# Patient Record
Sex: MALE | Race: BLACK OR AFRICAN AMERICAN | NOT HISPANIC OR LATINO | URBAN - METROPOLITAN AREA
[De-identification: names, ages, dates, MRNs, and addresses within clinical notes are randomized per-mention and may not be internally consistent; named-entity substitution may affect disease eponyms.]

---

## 2023-04-13 ENCOUNTER — INPATIENT (INPATIENT)
Facility: HOSPITAL | Age: 68
LOS: 5 days | Discharge: HOME HEALTH | DRG: 545 | End: 2023-04-19
Attending: INTERNAL MEDICINE | Admitting: INTERNAL MEDICINE
Payer: MEDICARE

## 2023-04-13 ENCOUNTER — APPOINTMENT (OUTPATIENT)
Dept: BARIATRICS | Facility: CLINIC | Age: 68
End: 2023-04-13
Payer: MEDICARE

## 2023-04-13 VITALS
HEART RATE: 103 BPM | WEIGHT: 154.1 LBS | DIASTOLIC BLOOD PRESSURE: 77 MMHG | SYSTOLIC BLOOD PRESSURE: 116 MMHG | RESPIRATION RATE: 20 BRPM | HEIGHT: 74 IN | OXYGEN SATURATION: 95 % | TEMPERATURE: 99 F

## 2023-04-13 VITALS
SYSTOLIC BLOOD PRESSURE: 131 MMHG | TEMPERATURE: 97.9 F | OXYGEN SATURATION: 89 % | HEIGHT: 74 IN | HEART RATE: 117 BPM | DIASTOLIC BLOOD PRESSURE: 79 MMHG | WEIGHT: 152.13 LBS | BODY MASS INDEX: 19.52 KG/M2

## 2023-04-13 LAB
ALBUMIN SERPL ELPH-MCNC: 2.5 G/DL — LOW (ref 3.3–5)
ALBUMIN SERPL ELPH-MCNC: 2.5 G/DL — LOW (ref 3.3–5)
ALP SERPL-CCNC: 56 U/L — SIGNIFICANT CHANGE UP (ref 40–120)
ALP SERPL-CCNC: SIGNIFICANT CHANGE UP U/L (ref 40–120)
ALT FLD-CCNC: 102 U/L — HIGH (ref 10–45)
ALT FLD-CCNC: SIGNIFICANT CHANGE UP U/L (ref 10–45)
ANION GAP SERPL CALC-SCNC: 10 MMOL/L — SIGNIFICANT CHANGE UP (ref 5–17)
ANISOCYTOSIS BLD QL: SIGNIFICANT CHANGE UP
APPEARANCE UR: CLEAR — SIGNIFICANT CHANGE UP
APTT BLD: 29.2 SEC — SIGNIFICANT CHANGE UP (ref 27.5–35.5)
AST SERPL-CCNC: 241 U/L — HIGH (ref 10–40)
AST SERPL-CCNC: SIGNIFICANT CHANGE UP U/L (ref 10–40)
BACTERIA # UR AUTO: PRESENT /HPF
BASOPHILS # BLD AUTO: 0.11 K/UL — SIGNIFICANT CHANGE UP (ref 0–0.2)
BASOPHILS NFR BLD AUTO: 0.9 % — SIGNIFICANT CHANGE UP (ref 0–2)
BILIRUB DIRECT SERPL-MCNC: 0.2 MG/DL — SIGNIFICANT CHANGE UP (ref 0–0.3)
BILIRUB INDIRECT FLD-MCNC: 0.2 MG/DL — SIGNIFICANT CHANGE UP (ref 0.2–1)
BILIRUB SERPL-MCNC: 0.4 MG/DL — SIGNIFICANT CHANGE UP (ref 0.2–1.2)
BILIRUB SERPL-MCNC: 0.4 MG/DL — SIGNIFICANT CHANGE UP (ref 0.2–1.2)
BILIRUB UR-MCNC: NEGATIVE — SIGNIFICANT CHANGE UP
BUN SERPL-MCNC: 22 MG/DL — SIGNIFICANT CHANGE UP (ref 7–23)
CALCIUM SERPL-MCNC: 9.4 MG/DL — SIGNIFICANT CHANGE UP (ref 8.4–10.5)
CHLORIDE SERPL-SCNC: 97 MMOL/L — SIGNIFICANT CHANGE UP (ref 96–108)
CO2 SERPL-SCNC: 26 MMOL/L — SIGNIFICANT CHANGE UP (ref 22–31)
COLOR SPEC: YELLOW — SIGNIFICANT CHANGE UP
CREAT SERPL-MCNC: 1.36 MG/DL — HIGH (ref 0.5–1.3)
DIFF PNL FLD: ABNORMAL
EGFR: 57 ML/MIN/1.73M2 — LOW
EOSINOPHIL # BLD AUTO: 0 K/UL — SIGNIFICANT CHANGE UP (ref 0–0.5)
EOSINOPHIL NFR BLD AUTO: 0 % — SIGNIFICANT CHANGE UP (ref 0–6)
EPI CELLS # UR: SIGNIFICANT CHANGE UP /HPF (ref 0–5)
GIANT PLATELETS BLD QL SMEAR: PRESENT — SIGNIFICANT CHANGE UP
GLUCOSE SERPL-MCNC: 95 MG/DL — SIGNIFICANT CHANGE UP (ref 70–99)
GLUCOSE UR QL: NEGATIVE — SIGNIFICANT CHANGE UP
HCT VFR BLD CALC: 31.3 % — LOW (ref 39–50)
HGB BLD-MCNC: 10.4 G/DL — LOW (ref 13–17)
HYPOCHROMIA BLD QL: SLIGHT — SIGNIFICANT CHANGE UP
INR BLD: 1.13 — SIGNIFICANT CHANGE UP (ref 0.88–1.16)
KETONES UR-MCNC: NEGATIVE — SIGNIFICANT CHANGE UP
LEUKOCYTE ESTERASE UR-ACNC: NEGATIVE — SIGNIFICANT CHANGE UP
LYMPHOCYTES # BLD AUTO: 0.23 K/UL — LOW (ref 1–3.3)
LYMPHOCYTES # BLD AUTO: 1.9 % — LOW (ref 13–44)
MACROCYTES BLD QL: SLIGHT — SIGNIFICANT CHANGE UP
MAGNESIUM SERPL-MCNC: 2.1 MG/DL — SIGNIFICANT CHANGE UP (ref 1.6–2.6)
MANUAL SMEAR VERIFICATION: SIGNIFICANT CHANGE UP
MCHC RBC-ENTMCNC: 23.2 PG — LOW (ref 27–34)
MCHC RBC-ENTMCNC: 33.2 GM/DL — SIGNIFICANT CHANGE UP (ref 32–36)
MCV RBC AUTO: 69.9 FL — LOW (ref 80–100)
METAMYELOCYTES # FLD: 0.9 % — HIGH (ref 0–0)
MICROCYTES BLD QL: SIGNIFICANT CHANGE UP
MONOCYTES # BLD AUTO: 0.66 K/UL — SIGNIFICANT CHANGE UP (ref 0–0.9)
MONOCYTES NFR BLD AUTO: 5.5 % — SIGNIFICANT CHANGE UP (ref 2–14)
NEUTROPHILS # BLD AUTO: 10.89 K/UL — HIGH (ref 1.8–7.4)
NEUTROPHILS NFR BLD AUTO: 90.8 % — HIGH (ref 43–77)
NITRITE UR-MCNC: NEGATIVE — SIGNIFICANT CHANGE UP
NRBC # BLD: 2 /100 — HIGH (ref 0–0)
NRBC # BLD: SIGNIFICANT CHANGE UP /100 WBCS (ref 0–0)
PH UR: 6 — SIGNIFICANT CHANGE UP (ref 5–8)
PLAT MORPH BLD: ABNORMAL
PLATELET # BLD AUTO: 453 K/UL — HIGH (ref 150–400)
POIKILOCYTOSIS BLD QL AUTO: SIGNIFICANT CHANGE UP
POLYCHROMASIA BLD QL SMEAR: SLIGHT — SIGNIFICANT CHANGE UP
POTASSIUM SERPL-MCNC: 4.4 MMOL/L — SIGNIFICANT CHANGE UP (ref 3.5–5.3)
POTASSIUM SERPL-MCNC: SIGNIFICANT CHANGE UP MMOL/L (ref 3.5–5.3)
POTASSIUM SERPL-SCNC: 4.4 MMOL/L — SIGNIFICANT CHANGE UP (ref 3.5–5.3)
POTASSIUM SERPL-SCNC: SIGNIFICANT CHANGE UP MMOL/L (ref 3.5–5.3)
PROT SERPL-MCNC: 8.1 G/DL — SIGNIFICANT CHANGE UP (ref 6–8.3)
PROT SERPL-MCNC: 8.2 G/DL — SIGNIFICANT CHANGE UP (ref 6–8.3)
PROT UR-MCNC: 100 MG/DL
PROTHROM AB SERPL-ACNC: 13.5 SEC — HIGH (ref 10.5–13.4)
RBC # BLD: 4.48 M/UL — SIGNIFICANT CHANGE UP (ref 4.2–5.8)
RBC # FLD: 20.1 % — HIGH (ref 10.3–14.5)
RBC BLD AUTO: ABNORMAL
RBC CASTS # UR COMP ASSIST: < 5 /HPF — SIGNIFICANT CHANGE UP
SARS-COV-2 RNA SPEC QL NAA+PROBE: NEGATIVE — SIGNIFICANT CHANGE UP
SCHISTOCYTES BLD QL AUTO: SLIGHT — SIGNIFICANT CHANGE UP
SMUDGE CELLS # BLD: PRESENT — SIGNIFICANT CHANGE UP
SODIUM SERPL-SCNC: 133 MMOL/L — LOW (ref 135–145)
SP GR SPEC: 1.02 — SIGNIFICANT CHANGE UP (ref 1–1.03)
SPHEROCYTES BLD QL SMEAR: SLIGHT — SIGNIFICANT CHANGE UP
TARGETS BLD QL SMEAR: SIGNIFICANT CHANGE UP
TSH SERPL-MCNC: 12.64 UIU/ML — HIGH (ref 0.27–4.2)
UROBILINOGEN FLD QL: 0.2 E.U./DL — SIGNIFICANT CHANGE UP
WBC # BLD: 11.99 K/UL — HIGH (ref 3.8–10.5)
WBC # FLD AUTO: 11.99 K/UL — HIGH (ref 3.8–10.5)
WBC UR QL: < 5 /HPF — SIGNIFICANT CHANGE UP

## 2023-04-13 PROCEDURE — 71260 CT THORAX DX C+: CPT | Mod: 26,MA

## 2023-04-13 PROCEDURE — 70450 CT HEAD/BRAIN W/O DYE: CPT | Mod: 26,MA

## 2023-04-13 PROCEDURE — 83020 HEMOGLOBIN ELECTROPHORESIS: CPT | Mod: 26

## 2023-04-13 PROCEDURE — 99285 EMERGENCY DEPT VISIT HI MDM: CPT

## 2023-04-13 PROCEDURE — 99205 OFFICE O/P NEW HI 60 MIN: CPT

## 2023-04-13 PROCEDURE — 70491 CT SOFT TISSUE NECK W/DYE: CPT | Mod: 26,MA

## 2023-04-13 PROCEDURE — 74177 CT ABD & PELVIS W/CONTRAST: CPT | Mod: 26,MA

## 2023-04-13 RX ORDER — THIAMINE MONONITRATE (VIT B1) 100 MG
100 TABLET ORAL ONCE
Refills: 0 | Status: COMPLETED | OUTPATIENT
Start: 2023-04-13 | End: 2023-04-13

## 2023-04-13 RX ADMIN — Medication 100 MILLIGRAM(S): at 20:36

## 2023-04-13 NOTE — ASSESSMENT
[FreeTextEntry1] : Patient is a 68 year old M with no significant medical or surgical history who presents here today for the initial evaluation of dysphagia. On Metoprolol and amoxicillin. Referred by Dr. Chase Daly. Labs reviewed, revealed a normal liver, hyponitremia, transaminitis, anemia and hypoalbuminemia. CT shows multiple abrasion plasty and enlarged sub aortic node.  Patient complains of cheonic fatigue, LE edema due to standing excessively following which he started experiencing dysphagia and pain with meals, specially with solids. Reports one morning his face was swollen for which he visited the ED and was treated for 3 days with improvement. He was drinking soup during this event and lost around 10 lbs. Notices cough with phelgm. Colonoscopy from 11/25/22 was normal. Reports does not smoke and drink ETOH. He has a C-collar in place and reports having his head "drooping" down when he does not wear the C-collar. At this time, believe the best treatment plan for the patient will be sending him to the ER as the patient is severely anemic and has hypoalbuminemia. Have called the ambulance for transport.

## 2023-04-13 NOTE — ED PROVIDER NOTE - OBJECTIVE STATEMENT
67yo M previously healthy here w/ c/o generalized weakness, body pain, and unable to swallow x 6 months with a gradual 50lb weight loss. Also noted at times his legs will swell, as will his face. Had imaging 1 month ago with concern for aspiration, s/p abx but symptoms worsening. Saw GI and surgery outpatient but no procedures yet, was sent to Dr. Quiñones today but at appt recommended to come to the ED for inpatient workup.   pt denies any fever, chills, n/v, diarrhea/ constipation, any falls due to weakness. States had spine imaging that he did not bring of his thoracic spine that was totally normal, weakness attributed to dehydation/weight loss not spinal.  · Presenting Symptoms: WEAKNESS  · Negative Findings: no chills, no dizziness, no fever, no nausea, no pain, no tingling, no vomiting  · Timing: worsening  · Duration: day(s)  · Severity: MODERATE

## 2023-04-13 NOTE — CONSULT NOTE ADULT - ASSESSMENT
69yo male with no sig PMH PSH history presents with worsening weight loss, dysphagia, weakness and fatigue. Afebrile, HDS, exam significant for cachexia, abdomen soft, right testicular mass. Labs with WBC 12k, elevated TSH. CT demonstrating fluid level in esophagus, right testicular mass and concern for brain lesion. Differential includes metastatic neoplasm, unlikely esophageal, potentially testicular primary. Patient currently poorly nutritionally optimized.      - no acute surgical intervention  -  Recommend multidisciplinary evaluation  -  Please obtain GI, Neurology, Urology consultations  -  Recommend evaluation for potential medicine admission  - Surgery Team 2C will continue to follow. Please page Team 2 with questions/clinical changes. [phone]   67yo male with no sig PMH PSH history presents with worsening weight loss, dysphagia, weakness and fatigue. Afebrile, HDS, exam significant for cachexia, abdomen soft, right testicular mass. Labs with WBC 12k, elevated TSH. CT demonstrating fluid level in esophagus, right testicular mass and concern for brain lesion. Differential includes metastatic neoplasm, unlikely esophageal, potentially testicular primary. Patient currently poorly nutritionally optimized.      - no acute surgical intervention  -  Recommend multidisciplinary evaluation  -  Please obtain GI, Neurology, Urology consultations  -  Recommend evaluation for potential medicine admission  - Surgery Team 2C will continue to follow. Please page Team 2 with questions/clinical changes. [phone]    CHIEF RESIDENT ADDENDUM  Agree with above. Delayed note while in the OR; consult resident discussed with attending surgeon directly. 68M with no PMHx a/w 50 lb weight loss, dysphagia and weakness to the office, sent into the ER for further medical workup. Exam concerning for testicular mass. WBC 12. CT with testicular mass and possible brain lesion. 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 Recommend GI, neurology, and urology. Surgery will continue to follow while on medical service.

## 2023-04-13 NOTE — ED ADULT NURSE NOTE - OBJECTIVE STATEMENT
Pt presented to ed with the c/o generalized weakness, body pain, and unable to swallow. As per pt he went to check with his doctor, and for swallow study, he is unable to swallow and the doctor felt unsafe for him to go home so he was send in to hospital for further evaluation. Pt states he is having generalized body ache, and he is feeling very weak, as per pt wife at the bed side pt lost more than 10 pounds in the last 6 months.   On assessment pt is awake, alert and oriented, able to speak in full sentence, gurgly sound noted when pt is talking, pt respiration is regular and non labored.  pt denies any fever, chills, n/v, diarrhea/ constipation, any falls due to weakness, not in any acute distress at this time, will monitor safety maintained.

## 2023-04-13 NOTE — ED ADULT NURSE NOTE - NSIMPLEMENTINTERV_GEN_ALL_ED
Implemented All Fall Risk Interventions:  [place] to call system. Call bell, personal items and telephone within reach. Instruct patient to call for assistance. Room bathroom lighting operational. Non-slip footwear when patient is off stretcher. Physically safe environment: no spills, clutter or unnecessary equipment. Stretcher in lowest position, wheels locked, appropriate side rails in place. Provide visual cue, wrist band, yellow gown, etc. Monitor gait and stability. Monitor for mental status changes and reorient to person, place, and time. Review medications for side effects contributing to fall risk. Reinforce activity limits and safety measures with patient and family.

## 2023-04-13 NOTE — ED PROVIDER NOTE - PHYSICAL EXAMINATION
CONSTITUTIONAL: thin tired appearing older male  HEAD: Normocephalic; atraumatic.   EYES:  conjunctiva and sclera clear  ENT: normal nose; no rhinorrhea; normal pharynx with no erythema or lesions.   NECK: Supple; non-tender; no palpable thyroid  CARDIOVASCULAR: Normal S1, S2; no murmurs, rubs, or gallops. Regular rate and rhythm.   RESPIRATORY: Breathing easily; breath sounds clear and equal bilaterally; no wheezes, rhonchi, or rales.  GI: Soft; non-distended; non-tender; no palpable organomegaly.   EXT: No cyanosis or edema; N/V intact  SKIN: Normal for age and race; warm; dry; good turgor; no apparent lesions or rash.   NEURO: A & O x 3; face symmetric; grossly unremarkable.   PSYCHOLOGICAL: The patient’s mood and manner are appropriate.

## 2023-04-13 NOTE — HISTORY OF PRESENT ILLNESS
[de-identified] : Patient is a 68 year old M with no significant medical or surgical history who presents here today for the initial evaluation of dysphagia. On Metoprolol and amoxicillin. Referred by Dr. Chase Daly. Labs reviewed, revealed a normal liver, hyponitremia, transaminitis, anemia and hypoalbuminemia. CT shows multiple abrasion plasty and enlarged sub aortic node.  Patient complains of cheonic fatigue, LE edema due to standing excessively following which he started experiencing dysphagia and pain with meals, specially with solids. Reports one morning his face was swollen for which he visited the ED and was treated for 3 days with improvement. He was drinking soup during this event and lost around 10 lbs. Notices cough with phelgm. Colonoscopy from 11/25/22 was normal. Reports does not smoke and drink ETOH. He has a C-collar in place and reports having his head "drooping" down when he does not wear the C-collar. At this time, believe the best treatment plan for the patient will be sending him to the ER as the patient is severely anemic and has hypoalbuminemia. Will have labs drawn and will co-ordinate with the ED if needed for any further consultation. Have called the ambulance for transport.

## 2023-04-13 NOTE — END OF VISIT
[FreeTextEntry3] : All medical record entries made by the Scribe were at my, SHAINA Flores , direction and personally dictated by me on 04/13/2023 . I have reviewed the chart and agree that the record accurately reflects my personal performance of the history, physical exam, assessment and plan. I have also personally directed, reviewed, and agreed with the chart.\par  [Time Spent: ___ minutes] : I have spent [unfilled] minutes of time on the encounter.

## 2023-04-13 NOTE — ED PROVIDER NOTE - WET READ LAUNCH FT
There are no Wet Read(s) to document.
You can access the FollowMyHealth Patient Portal offered by [facility] by registering at the following website: [email or web address]. By joining [email or web address] FollowMyHealth portal, you will also be able to view your health information using other applications (apps) compatible with our system.

## 2023-04-13 NOTE — PHYSICAL EXAM
[No Rash or Lesion] : No rash or lesion [Alert] : alert [Oriented to Person] : oriented to person [Oriented to Place] : oriented to place [Oriented to Time] : oriented to time [Calm] : calm [de-identified] : not in any acute distress [de-identified] : ful range of motion

## 2023-04-13 NOTE — CONSULT NOTE ADULT - SUBJECTIVE AND OBJECTIVE BOX
Surgery Consult    Consulting Surgical Team:   [ ] Team 1 - Colorectal/Surgical Oncology ([phone])    [ x ] Team 2 - MIS/Bariatric Surgery ([phone])     [ ] Team 4 - Acute Care Surgery ([phone])     [ ] Team 5 - General Surgery/Breast/Pediatric Surgery ([phone])    Consulting Attending: Dr. Quiñones    HPI: 67yo M previously healthy here w/ c/o generalized weakness, body pain, and unable to swallow x 6 months with a gradual 50lb weight loss. Also noted at times his legs will swell, as will his face. Had imaging 1 month ago with concern for aspiration, s/p abx but symptoms worsening. Saw GI and surgery outpatient but no procedures yet, was sent to Dr. Quiñones today but at appt recommended to come to the ED for inpatient workup.   	pt denies any fever, chills, n/v, diarrhea/ constipation, any falls due to weakness. States had spine imaging that he did not bring of his thoracic spine that was totally normal, weakness attributed to dehydation/weight loss not spinal.  	· Presenting Symptoms: WEAKNESS  	· Negative Findings: no chills, no dizziness, no fever, no nausea, no pain, no tingling, no vomiting  	· Timing: worsening  	· Duration: day(s)  · Severity: MODERATE    Surgery Addendum  Patient is a 68 year old M with no significant medical or surgical history who presents sent in by Dr. Quiñones from the office for further evaluation. Patient with complaint of dysphagia, weight loss, and fatigue for several months.  Patient complains of cheonic fatigue, LE edema due to standing excessively following which he started experiencing dysphagia and pain with meals, specially with solids.  Passing flatus with bowel movements. Endorses 50 lbs weight loss over past months. Incidentally has noticed change in size of testicles. He complains of worsening b/l lower extremity weakness      PAST MEDICAL HISTORY:  No pertinent past medical history        PAST SURGICAL HISTORY:  No significant past surgical history        MEDICATIONS:      ALLERGIES:  No Known Allergies      SOCHX:   Social History:      FAMHX:   FAMILY HISTORY:        Vitals:  Vital Signs Last 24 Hrs  T(C): 36.9 (2023 18:44), Max: 37 (2023 16:28)  T(F): 98.5 (2023 18:44), Max: 98.6 (2023 16:28)  HR: 97 (2023 18:44) (97 - 103)  BP: 131/76 (2023 18:44) (116/77 - 131/76)  BP(mean): --  RR: 20 (2023 18:44) (20 - 20)  SpO2: 97% (2023 18:44) (95% - 97%)    Parameters below as of 2023 18:44  Patient On (Oxygen Delivery Method): room air          PHYSICAL EXAM:  Physical Exam  General: NAD, resting comfortably in bed, appears cachectic   Pulm: Nonlabored breathing, no respiratory distress  Abd: soft, non distended  Extrem: WWP, no edema,      I&o's:  I&O's Summary      LABS:                        10.4   11.99 )-----------( 453      ( 2023 17:53 )             31.3     0413    x   |  x   |  x   ----------------------------<  x   4.4   |  x   |  x     Ca    9.4      2023 17:53  Mg     2.1     04-13    TPro  8.1  /  Alb  2.5<L>  /  TBili  0.4  /  DBili  0.2  /  AST  241<H>  /  ALT  102<H>  /  AlkPhos  56  04-13    Lactate:    PT/INR - ( 2023 17:53 )   PT: 13.5 sec;   INR: 1.13          PTT - ( 2023 17:53 )  PTT:29.2 sec          Urinalysis Basic - ( 2023 17:53 )    Color: Yellow / Appearance: Clear / S.020 / pH: x  Gluc: x / Ketone: NEGATIVE  / Bili: Negative / Urobili: 0.2 E.U./dL   Blood: x / Protein: 100 mg/dL / Nitrite: NEGATIVE   Leuk Esterase: NEGATIVE / RBC: < 5 /HPF / WBC < 5 /HPF   Sq Epi: x / Non Sq Epi: x / Bacteria: Present /HPF        MICRO:     IMAGING:  < from: CT Chest w/ IV Cont (23 @ 20:14) >    ******PRELIMINARY REPORT******      ******PRELIMINARY REPORT******         ACC: [id number] EXAM:  CT ABDOMEN AND PELVIS IC   ORDERED BY: NELIA GRAY     ACC: [id number] EXAM:  CT CHEST IC   ORDERED BY: NELIA GRAY     PROCEDURE DATE:  2023    ******PRELIMINARY REPORT******      ******PRELIMINARY REPORT******           INTERPRETATION:  CLINICAL INFORMATION: 50 pound weight loss. Possible   aspiration. Chest/neck pain.    COMPARISON: None.    CONTRAST/COMPLICATIONS:  IV Contrast: IVcontrast documented in unlinked concurrent exam  60 cc   Isovue-370 discarded.  Oral Contrast: NONE  Complications: None reported at time of study completion    PROCEDURE:  CT of the Chest, Abdomen and Pelvis was performed.  Sagittal and coronal reformats were performed.    FINDINGS:  CHEST:  LUNGS AND LARGE AIRWAYS: Bilateral multifocal patchy consolidative and   groundglass opacities, most notably in the bilateral lower lobes. Patent   central airways.  PLEURA: No pleural effusion.  VESSELS: Mild atherosclerotic changes..  HEART: Cardiomegaly. No pericardial effusion.  MEDIASTINUM AND KEON: No lymphadenopathy.  CHEST WALL AND LOWER NECK: Within normal limits.    ABDOMEN AND PELVIS:  LIVER: Within normal limits.  BILE DUCTS: Normal caliber.  GALLBLADDER: Within normal limits.  SPLEEN: Within normal limits.  PANCREAS: Within normal limits.  ADRENALS: Within normal limits.  KIDNEYS/URETERS: No renal stones or hydronephrosis.    BLADDER: There is circumferential wall thickening of the urinary bladder.  REPRODUCTIVE ORGANS: Prostate within normal limits. Partially visualized   1.7 x 1.4 cm hyperdense lesion in the right hemiscrotum (93 HU).    BOWEL: Mild distention of the proximal and mid esophagus with layering   fluid. Limited evaluation secondary to lack of enteric contrast. No bowel   obstruction.  PERITONEUM: No ascites.  VESSELS: Mild atherosclerotic changes.  RETROPERITONEUM/LYMPH NODES: No lymphadenopathy.  ABDOMINAL WALL: Diffuse subcutaneous edema.  BONES: Degenerative changes.    IMPRESSION:  1.  Mild distention of the esophagus with layering fluid, with multifocal   patchy consolidative and groundglass opacities bilaterally, most likely   representing aspiration pneumonia.  2.  Partially visualized indeterminate 1.7 cm hyperdense lesion in the   right hemiscrotum, suspicious for malignancy. Recommend further   evaluation with scrotal ultrasound.  3.  Circumferential wall thickening of the urinary bladder, possibly   reflecting under distention, although correlation with urinalysis is   recommended to exclude underlying acute infectious/inflammatory cystitis.  4.  Diffuse subcutaneous edema.          ******PRELIMINARY REPORT******      ******PRELIMINARY REPORT******       JOI MCDONOUGH MD; Resident Radiologist  This document is a PRELIMINARY interpretation and is pending final   attending approval. 2023  9:16PM    < end of copied text >    < from: CT Abdomen and Pelvis w/ IV Cont (23 @ 20:14) >    ******PRELIMINARY REPORT******      ******PRELIMINARY REPORT******         ACC: [id number] EXAM:  CT ABDOMEN AND PELVIS IC   ORDERED BY: NELIA GRAY     ACC: [id number] EXAM:  CT CHEST IC   ORDERED BY: NELIA GRAY     PROCEDURE DATE:  2023    ******PRELIMINARY REPORT******      ******PRELIMINARY REPORT******           INTERPRETATION:  CLINICAL INFORMATION: 50 pound weight loss. Possible   aspiration. Chest/neck pain.    COMPARISON: None.    CONTRAST/COMPLICATIONS:  IV Contrast: IVcontrast documented in unlinked concurrent exam  60 cc   Isovue-370 discarded.  Oral Contrast: NONE  Complications: None reported at time of study completion    PROCEDURE:  CT of the Chest, Abdomen and Pelvis was performed.  Sagittal and coronal reformats were performed.    FINDINGS:  CHEST:  LUNGS AND LARGE AIRWAYS: Bilateral multifocal patchy consolidative and   groundglass opacities, most notably in the bilateral lower lobes. Patent   central airways.  PLEURA: No pleural effusion.  VESSELS: Mild atherosclerotic changes..  HEART: Cardiomegaly. No pericardial effusion.  MEDIASTINUM AND KEON: No lymphadenopathy.  CHEST WALL AND LOWER NECK: Within normal limits.    ABDOMEN AND PELVIS:  LIVER: Within normal limits.  BILE DUCTS: Normal caliber.  GALLBLADDER: Within normal limits.  SPLEEN: Within normal limits.  PANCREAS: Within normal limits.  ADRENALS: Within normal limits.  KIDNEYS/URETERS: No renal stones or hydronephrosis.    BLADDER: There is circumferential wall thickening of the urinary bladder.  REPRODUCTIVE ORGANS: Prostate within normal limits. Partially visualized   1.7 x 1.4 cm hyperdense lesion in the right hemiscrotum (93 HU).    BOWEL: Mild distention of the proximal and mid esophagus with layering   fluid. Limited evaluation secondary to lack of enteric contrast. No bowel   obstruction.  PERITONEUM: No ascites.  VESSELS: Mild atherosclerotic changes.  RETROPERITONEUM/LYMPH NODES: No lymphadenopathy.  ABDOMINAL WALL: Diffuse subcutaneous edema.  BONES: Degenerative changes.    IMPRESSION:  1.  Mild distention of the esophagus with layering fluid, with multifocal   patchy consolidative and groundglass opacities bilaterally, most likely   representing aspiration pneumonia.  2.  Partially visualized indeterminate 1.7 cm hyperdense lesion in the   right hemiscrotum, suspicious for malignancy. Recommend further   evaluation with scrotal ultrasound.  3.  Circumferential wall thickening of the urinary bladder, possibly   reflecting under distention, although correlation with urinalysis is   recommended to exclude underlying acute infectious/inflammatory cystitis.  4.  Diffuse subcutaneous edema.          ******PRELIMINARY REPORT******      ******PRELIMINARY REPORT******       JOI MCDONOUGH MD; Resident Radiologist  This document is a PRELIMINARY interpretation and is pending final   attending approval. 2023  9:16PM    < end of copied text >    < from: CT Neck Soft Tissue w/ IV Cont (23 @ 20:13) >    ******PRELIMINARY REPORT******      ******PRELIMINARY REPORT******         ACC: [id number] EXAM:  CT NECK SOFT TISSUE IC   ORDERED BY: NELIA GRAY     PROCEDURE DATE:  2023    ******PRELIMINARY REPORT******      ******PRELIMINARY REPORT******           INTERPRETATION:  Technique: A thin section axial acquisition was   performed from the skull base to the thoracic inlet.  The data was   reformatted in the sagittal, coronal and axial planes.    Contrast: 94 cc Isovue-370. 60 cc Isovue-370 discarded.    Clinical Information: Neck pain. Weight loss.    Prior Studies: None.    Findings:    *  Aerodigestive Tract: Layering fluid is noted within the proximal and   mid esophagus. No inflammatory changes involving the aerodigestive tract.  No airway narrowing.    *  Lymph Nodes: No cervical lymphadenopathy.    *  Salivary Glands: Unremarkable.    *  Thyroid Gland: Normal.    *  Orbits: The globes are symmetric. No radiopaque orbital foreign bodies   are visualized. The extraocular muscles, optic sheaths, and retrobulbar   fat are normal    *  Brain: Limited evaluation of the brain demonstrates a 0.9 x 1.1 cm   rim-enhancing hypodense lesion in the posterior left parietal lobe. This   lesion is only demonstrated on axial images (6:9). No hydrocephalus or   midline shift.    *  Skull Base: Intact.    *  Paranasal Sinuses: Polypoid mucosal thickening within the left   maxillary sinus.    *  Mastoids: Clear.    *  Cervical Spine: Multilevel degenerative changes. Trace anterolisthesis   of C7 on T1.    *  Lung Apices: Multifocal consolidative opacities in the inferior aspect   of the bilateral upper lobes, suggestive of pneumonia/aspiration. See CT   chest report from the same day for further evaluation.      IMPRESSION:  1.  Limited intracranial images demonstrate questionable 1.1 cm   rim-enhancing hypodense lesion in the posterior left parietal lobe seen   only on axial images. Differential includes malignancy/metastasis versus   less likely infection in this assumed immunocompetent patient. Recommend   further evaluation with contrast-enhanced MRI of the brain.  2.  Layering fluid within the esophagus with patchy consolidations in the   included bilateral upper lobes, most likely representing aspiration.        ******PRELIMINARY REPORT******      ******PRELIMINARY REPORT******       JOI MCDONOUGH MD; Resident Radiologist  This document is a PRELIMINARY interpretation and is pending final   attending approval. 2023  8:49PM    < end of copied text >

## 2023-04-13 NOTE — ED PROVIDER NOTE - CLINICAL SUMMARY MEDICAL DECISION MAKING FREE TEXT BOX
will reach out to pts pcp for more collateral information  pt very weak in the ED - will repeat labs today to assess for electrolyte abnl, areli  will repeat imaging as a few weeks since last, pt on antibiotics (augmentin) without improvement will reach out to pts pcp for more collateral information  pt very weak in the ED - will repeat labs today to assess for electrolyte abnl, areli  unable to walk, had spinal imaging and normal as per patient, ?electrolyte/vitamin related, will give IV thiamine and send Vit b12  will repeat imaging as a few weeks since last, pt on antibiotics (augmentin) without improvement

## 2023-04-13 NOTE — ADDENDUM
[FreeTextEntry1] : Documented by Zane Henley acting as a scribe for SHAINA Flores on 04/13/2023\par

## 2023-04-14 DIAGNOSIS — R60.0 LOCALIZED EDEMA: ICD-10-CM

## 2023-04-14 DIAGNOSIS — D72.829 ELEVATED WHITE BLOOD CELL COUNT, UNSPECIFIED: ICD-10-CM

## 2023-04-14 DIAGNOSIS — J69.0 PNEUMONITIS DUE TO INHALATION OF FOOD AND VOMIT: ICD-10-CM

## 2023-04-14 DIAGNOSIS — R74.01 ELEVATION OF LEVELS OF LIVER TRANSAMINASE LEVELS: ICD-10-CM

## 2023-04-14 DIAGNOSIS — N50.89 OTHER SPECIFIED DISORDERS OF THE MALE GENITAL ORGANS: ICD-10-CM

## 2023-04-14 DIAGNOSIS — R13.10 DYSPHAGIA, UNSPECIFIED: ICD-10-CM

## 2023-04-14 DIAGNOSIS — R53.1 WEAKNESS: ICD-10-CM

## 2023-04-14 DIAGNOSIS — Z29.9 ENCOUNTER FOR PROPHYLACTIC MEASURES, UNSPECIFIED: ICD-10-CM

## 2023-04-14 DIAGNOSIS — N17.9 ACUTE KIDNEY FAILURE, UNSPECIFIED: ICD-10-CM

## 2023-04-14 DIAGNOSIS — G93.9 DISORDER OF BRAIN, UNSPECIFIED: ICD-10-CM

## 2023-04-14 LAB
ALBUMIN SERPL ELPH-MCNC: 2.3 G/DL — LOW (ref 3.3–5)
ALP SERPL-CCNC: 52 U/L — SIGNIFICANT CHANGE UP (ref 40–120)
ALT FLD-CCNC: 98 U/L — HIGH (ref 10–45)
ANION GAP SERPL CALC-SCNC: 11 MMOL/L — SIGNIFICANT CHANGE UP (ref 5–17)
AST SERPL-CCNC: 230 U/L — HIGH (ref 10–40)
BASOPHILS # BLD AUTO: 0.08 K/UL — SIGNIFICANT CHANGE UP (ref 0–0.2)
BASOPHILS NFR BLD AUTO: 0.7 % — SIGNIFICANT CHANGE UP (ref 0–2)
BILIRUB SERPL-MCNC: 0.4 MG/DL — SIGNIFICANT CHANGE UP (ref 0.2–1.2)
BUN SERPL-MCNC: 22 MG/DL — SIGNIFICANT CHANGE UP (ref 7–23)
CALCIUM SERPL-MCNC: 9.5 MG/DL — SIGNIFICANT CHANGE UP (ref 8.4–10.5)
CHLORIDE SERPL-SCNC: 98 MMOL/L — SIGNIFICANT CHANGE UP (ref 96–108)
CO2 SERPL-SCNC: 24 MMOL/L — SIGNIFICANT CHANGE UP (ref 22–31)
CREAT SERPL-MCNC: 1.42 MG/DL — HIGH (ref 0.5–1.3)
EGFR: 54 ML/MIN/1.73M2 — LOW
EOSINOPHIL # BLD AUTO: 0.09 K/UL — SIGNIFICANT CHANGE UP (ref 0–0.5)
EOSINOPHIL NFR BLD AUTO: 0.8 % — SIGNIFICANT CHANGE UP (ref 0–6)
GLUCOSE BLDC GLUCOMTR-MCNC: 101 MG/DL — HIGH (ref 70–99)
GLUCOSE BLDC GLUCOMTR-MCNC: 48 MG/DL — CRITICAL LOW (ref 70–99)
GLUCOSE BLDC GLUCOMTR-MCNC: 68 MG/DL — LOW (ref 70–99)
GLUCOSE BLDC GLUCOMTR-MCNC: 83 MG/DL — SIGNIFICANT CHANGE UP (ref 70–99)
GLUCOSE BLDC GLUCOMTR-MCNC: 84 MG/DL — SIGNIFICANT CHANGE UP (ref 70–99)
GLUCOSE BLDC GLUCOMTR-MCNC: 96 MG/DL — SIGNIFICANT CHANGE UP (ref 70–99)
GLUCOSE SERPL-MCNC: 81 MG/DL — SIGNIFICANT CHANGE UP (ref 70–99)
HAV IGM SER-ACNC: SIGNIFICANT CHANGE UP
HBV CORE IGM SER-ACNC: SIGNIFICANT CHANGE UP
HBV SURFACE AG SER-ACNC: SIGNIFICANT CHANGE UP
HCT VFR BLD CALC: 28.9 % — LOW (ref 39–50)
HGB BLD-MCNC: 9.7 G/DL — LOW (ref 13–17)
IMM GRANULOCYTES NFR BLD AUTO: 1.1 % — HIGH (ref 0–0.9)
LYMPHOCYTES # BLD AUTO: 0.73 K/UL — LOW (ref 1–3.3)
LYMPHOCYTES # BLD AUTO: 6.2 % — LOW (ref 13–44)
MAGNESIUM SERPL-MCNC: 2.1 MG/DL — SIGNIFICANT CHANGE UP (ref 1.6–2.6)
MCHC RBC-ENTMCNC: 23.3 PG — LOW (ref 27–34)
MCHC RBC-ENTMCNC: 33.6 GM/DL — SIGNIFICANT CHANGE UP (ref 32–36)
MCV RBC AUTO: 69.5 FL — LOW (ref 80–100)
MONOCYTES # BLD AUTO: 0.66 K/UL — SIGNIFICANT CHANGE UP (ref 0–0.9)
MONOCYTES NFR BLD AUTO: 5.6 % — SIGNIFICANT CHANGE UP (ref 2–14)
NEUTROPHILS # BLD AUTO: 10.07 K/UL — HIGH (ref 1.8–7.4)
NEUTROPHILS NFR BLD AUTO: 85.6 % — HIGH (ref 43–77)
NRBC # BLD: 0 /100 WBCS — SIGNIFICANT CHANGE UP (ref 0–0)
NT-PROBNP SERPL-SCNC: 806 PG/ML — HIGH (ref 0–300)
PHOSPHATE SERPL-MCNC: 3.9 MG/DL — SIGNIFICANT CHANGE UP (ref 2.5–4.5)
PLATELET # BLD AUTO: 462 K/UL — HIGH (ref 150–400)
POTASSIUM SERPL-MCNC: 4.2 MMOL/L — SIGNIFICANT CHANGE UP (ref 3.5–5.3)
POTASSIUM SERPL-SCNC: 4.2 MMOL/L — SIGNIFICANT CHANGE UP (ref 3.5–5.3)
PROT SERPL-MCNC: 8 G/DL — SIGNIFICANT CHANGE UP (ref 6–8.3)
RBC # BLD: 4.16 M/UL — LOW (ref 4.2–5.8)
RBC # FLD: 19.8 % — HIGH (ref 10.3–14.5)
SODIUM SERPL-SCNC: 133 MMOL/L — LOW (ref 135–145)
T4 AB SER-ACNC: 5.13 UG/DL — SIGNIFICANT CHANGE UP (ref 4.5–11.7)
WBC # BLD: 11.76 K/UL — HIGH (ref 3.8–10.5)
WBC # FLD AUTO: 11.76 K/UL — HIGH (ref 3.8–10.5)

## 2023-04-14 PROCEDURE — 74230 X-RAY XM SWLNG FUNCJ C+: CPT | Mod: 26

## 2023-04-14 PROCEDURE — 93970 EXTREMITY STUDY: CPT | Mod: 26

## 2023-04-14 PROCEDURE — 76705 ECHO EXAM OF ABDOMEN: CPT | Mod: 26,59

## 2023-04-14 PROCEDURE — 76870 US EXAM SCROTUM: CPT | Mod: 26

## 2023-04-14 PROCEDURE — 93975 VASCULAR STUDY: CPT | Mod: 26

## 2023-04-14 PROCEDURE — 99222 1ST HOSP IP/OBS MODERATE 55: CPT

## 2023-04-14 PROCEDURE — 99233 SBSQ HOSP IP/OBS HIGH 50: CPT | Mod: GC

## 2023-04-14 RX ORDER — NYSTATIN 500MM UNIT
500000 POWDER (EA) MISCELLANEOUS EVERY 24 HOURS
Refills: 0 | Status: DISCONTINUED | OUTPATIENT
Start: 2023-04-14 | End: 2023-04-18

## 2023-04-14 RX ORDER — SODIUM CHLORIDE 9 MG/ML
500 INJECTION INTRAMUSCULAR; INTRAVENOUS; SUBCUTANEOUS
Refills: 0 | Status: DISCONTINUED | OUTPATIENT
Start: 2023-04-14 | End: 2023-04-17

## 2023-04-14 RX ORDER — CEFTRIAXONE 500 MG/1
2000 INJECTION, POWDER, FOR SOLUTION INTRAMUSCULAR; INTRAVENOUS EVERY 24 HOURS
Refills: 0 | Status: DISCONTINUED | OUTPATIENT
Start: 2023-04-14 | End: 2023-04-19

## 2023-04-14 RX ORDER — ACETAMINOPHEN 500 MG
650 TABLET ORAL ONCE
Refills: 0 | Status: COMPLETED | OUTPATIENT
Start: 2023-04-14 | End: 2023-04-14

## 2023-04-14 RX ORDER — ENOXAPARIN SODIUM 100 MG/ML
40 INJECTION SUBCUTANEOUS EVERY 24 HOURS
Refills: 0 | Status: DISCONTINUED | OUTPATIENT
Start: 2023-04-14 | End: 2023-04-19

## 2023-04-14 RX ADMIN — Medication 500000 UNIT(S): at 06:43

## 2023-04-14 RX ADMIN — Medication 650 MILLIGRAM(S): at 21:09

## 2023-04-14 RX ADMIN — SODIUM CHLORIDE 100 MILLILITER(S): 9 INJECTION INTRAMUSCULAR; INTRAVENOUS; SUBCUTANEOUS at 04:40

## 2023-04-14 RX ADMIN — ENOXAPARIN SODIUM 40 MILLIGRAM(S): 100 INJECTION SUBCUTANEOUS at 19:11

## 2023-04-14 RX ADMIN — CEFTRIAXONE 100 MILLIGRAM(S): 500 INJECTION, POWDER, FOR SOLUTION INTRAMUSCULAR; INTRAVENOUS at 04:41

## 2023-04-14 RX ADMIN — Medication 650 MILLIGRAM(S): at 19:49

## 2023-04-14 NOTE — SWALLOW VFSS/MBS ASSESSMENT ADULT - SLP GENERAL OBSERVATIONS
Pt received upright in lateral view, alert and oriented x4, c/o neck and back discomfort but willing to move forward with study, room air, amenable to evaluation.

## 2023-04-14 NOTE — PHYSICAL THERAPY INITIAL EVALUATION ADULT - ADDITIONAL COMMENTS
~3 months ago, pt was IND, then pt began to need assist from wife. Pt state he was still able to perform tasks such as amb to bathroom. Pt has not been ambulating with device ~3 months ago, pt was IND, then pt began to need assist from wife. Pt state he was still able to perform tasks such as amb to bathroom. Pt has not been ambulating with device. Pt lives in an elevator building, no GERON[place]

## 2023-04-14 NOTE — PHYSICAL THERAPY INITIAL EVALUATION ADULT - PERTINENT HX OF CURRENT PROBLEM, REHAB EVAL
69yo M here w/ c/o generalized weakness, body pain, and unable to swallow x 6 months with a gradual 50lb weight loss. All of his issues started 1 year ago per pt he started to have lower extremity swelling, was started on lasix and subsequently lost to f/u so stoppe dhis lasix and then started to have facial swelling 4 months ago. He said he had a sonogram of his heart and was told something was abnormal but does not remember what.  After that he had difficulty swallowing solid foods which has progressed to sometimes thin foods as well. He saw his gastroenterologist Dr Meier who told him he was having aspiration but he never had imaging or work up. He took antibiotics for 'aspiration' but said that they did not help because he never had much of a cough or fever.  He was [facility] to [facility]. He has been having falls about 3 for the past few months. States had spine imaging that he did not bring of his thoracic spine that was totally normal, weakness attributed to dehydation/weight loss not spinal. Pt denies any fever, chills, n/v, diarrhea/ constipation.

## 2023-04-14 NOTE — H&P ADULT - ASSESSMENT
69yo M here w/ c/o generalized weakness, body pain, and unable to swallow x 6 months with a gradual 50lb weight loss admitted for FTT and dysphagia work up.  67yo M here w/ c/o generalized weakness, body pain, and unable to swallow x 6 months with a gradual 50lb weight loss admitted for FTT weakness and dysphagia work up.

## 2023-04-14 NOTE — SWALLOW VFSS/MBS ASSESSMENT ADULT - COMMENTS
Pt report of progressive dysphagia with solids and liquids, although solids more difficult, c/b sensation of residue, need for multiple swallows

## 2023-04-14 NOTE — PROGRESS NOTE ADULT - PROBLEM SELECTOR PLAN 2
Patient with months of dysphagia - started with solid foods now progressively getting worse. Has seen GI who stated he was aspirating but has had no further work up since. Also received laryngoscopy with ENT outpatient in January, which showed green fluid and pt given 14days of augmentin, pt did not complete abx course.  - CT soft tissue showing Layering fluid within the esophagus with patchy consolidations in the included bilateral upper lobes, most likely representing aspiration.  Plan:  -consult GI  -f/u scleroderma rheum labs  -barium swallow  -s+s eval  -npo for now

## 2023-04-14 NOTE — PROGRESS NOTE ADULT - PROBLEM SELECTOR PLAN 5
Patient with transaminitis on admission - no known transaminitis outpatient on his labs. No RUQ tenderness at this time, no liver issues seen on CTAP.  - trend CMP  - f/u RUQ US  - f/u hepatitis panel

## 2023-04-14 NOTE — CONSULT NOTE ADULT - SUBJECTIVE AND OBJECTIVE BOX
HPI:  67yo M here w/ c/o generalized weakness, body pain, and unable to swallow x 6 months with a gradual 50lb weight loss. All of his issues started 1 year ago per pt he started to have lower extremity swelling, was started on lasix and subsequently lost to f/u so stoppe dhis lasix and then started to have facial swelling 4 months ago. He said he had a sonogram of his heart and was told something was abnormal but does not remember what.  After that he had difficulty swallowing solid foods which has progressed to sometimes thin foods as well. He saw his gastroenterologist Dr Meier who told him he was having aspiration but he never had imaging or work up. He took antibiotics for 'aspiration' but said that they did not help because he never had much of a cough or fever.  He was sent to Dr. [facility]ones [facility]. He has been having falls about 3 for the past few months. States had spine imaging that he did not bring of his thoracic spine that was totally normal, weakness attributed to dehydation/weight loss not spinal. Pt denies any fever, chills, n/v, diarrhea/ constipation.      In the ED:  Vitals 98.6  /77 RA 95%   Labs: WBC 11.99 Hg 10.4 Plt 453 PT 13.5 INR 1.13 Na 133 K 4.4 Cl 97 Bicarb 26 AG 22 Cr 1.36   TSH 12.64   Imaging: CTAP  1.  Mild distention of the esophagus with layering fluid, with multifocal   patchy consolidative and groundglass opacities bilaterally, most likely   representing aspiration pneumonia.  2.  Partially visualized indeterminate 1.7 cm hyperdense lesion in the   right hemiscrotum, suspicious for malignancy. Recommend further   evaluation with scrotal ultrasound.  3.  Circumferential wall thickening of the urinary bladder, possibly   reflecting under distention, although correlation with urinalysis is   recommended to exclude underlying acute infectious/inflammatory cystitis.  4.  Diffuse subcutaneous edema.  Ct soft tissue   1.  Limited intracranial images demonstrate questionable 1.1 cm   rim-enhancing hypodense lesion in the posterior left parietal lobe seen   only on axial images. Differential includes malignancy/metastasis versus   less likely infection in this assumed immunocompetent patient. Recommend   further evaluation with contrast-enhanced MRI of the brain.  2.  Layering fluid within the esophagus with patchy consolidations in the   included bilateral upper lobes, most likely representing aspiration.   (14 Apr 2023 02:32)    Urology Consult Note  67yo M no known PMH (possible cardiac) here w/ c/o generalized weakness, body pain, and unable to swallow x 6 months with a gradual 50lb weight loss admitted for FTT weakness and dysphagia work up. He had a CT abdomen/pelvis with IV contrast on 4/13/23, partially imaged scrotum/testicles, hyperenhancing right hemiscrotal nodule may represent normal testicle or testicular mass, cannot be accurately assessed since contralateral left hemiscrotum is not included in field-of-view. He had CT chest with IV contrast imaging as well. He had US scrotal obtained on 4/14/23 that showed a 1.7 cm hypervascular solid lesion in the right testicle suspicious for neoplasm.  He has no family history of urologic cancers.    Vital Signs Last 24 Hrs  T(C): 36.4 (14 Apr 2023 16:05), Max: 36.9 (13 Apr 2023 18:44)  T(F): 97.5 (14 Apr 2023 16:05), Max: 98.5 (13 Apr 2023 18:44)  HR: 93 (14 Apr 2023 16:05) (91 - 97)  BP: 120/78 (14 Apr 2023 16:05) (115/73 - 148/92)  BP(mean): 92 (14 Apr 2023 01:48) (92 - 92)  RR: 18 (14 Apr 2023 16:05) (18 - 20)  SpO2: 95% (14 Apr 2023 16:05) (91% - 98%)    Parameters below as of 14 Apr 2023 16:05  Patient On (Oxygen Delivery Method): nasal cannula  O2 Flow (L/min): 2    I&O's Summary    13 Apr 2023 07:01  -  14 Apr 2023 07:00  --------------------------------------------------------  IN: 450 mL / OUT: 0 mL / NET: 450 mL        PE:  Gen:  Abd:  :    LABS:                        9.7    11.76 )-----------( 462      ( 14 Apr 2023 06:10 )             28.9     04-14    133<L>  |  98  |  22  ----------------------------<  81  4.2   |  24  |  1.42<H>    Ca    9.5      14 Apr 2023 06:10  Phos  3.9     04-14  Mg     2.1     04-14    TPro  8.0  /  Alb  2.3<L>  /  TBili  0.4  /  DBili  x   /  AST  230<H>  /  ALT  98<H>  /  AlkPhos  52  04-14    PT/INR - ( 13 Apr 2023 17:53 )   PT: 13.5 sec;   INR: 1.13          PTT - ( 13 Apr 2023 17:53 )  PTT:29.2 sec  Cultures       67yo M no known PMH (possible cardiac) here w/ c/o generalized weakness, body pain, and unable to swallow x 6 months with a gradual 50lb weight loss admitted for FTT weakness and dysphagia work up. He had US scrotal obtained on 4/14/23 that showed a 1.7 cm hypervascular solid lesion in the right testicle suspicious for neoplasm.    -His US scrotal was reviewed  -Testicular markers to be obtained, AFP, bhCG and LDH      HPI:  67yo M here w/ c/o generalized weakness, body pain, and unable to swallow x 6 months with a gradual 50lb weight loss. All of his issues started 1 year ago per pt he started to have lower extremity swelling, was started on lasix and subsequently lost to f/u so stoppe dhis lasix and then started to have facial swelling 4 months ago. He said he had a sonogram of his heart and was told something was abnormal but does not remember what.  After that he had difficulty swallowing solid foods which has progressed to sometimes thin foods as well. He saw his gastroenterologist Dr Meier who told him he was having aspiration but he never had imaging or work up. He took antibiotics for 'aspiration' but said that they did not help because he never had much of a cough or fever.  [facility] was [facility]. He has been having falls about 3 for the past few months. States had spine imaging that he did not bring of his thoracic spine that was totally normal, weakness attributed to dehydation/weight loss not spinal. Pt denies any fever, chills, n/v, diarrhea/ constipation.      In the ED:  Vitals 98.6  /77 RA 95%   Labs: WBC 11.99 Hg 10.4 Plt 453 PT 13.5 INR 1.13 Na 133 K 4.4 Cl 97 Bicarb 26 AG 22 Cr 1.36   TSH 12.64   Imaging: CTAP  1.  Mild distention of the esophagus with layering fluid, with multifocal   patchy consolidative and groundglass opacities bilaterally, most likely   representing aspiration pneumonia.  2.  Partially visualized indeterminate 1.7 cm hyperdense lesion in the   right hemiscrotum, suspicious for malignancy. Recommend further   evaluation with scrotal ultrasound.  3.  Circumferential wall thickening of the urinary bladder, possibly   reflecting under distention, although correlation with urinalysis is   recommended to exclude underlying acute infectious/inflammatory cystitis.  4.  Diffuse subcutaneous edema.  Ct soft tissue   1.  Limited intracranial images demonstrate questionable 1.1 cm   rim-enhancing hypodense lesion in the posterior left parietal lobe seen   only on axial images. Differential includes malignancy/metastasis versus   less likely infection in this assumed immunocompetent patient. Recommend   further evaluation with contrast-enhanced MRI of the brain.  2.  Layering fluid within the esophagus with patchy consolidations in the   included bilateral upper lobes, most likely representing aspiration.   (14 Apr 2023 02:32)    Urology Consult Note  67yo M no known PMH (possible cardiac) here w/ c/o generalized weakness, body pain, and unable to swallow x 6 months with a gradual 50lb weight loss admitted for FTT weakness and dysphagia work up. He had a CT abdomen/pelvis with IV contrast on 4/13/23, partially imaged scrotum/testicles, hyperenhancing right hemiscrotal nodule may represent normal testicle or testicular mass, cannot be accurately assessed since contralateral left hemiscrotum is not included in field-of-view. He had CT chest with IV contrast imaging as well. He had US scrotal obtained on 4/14/23 that showed a 1.7 cm hypervascular solid lesion in the right testicle suspicious for neoplasm. He has no family history of urologic cancers. He has nocturia but no other urinary symptoms. He has never seen an urologist before. He reports no childhood testicular issues.     Vital Signs Last 24 Hrs  T(C): 36.4 (14 Apr 2023 16:05), Max: 36.9 (13 Apr 2023 18:44)  T(F): 97.5 (14 Apr 2023 16:05), Max: 98.5 (13 Apr 2023 18:44)  HR: 93 (14 Apr 2023 16:05) (91 - 97)  BP: 120/78 (14 Apr 2023 16:05) (115/73 - 148/92)  BP(mean): 92 (14 Apr 2023 01:48) (92 - 92)  RR: 18 (14 Apr 2023 16:05) (18 - 20)  SpO2: 95% (14 Apr 2023 16:05) (91% - 98%)    Parameters below as of 14 Apr 2023 16:05  Patient On (Oxygen Delivery Method): nasal cannula  O2 Flow (L/min): 2    I&O's Summary    13 Apr 2023 07:01  -  14 Apr 2023 07:00  --------------------------------------------------------  IN: 450 mL / OUT: 0 mL / NET: 450 mL        PE:  Gen: A&O x 3  Abd: soft  :    LABS:                        9.7    11.76 )-----------( 462      ( 14 Apr 2023 06:10 )             28.9     04-14    133<L>  |  98  |  22  ----------------------------<  81  4.2   |  24  |  1.42<H>    Ca    9.5      14 Apr 2023 06:10  Phos  3.9     04-14  Mg     2.1     04-14    TPro  8.0  /  Alb  2.3<L>  /  TBili  0.4  /  DBili  x   /  AST  230<H>  /  ALT  98<H>  /  AlkPhos  52  04-14    PT/INR - ( 13 Apr 2023 17:53 )   PT: 13.5 sec;   INR: 1.13          PTT - ( 13 Apr 2023 17:53 )  PTT:29.2 sec  Cultures      67yo M no known PMH (possible cardiac) here w/ c/o generalized weakness, body pain, and unable to swallow x 6 months with a gradual 50lb weight loss admitted for FTT weakness and dysphagia work up. He had US scrotal obtained on 4/14/23 that showed a 1.7 cm hypervascular solid lesion in the right testicle suspicious for neoplasm.    -His US scrotal was reviewed and findings reviewed with the patient.   -Testicular markers to be obtained in AM, AFP, bhCG and LDH        HPI:  67yo M here w/ c/o generalized weakness, body pain, and unable to swallow x 6 months with a gradual 50lb weight loss. All of his issues started 1 year ago per pt he started to have lower extremity swelling, was started on lasix and subsequently lost to f/u so stoppe dhis lasix and then started to have facial swelling 4 months ago. He said he had a sonogram of his heart and was told something was abnormal but does not remember what.  After that he had difficulty swallowing solid foods which has progressed to sometimes thin foods as well. He saw his gastroenterologist Dr Meier who told him he was having aspiration but he never had imaging or work up. He took antibiotics for 'aspiration' but said that they did not help because he never had much of a cough or fever.  He was sent to Dr. Qu[facility]ñones today [facility]. He has been having falls about 3 for the past few months. States had spine imaging that he did not bring of his thoracic spine that was totally normal, weakness attributed to dehydation/weight loss not spinal. Pt denies any fever, chills, n/v, diarrhea/ constipation.      In the ED:  Vitals 98.6  /77 RA 95%   Labs: WBC 11.99 Hg 10.4 Plt 453 PT 13.5 INR 1.13 Na 133 K 4.4 Cl 97 Bicarb 26 AG 22 Cr 1.36   TSH 12.64   Imaging: CTAP  1.  Mild distention of the esophagus with layering fluid, with multifocal   patchy consolidative and groundglass opacities bilaterally, most likely   representing aspiration pneumonia.  2.  Partially visualized indeterminate 1.7 cm hyperdense lesion in the   right hemiscrotum, suspicious for malignancy. Recommend further   evaluation with scrotal ultrasound.  3.  Circumferential wall thickening of the urinary bladder, possibly   reflecting under distention, although correlation with urinalysis is   recommended to exclude underlying acute infectious/inflammatory cystitis.  4.  Diffuse subcutaneous edema.  Ct soft tissue   1.  Limited intracranial images demonstrate questionable 1.1 cm   rim-enhancing hypodense lesion in the posterior left parietal lobe seen   only on axial images. Differential includes malignancy/metastasis versus   less likely infection in this assumed immunocompetent patient. Recommend   further evaluation with contrast-enhanced MRI of the brain.  2.  Layering fluid within the esophagus with patchy consolidations in the   included bilateral upper lobes, most likely representing aspiration.   (14 Apr 2023 02:32)    Urology Consult Note  67yo M no known PMH (possible cardiac) here w/ c/o generalized weakness, body pain, and unable to swallow x 6 months with a gradual 50lb weight loss admitted for FTT weakness and dysphagia work up. He had a CT abdomen/pelvis with IV contrast on 4/13/23, partially imaged scrotum/testicles, hyperenhancing right hemiscrotal nodule may represent normal testicle or testicular mass, cannot be accurately assessed since contralateral left hemiscrotum is not included in field-of-view. He had CT chest with IV contrast imaging as well. He had US scrotal obtained on 4/14/23 that showed a 1.7 cm hypervascular solid lesion in the right testicle suspicious for neoplasm. He has no family history of urologic cancers. He has nocturia but no other urinary symptoms. He has never seen an urologist before. He reports no childhood testicular issues.     Vital Signs Last 24 Hrs  T(C): 36.4 (14 Apr 2023 16:05), Max: 36.9 (13 Apr 2023 18:44)  T(F): 97.5 (14 Apr 2023 16:05), Max: 98.5 (13 Apr 2023 18:44)  HR: 93 (14 Apr 2023 16:05) (91 - 97)  BP: 120/78 (14 Apr 2023 16:05) (115/73 - 148/92)  BP(mean): 92 (14 Apr 2023 01:48) (92 - 92)  RR: 18 (14 Apr 2023 16:05) (18 - 20)  SpO2: 95% (14 Apr 2023 16:05) (91% - 98%)    Parameters below as of 14 Apr 2023 16:05  Patient On (Oxygen Delivery Method): nasal cannula  O2 Flow (L/min): 2    I&O's Summary    13 Apr 2023 07:01  -  14 Apr 2023 07:00  --------------------------------------------------------  IN: 450 mL / OUT: 0 mL / NET: 450 mL        PE:  Gen: A&O x 3  Abd: soft, NT, ND  : b/l descended testicles firm to palpation bilaterally, no tenderness bilaterally, cords palpable bilaterally, unable to appreciate mass in right testicle. Normal uncircum penis    LABS:                        9.7    11.76 )-----------( 462      ( 14 Apr 2023 06:10 )             28.9     04-14    133<L>  |  98  |  22  ----------------------------<  81  4.2   |  24  |  1.42<H>    Ca    9.5      14 Apr 2023 06:10  Phos  3.9     04-14  Mg     2.1     04-14    TPro  8.0  /  Alb  2.3<L>  /  TBili  0.4  /  DBili  x   /  AST  230<H>  /  ALT  98<H>  /  AlkPhos  52  04-14    PT/INR - ( 13 Apr 2023 17:53 )   PT: 13.5 sec;   INR: 1.13          PTT - ( 13 Apr 2023 17:53 )  PTT:29.2 sec  Cultures           HPI:  67yo M here w/ c/o generalized weakness, body pain, and unable to swallow x 6 months with a gradual 50lb weight loss. All of his issues started 1 year ago per pt he started to have lower extremity swelling, was started on lasix and subsequently lost to f/u so stopped his lasix and then started to have facial swelling 4 months ago. He said he had a sonogram of his heart and was told something was abnormal but does not remember what.  After that he had difficulty swallowing solid foods which has progressed to sometimes thin foods as well. He saw his gastroenterologist Dr Meier who told him he was having aspiration but he never had imaging or work up. He took antibiotics for 'aspiration' but said that they did not help because he never had much of a cough or fever.  He was [facility] to Dr. [facility]ones [facility]. He has been having falls about 3 for the past few months. States had spine imaging that he did not bring of his thoracic spine that was totally normal, weakness attributed to dehydation/weight loss not spinal. Pt denies any fever, chills, n/v, diarrhea/ constipation.      In the ED:  Vitals 98.6  /77 RA 95%   Labs: WBC 11.99 Hg 10.4 Plt 453 PT 13.5 INR 1.13 Na 133 K 4.4 Cl 97 Bicarb 26 AG 22 Cr 1.36   TSH 12.64   Imaging: CTAP  1.  Mild distention of the esophagus with layering fluid, with multifocal   patchy consolidative and groundglass opacities bilaterally, most likely   representing aspiration pneumonia.  2.  Partially visualized indeterminate 1.7 cm hyperdense lesion in the   right hemiscrotum, suspicious for malignancy. Recommend further   evaluation with scrotal ultrasound.  3.  Circumferential wall thickening of the urinary bladder, possibly   reflecting under distention, although correlation with urinalysis is   recommended to exclude underlying acute infectious/inflammatory cystitis.  4.  Diffuse subcutaneous edema.  Ct soft tissue   1.  Limited intracranial images demonstrate questionable 1.1 cm   rim-enhancing hypodense lesion in the posterior left parietal lobe seen   only on axial images. Differential includes malignancy/metastasis versus   less likely infection in this assumed immunocompetent patient. Recommend   further evaluation with contrast-enhanced MRI of the brain.  2.  Layering fluid within the esophagus with patchy consolidations in the   included bilateral upper lobes, most likely representing aspiration.   (14 Apr 2023 02:32)    Urology Consult Note  67yo M no known PMH (possible cardiac) here w/ c/o generalized weakness, body pain, and unable to swallow x 6 months with a gradual 50lb weight loss admitted for FTT weakness and dysphagia work up. He had a CT abdomen/pelvis with IV contrast on 4/13/23, partially imaged scrotum/testicles, hyperenhancing right hemiscrotal nodule may represent normal testicle or testicular mass, cannot be accurately assessed since contralateral left hemiscrotum is not included in field-of-view. He had CT chest with IV contrast imaging as well. He had US scrotal obtained on 4/14/23 that showed a 1.7 cm hypervascular solid lesion in the right testicle suspicious for neoplasm. He has no family history of urologic cancers. He has nocturia but no other urinary symptoms. He has never seen an urologist before. He reports no childhood testicular issues.     Vital Signs Last 24 Hrs  T(C): 36.4 (14 Apr 2023 16:05), Max: 36.9 (13 Apr 2023 18:44)  T(F): 97.5 (14 Apr 2023 16:05), Max: 98.5 (13 Apr 2023 18:44)  HR: 93 (14 Apr 2023 16:05) (91 - 97)  BP: 120/78 (14 Apr 2023 16:05) (115/73 - 148/92)  BP(mean): 92 (14 Apr 2023 01:48) (92 - 92)  RR: 18 (14 Apr 2023 16:05) (18 - 20)  SpO2: 95% (14 Apr 2023 16:05) (91% - 98%)    Parameters below as of 14 Apr 2023 16:05  Patient On (Oxygen Delivery Method): nasal cannula  O2 Flow (L/min): 2    I&O's Summary    13 Apr 2023 07:01  -  14 Apr 2023 07:00  --------------------------------------------------------  IN: 450 mL / OUT: 0 mL / NET: 450 mL        PE:  Gen: A&O x 3  Abd: soft, NT, ND  : b/l descended testicles firm to palpation bilaterally, no tenderness bilaterally, cords palpable bilaterally, unable to appreciate mass in right testicle. Normal uncircum penis    LABS:                        9.7    11.76 )-----------( 462      ( 14 Apr 2023 06:10 )             28.9     04-14    133<L>  |  98  |  22  ----------------------------<  81  4.2   |  24  |  1.42<H>    Ca    9.5      14 Apr 2023 06:10  Phos  3.9     04-14  Mg     2.1     04-14    TPro  8.0  /  Alb  2.3<L>  /  TBili  0.4  /  DBili  x   /  AST  230<H>  /  ALT  98<H>  /  AlkPhos  52  04-14    PT/INR - ( 13 Apr 2023 17:53 )   PT: 13.5 sec;   INR: 1.13          PTT - ( 13 Apr 2023 17:53 )  PTT:29.2 sec  Cultures

## 2023-04-14 NOTE — SWALLOW BEDSIDE ASSESSMENT ADULT - PHARYNGEAL PHASE
Multiple swallows palpated across consistencies, indicative of pharyngeal clearance deficits. Throat clearing across consistencies, indicative of aspiration.

## 2023-04-14 NOTE — PROGRESS NOTE ADULT - PROBLEM SELECTOR PLAN 7
Patient with testicular mass seen suspicious for malignancy on imaging, had not noticed it himself. Denies urinary sx on admission and at this time.  - testicular ultrasound  - consult urology

## 2023-04-14 NOTE — PROGRESS NOTE ADULT - SUBJECTIVE AND OBJECTIVE BOX
***INCOMPLETE NOTE    SUBJECTIVE / INTERVAL HPI: Patient seen and examined at bedside. No overnight events.    VITAL SIGNS:  Vital Signs Last 24 Hrs  T(C): 36.7 (2023 06:34), Max: 37 (2023 16:28)  T(F): 98.1 (2023 06:34), Max: 98.6 (2023 16:28)  HR: 96 (2023 06:55) (91 - 103)  BP: 119/72 (2023 06:34) (115/73 - 148/92)  BP(mean): 92 (2023 01:48) (92 - 92)  RR: 18 (2023 06:55) (18 - 20)  SpO2: 98% (2023 06:55) (91% - 98%)    Parameters below as of 2023 06:55  Patient On (Oxygen Delivery Method): nasal cannula  O2 Flow (L/min): 2      PHYSICAL EXAM:    General: Resting comfortably in bed; NAD  HEENT: NC/AT, EOMI, anicteric sclera, MMM, neck supple, no nasal discharge  Cardiac: RRR; normal S1/S2, no MRG, no LE edema, no JVD  Respiratory: CTAB; no wheezes, ronchi, increased work of breathing, retractions  Gastrointestinal: +BSx4, abdomen soft, NT/ND; no rebound or guarding  Genitourinary: no suprapubic tenderness; polanco*; normal external genitalia  Extremities: WWP, no clubbing or cyanosis; no peripheral edema  Vascular: 2+ radial and DP pulses bilaterally  Dermatologic: skin warm, dry and intact; no rashes, open wounds  Neurologic: AAOx3; no focal deficits  Psychiatric: affect and characteristics of appearance, verbalizations, behaviors are appropriate    MEDICATIONS:  MEDICATIONS  (STANDING):  cefTRIAXone   IVPB 2000 milliGRAM(s) IV Intermittent every 24 hours  enoxaparin Injectable 40 milliGRAM(s) SubCutaneous every 24 hours  nystatin    Suspension 024886 Unit(s) Oral every 24 hours  sodium chloride 0.9%. 500 milliLiter(s) (100 mL/Hr) IV Continuous <Continuous>    MEDICATIONS  (PRN):      ALLERGIES:  Allergies    No Known Allergies    Intolerances        LABS:                        9.7    11.76 )-----------( 462      ( 2023 06:10 )             28.9     04-14    133<L>  |  98  |  22  ----------------------------<  81  4.2   |  24  |  1.42<H>    Ca    9.5      2023 06:10  Phos  3.9     04-14  Mg     2.1     04-14    TPro  8.0  /  Alb  2.3<L>  /  TBili  0.4  /  DBili  x   /  AST  230<H>  /  ALT  98<H>  /  AlkPhos  52  04-14    PT/INR - ( 2023 17:53 )   PT: 13.5 sec;   INR: 1.13          PTT - ( 2023 17:53 )  PTT:29.2 sec  Urinalysis Basic - ( 2023 17:53 )    Color: Yellow / Appearance: Clear / S.020 / pH: x  Gluc: x / Ketone: NEGATIVE  / Bili: Negative / Urobili: 0.2 E.U./dL   Blood: x / Protein: 100 mg/dL / Nitrite: NEGATIVE   Leuk Esterase: NEGATIVE / RBC: < 5 /HPF / WBC < 5 /HPF   Sq Epi: x / Non Sq Epi: x / Bacteria: Present /HPF      CAPILLARY BLOOD GLUCOSE      POCT Blood Glucose.: 84 mg/dL (2023 06:24)      RADIOLOGY & ADDITIONAL TESTS: Reviewed.   SUBJECTIVE / INTERVAL HPI: Patient seen and examined at bedside. No overnight events. Confirmed history with patient. Additional history revealed that patient was referred to ENT and got a laryngoscope, but unclear on results. Family also unsure as to why they needed surgery referral. Patient otherwise feels well besides swelling, fatigue, neck droop and dysphagia. Denies fever, sweats, cough, SOB, CP, abdominal pain, dysuria, urinary urgency. Wife at bedside throughout interview.    VITAL SIGNS:  Vital Signs Last 24 Hrs  T(C): 36.7 (2023 06:34), Max: 37 (2023 16:28)  T(F): 98.1 (2023 06:34), Max: 98.6 (2023 16:28)  HR: 96 (2023 06:55) (91 - 103)  BP: 119/72 (2023 06:34) (115/73 - 148/92)  BP(mean): 92 (2023 01:48) (92 - 92)  RR: 18 (2023 06:55) (18 - 20)  SpO2: 98% (2023 06:55) (91% - 98%)    Parameters below as of 2023 06:55  Patient On (Oxygen Delivery Method): nasal cannula  O2 Flow (L/min): 2      PHYSICAL EXAM:    General: Resting comfortably in bed; NAD  HEENT: NC/AT, anicteric sclera, MMM, neck supple, no nasal discharge, oral thrush  Cardiac: RRR; normal S1/S2, no MRG, minimal nonpitting LE edema  Respiratory: CTAB anteriorly (pt with difficulty sitting upright); no wheezes, ronchi, increased work of breathing, retractions  Gastrointestinal: +BSx4, abdomen soft, NT/ND; no rebound or guarding  Genitourinary: no suprapubic tenderness  Extremities: WWP x4, no clubbing or cyanosis; BL upper extremity edema, minimal nonpitting LE edema  Vascular: 2+ radial and DP pulses bilaterally  Dermatologic: skin warm, dry and intact; no rashes, open wounds  Neurologic: AAOx3; PERRLA, EOMI, sensation intact V1-V3 bl, pt with difficulty doing full smile (possible R sided minimal droop), eyes closed tight symmetrically, uvula midline, full shoulder shrug; Motor: BL UE antigravity at shoulder; able to flex/extend against gravity in forearms; BL LE 5/5; sensation intact bilaterally to light touch    MEDICATIONS:  MEDICATIONS  (STANDING):  cefTRIAXone   IVPB 2000 milliGRAM(s) IV Intermittent every 24 hours  enoxaparin Injectable 40 milliGRAM(s) SubCutaneous every 24 hours  nystatin    Suspension 971238 Unit(s) Oral every 24 hours  sodium chloride 0.9%. 500 milliLiter(s) (100 mL/Hr) IV Continuous <Continuous>    MEDICATIONS  (PRN):      ALLERGIES:  Allergies    No Known Allergies    Intolerances        LABS:                        9.7    11.76 )-----------( 462      ( 2023 06:10 )             28.9     04-14    133<L>  |  98  |  22  ----------------------------<  81  4.2   |  24  |  1.42<H>    Ca    9.5      2023 06:10  Phos  3.9     04-14  Mg     2.1     04-14    TPro  8.0  /  Alb  2.3<L>  /  TBili  0.4  /  DBili  x   /  AST  230<H>  /  ALT  98<H>  /  AlkPhos  52  04-14    PT/INR - ( 2023 17:53 )   PT: 13.5 sec;   INR: 1.13          PTT - ( 2023 17:53 )  PTT:29.2 sec  Urinalysis Basic - ( 2023 17:53 )    Color: Yellow / Appearance: Clear / S.020 / pH: x  Gluc: x / Ketone: NEGATIVE  / Bili: Negative / Urobili: 0.2 E.U./dL   Blood: x / Protein: 100 mg/dL / Nitrite: NEGATIVE   Leuk Esterase: NEGATIVE / RBC: < 5 /HPF / WBC < 5 /HPF   Sq Epi: x / Non Sq Epi: x / Bacteria: Present /HPF      CAPILLARY BLOOD GLUCOSE      POCT Blood Glucose.: 84 mg/dL (2023 06:24)      RADIOLOGY & ADDITIONAL TESTS: Reviewed.

## 2023-04-14 NOTE — SWALLOW VFSS/MBS ASSESSMENT ADULT - PHARYNGEAL PHASE COMMENTS
Phayrngeal phase marked by mildly delayed swallow initiation to the posterior laryngeal surface of the epiglottis, reduced tongue base retraction, reduced hyolaryngeal elevation and excursion, severely reduced pharyngeal stripping wave/pharyngeal pressure generation leading to moderate pharyngeal residue across consistencies which improved to mild-moderate with multiple dry swallows. Pt observed to have fixed laryngeal penetration of thin and mildly thick liquids during the swallow d/t reduced laryngeal vestibule closure and after the swallow d/t pyriform sinus residue in addition to minimal retrograde bolus flow through the PES.

## 2023-04-14 NOTE — H&P ADULT - PROBLEM SELECTOR PLAN 7
patient with testicular mass seen on imaging  had not noticed it himself  no urinary sx and this time  -consult uro in am  -testicular ultrasound patient with testicular mass seen on imaging  had not noticed it himself  no urinary sx and this time  -consult uro in am  -testicular ultrasound  -consult heme onc

## 2023-04-14 NOTE — PHYSICAL THERAPY INITIAL EVALUATION ADULT - TRANSFER SKILLS, REHAB EVAL
Medication: NORTRIPTYLINE HCL 10 MG    Date of last refill: 2/14/19 (#60/1)   [place]     Last office visit: 3/19/2019  Due back to clinic per last office note:  2 months  Date next office visit scheduled:    Future Appo
needed assist

## 2023-04-14 NOTE — H&P ADULT - PROBLEM SELECTOR PLAN 9
pt with hx of progressive edema in b/l upper lower extremities and facial area  was on hctz which he said helped but he stopped seeing pcp  does not remember what tte shows but states its normal  -f/u tte  -f/u b/l le dopplers pt with hx of progressive edema in b/l upper lower extremities and facial area  was on hctz which he said helped but he stopped seeing pcp  does not remember what tte shows but states its normal  -f/u tte  -f/u b/l le dopplers  -f/u BNP pt with hx of progressive edema in b/l upper lower extremities and facial area  was on hctz which he said helped but he stopped seeing pcp  does not remember what tte shows but states its normal  -f/u tte  -f/u b/l le dopplers  -f/u BNP  - consider albumin

## 2023-04-14 NOTE — H&P ADULT - NSHPPHYSICALEXAM_GEN_ALL_CORE
PHYSICAL EXAM:    GENERAL: Comfortable, no acute distress   HEAD:  Normocephalic, atraumatic  EYES: EOMI, PERRLA  HEENT: Moist mucous membranes  NECK: Supple, No JVD  NERVOUS SYSTEM:  Alert & Oriented X3, Motor Strength 3/5 B/L upper and lower extremities  CHEST/LUNG: Clear to auscultation bilaterally  HEART: Regular rate and rhythm  ABDOMEN: Soft, non tender, Nondistended, Bowel sounds present  GENITOURINARY: Voiding, no palpable bladder  EXTREMITIES:   non pitting edema in all 4 extremities  MUSCULOSKELETAL- No muscle tenderness, no joint tenderness  SKIN-thickening at finger tips

## 2023-04-14 NOTE — CONSULT NOTE ADULT - SUBJECTIVE AND OBJECTIVE BOX
CHIEF COMPLAINT/ REASON FOR CONSULTATION:      HPI:  69yo M here c/o generalized weakness, body pain, and unable to swallow x 6 months with a gradual 50lb weight loss. All of his issues started 1 year ago per pt he started to have lower extremity swelling, was started on lasix and subsequently lost to f/u so stopped his Lasix and then started to have facial swelling 4 months ago. He said he had a sonogram of his heart and was told something was abnormal but does not remember what.  After that he had difficulty swallowing solid foods which has progressed to sometimes thin foods as well. He saw his gastroenterologist Dr Meier who told him he was having aspiration but he never had imaging or work up. He took antibiotics for 'aspiration' but said that they did not help because he never had much of a cough or fever.  [facility] was [facility]. He has been having falls about 3 for the past few months. States had spine imaging that he did not bring of his thoracic spine that was totally normal, weakness attributed to dehydration/weight loss not spinal. Pt denies any fever, chills, n/v, diarrhea/ constipation.    Pt presents to neurosurgery team c/o B/L UE>LE weakness and neck pain x 2-3 months. Pt states he is R handed, and hs been having difficulty with fine motor movements with hands as well as lifting objects. Pt says this UE weakness is also accompanied by numbness in his fingertips, and occasional numbness in his arms. Pt reports he has also had difficulty ambulating and has sustained several falls over the past month. He denies using assistive devices for ambulation at home. In regards to the neck pain, Pt states he has had pain with keeping his head upright. He has been wearing a brace he bought and taking Tylenol PRN, which provides some relief. Pt has not seen a Neurologist or Orthopedist for these issues. Denies dizziness, HA, vision changes, nausea, speech difficulties, LOC.     In the ED:  Vitals 98.6  /77 RA 95%   Labs: WBC 11.99 Hg 10.4 Plt 453 PT 13.5 INR 1.13 Na 133 K 4.4 Cl 97 Bicarb 26 AG 22 Cr 1.36   TSH 12.64   Imaging: CTAP  1.  Mild distention of the esophagus with layering fluid, with multifocal   patchy consolidative and groundglass opacities bilaterally, most likely   representing aspiration pneumonia.  2.  Partially visualized indeterminate 1.7 cm hyperdense lesion in the   right hemiscrotum, suspicious for malignancy. Recommend further   evaluation with scrotal ultrasound.  3.  Circumferential wall thickening of the urinary bladder, possibly   reflecting under distention, although correlation with urinalysis is   recommended to exclude underlying acute infectious/inflammatory cystitis.  4.  Diffuse subcutaneous edema.  Ct soft tissue   1.  Limited intracranial images demonstrate questionable 1.1 cm   rim-enhancing hypodense lesion in the posterior left parietal lobe seen   only on axial images. Differential includes malignancy/metastasis versus   less likely infection in this assumed immunocompetent patient. Recommend   further evaluation with contrast-enhanced MRI of the brain.  2.  Layering fluid within the esophagus with patchy consolidations in the   included bilateral upper lobes, most likely representing aspiration.   (2023 02:32)    PAST MEDICAL HISTORY   No pertinent past medical history      PAST SURGICAL HISTORY   No significant past surgical history      No Known Allergies      MEDICATIONS:  Antibiotics:  cefTRIAXone   IVPB 2000 milliGRAM(s) IV Intermittent every 24 hours  nystatin    Suspension 870600 Unit(s) Oral every 24 hours    Neuro:    Anticoagulation:  enoxaparin Injectable 40 milliGRAM(s) SubCutaneous every 24 hours    Other:  sodium chloride 0.9%. 500 milliLiter(s) IV Continuous <Continuous>      SOCIAL HISTORY:   Occupation:   Marital Status:     FAMILY HISTORY:  Aunt passed of Lung CA    REVIEW OF SYSTEMS:  Check here if all are normal other than Neurological [X]    PHYSICAL EXAMINATION:   T(C): 36.7 (23 @ 06:34), Max: 37 (23 @ 16:28)  HR: 96 (23 @ 06:55) (91 - 103)  BP: 119/72 (23 @ 06:34) (115/73 - 148/92)  RR: 18 (23 @ 06:55) (18 - 20)  SpO2: 98% (23 @ 06:55) (91% - 98%)  Wt(kg): --Height (cm): 183.4 ( @ 01:48)  Weight (kg): 71.8 ( @ 01:48)    EXAM:  General: Pt is sitting up comfortably in chair, in NAD, on NC 2L  HEENT: PERRL 3mm, EOMI B/L, face symmetric, tongue midline, neck slightly decreased ROM 2/2 pain  Cardiovascular: RRR, normal S1 and S2   Respiratory: on NC, nonloabored breathing, symmetric chest rise   Neuro: A&O x 3, no aphasia, speech clear, no dysmetria, unable to asses pronator drift  Strength 2/5 B/L deltoids, 4-4+/5 B/L biceps/triceps/HG  Sensation intact to light touch throughout   Vascular: Distal pulses 2+ x4, no calf edema or erythema    LABS:                        9.7    11.76 )-----------( 462      ( 2023 06:10 )             28.9     04-14    133<L>  |  98  |  22  ----------------------------<  81  4.2   |  24  |  1.42<H>    Ca    9.5      2023 06:10  Phos  3.9     04-14  Mg     2.1     04-14    TPro  8.0  /  Alb  2.3<L>  /  TBili  0.4  /  DBili  x   /  AST  230<H>  /  ALT  98<H>  /  AlkPhos  52  04-14    PT/INR - ( 2023 17:53 )   PT: 13.5 sec;   INR: 1.13          PTT - ( 2023 17:53 )  PTT:29.2 sec  Urinalysis Basic - ( 2023 17:53 )    Color: Yellow / Appearance: Clear / S.020 / pH: x  Gluc: x / Ketone: NEGATIVE  / Bili: Negative / Urobili: 0.2 E.U./dL   Blood: x / Protein: 100 mg/dL / Nitrite: NEGATIVE   Leuk Esterase: NEGATIVE / RBC: < 5 /HPF / WBC < 5 /HPF   Sq Epi: x / Non Sq Epi: x / Bacteria: Present /HPF        RADIOLOGY & ADDITIONAL STUDIES:       CHIEF COMPLAINT/ REASON FOR CONSULTATION:      HPI:  69yo M here c/o generalized weakness, body pain, and unable to swallow x 6 months with a gradual 50lb weight loss. All of his issues started 1 year ago per pt he started to have lower extremity swelling, was started on lasix and subsequently lost to f/u so stopped his Lasix and then started to have facial swelling 4 months ago. He said he had a sonogram of his heart and was told something was abnormal but does not remember what.  After that he had difficulty swallowing solid foods which has progressed to sometimes thin foods as well. He saw his gastroenterologist Dr Meier who told him he was having aspiration but he never had imaging or work up. He took antibiotics for 'aspiration' but said that they did not help because he never had much of a cough or fever.  [facility]. He has been having falls about 3 for the past few months. States had spine imaging that he did not bring of his thoracic spine that was totally normal, weakness attributed to dehydration/weight loss not spinal. Pt denies any fever, chills, n/v, diarrhea/ constipation.    Pt presents to neurosurgery team c/o B/L UE>LE weakness and neck pain x 2-3 months. Pt states he is R handed, and hs been having difficulty with fine motor movements with hands as well as lifting objects. Pt says this UE weakness is also accompanied by numbness in his fingertips, and occasional numbness in his arms. Pt reports he has also had difficulty ambulating and has sustained several falls over the past month. He denies using assistive devices for ambulation at home. In regards to the neck pain, Pt states he has had pain with keeping his head upright. He has been wearing a brace he bought and taking Tylenol PRN, which provides some relief. Pt has not seen a Neurologist or Orthopedist for these issues. Denies dizziness, HA, vision changes, nausea, speech difficulties, LOC.     In the ED:  Vitals 98.6  /77 RA 95%   Labs: WBC 11.99 Hg 10.4 Plt 453 PT 13.5 INR 1.13 Na 133 K 4.4 Cl 97 Bicarb 26 AG 22 Cr 1.36   TSH 12.64   Imaging: CTAP  1.  Mild distention of the esophagus with layering fluid, with multifocal   patchy consolidative and groundglass opacities bilaterally, most likely   representing aspiration pneumonia.  2.  Partially visualized indeterminate 1.7 cm hyperdense lesion in the   right hemiscrotum, suspicious for malignancy. Recommend further   evaluation with scrotal ultrasound.  3.  Circumferential wall thickening of the urinary bladder, possibly   reflecting under distention, although correlation with urinalysis is   recommended to exclude underlying acute infectious/inflammatory cystitis.  4.  Diffuse subcutaneous edema.  Ct soft tissue   1.  Limited intracranial images demonstrate questionable 1.1 cm   rim-enhancing hypodense lesion in the posterior left parietal lobe seen   only on axial images. Differential includes malignancy/metastasis versus   less likely infection in this assumed immunocompetent patient. Recommend   further evaluation with contrast-enhanced MRI of the brain.  2.  Layering fluid within the esophagus with patchy consolidations in the   included bilateral upper lobes, most likely representing aspiration.   (2023 02:32)    PAST MEDICAL HISTORY   No pertinent past medical history      PAST SURGICAL HISTORY   No significant past surgical history      No Known Allergies      MEDICATIONS:  Antibiotics:  cefTRIAXone   IVPB 2000 milliGRAM(s) IV Intermittent every 24 hours  nystatin    Suspension [id number] Unit(s) Oral every 24 hours    Neuro:    Anticoagulation:  enoxaparin Injectable 40 milliGRAM(s) SubCutaneous every 24 hours    Other:  sodium chloride 0.9%. 500 milliLiter(s) IV Continuous <Continuous>      SOCIAL HISTORY:   Occupation:   Marital Status:     FAMILY HISTORY:  Aunt passed of Lung CA    REVIEW OF SYSTEMS:  Check here if all are normal other than Neurological [X]    PHYSICAL EXAMINATION:   T(C): 36.7 (23 @ 06:34), Max: 37 (23 @ 16:28)  HR: 96 (23 @ 06:55) (91 - 103)  BP: 119/72 (23 @ 06:34) (115/73 - 148/92)  RR: 18 (23 @ 06:55) (18 - 20)  SpO2: 98% (23 @ 06:55) (91% - 98%)  Wt(kg): --Height (cm): 183.4 ( @ 01:48)  Weight (kg): 71.8 ( @ 01:48)    EXAM:  General: Pt is sitting up comfortably in chair, in NAD, on NC 2L  HEENT: PERRL 3mm, EOMI B/L, face symmetric, tongue midline, neck slightly decreased ROM 2/2 pain  Cardiovascular: RRR, normal S1 and S2   Respiratory: on NC, nonloabored breathing, symmetric chest rise   Neuro: A&O x 3, no aphasia, speech clear, no dysmetria, unable to asses pronator drift  Strength 2/5 B/L deltoids, 4-4+/5 B/L biceps/triceps/HG  Sensation intact to light touch throughout   Vascular: Distal pulses 2+ x4, no calf edema or erythema    LABS:                        9.7    11.76 )-----------( 462      ( 2023 06:10 )             28.9     04-14    133<L>  |  98  |  22  ----------------------------<  81  4.2   |  24  |  1.42<H>    Ca    9.5      2023 06:10  Phos  3.9     -14  Mg     2.1     -14    TPro  8.0  /  Alb  2.3<L>  /  TBili  0.4  /  DBili  x   /  AST  230<H>  /  ALT  98<H>  /  AlkPhos  52  04-14    PT/INR - ( 2023 17:53 )   PT: 13.5 sec;   INR: 1.13          PTT - ( 2023 17:53 )  PTT:29.2 sec  Urinalysis Basic - ( 2023 17:53 )    Color: Yellow / Appearance: Clear / S.020 / pH: x  Gluc: x / Ketone: NEGATIVE  / Bili: Negative / Urobili: 0.2 E.U./dL   Blood: x / Protein: 100 mg/dL / Nitrite: NEGATIVE   Leuk Esterase: NEGATIVE / RBC: < 5 /HPF / WBC < 5 /HPF   Sq Epi: x / Non Sq Epi: x / Bacteria: Present /HPF        RADIOLOGY & ADDITIONAL STUDIES:    < from: CT Neck Soft Tissue w/ IV Cont (23 @ 20:13) >    *  Cervical Spine: Multilevel degenerative changes. Trace anterolisthesis   of C7 on T1.    < end of copied text >    < from: CT Neck Soft Tissue w/ IV Cont (23 @ 20:13) >  FINAL  IMPRESSION:    No intracranial mass identified on final review. Focus questioned is   prominent sulcus or small ARACHNOID CYST, better seen on sagittal image   33 and coronal image 47.    No neckmass or lymphadenopathy. Fluid seen in the mid esophagus is   nonspecific.      < end of copied text >

## 2023-04-14 NOTE — H&P ADULT - HISTORY OF PRESENT ILLNESS
69yo M here w/ c/o generalized weakness, body pain, and unable to swallow x 6 months with a gradual 50lb weight loss. All of his issues started 1 year ago per pt he started to have lower extremity swelling, was started on lasix and subsequently lost to f/u so stoppe dhis lasix and then started to have facial swelling 4 months ago. He said he had a sonogram of his heart and was told something was abnormal but does not remember what.  After that he had difficulty swallowing solid foods which has progressed to sometimes thin foods as well. He saw his gastroenterologist Dr Meier who told him he was having aspiration but he never had imaging or work up. He took antibiotics for 'aspiration' but said that they did not help because he never had much of a cough or fever.  He was sent to Dr. Quiñones today [facility]. He has been having falls about 3 for the past few months. States had spine imaging that he did not bring of his thoracic spine that was totally normal, weakness attributed to dehydation/weight loss not spinal. Pt denies any fever, chills, n/v, diarrhea/ constipation.      In the ED:  Vitals 98.6  /77 RA 95%   Labs: WBC 11.99 Hg 10.4 Plt 453 PT 13.5 INR 1.13 Na 133 K 4.4 Cl 97 Bicarb 26 AG 22 Cr 1.36   TSH 12.64   Imaging: CTAP  1.  Mild distention of the esophagus with layering fluid, with multifocal   patchy consolidative and groundglass opacities bilaterally, most likely   representing aspiration pneumonia.  2.  Partially visualized indeterminate 1.7 cm hyperdense lesion in the   right hemiscrotum, suspicious for malignancy. Recommend further   evaluation with scrotal ultrasound.  3.  Circumferential wall thickening of the urinary bladder, possibly   reflecting under distention, although correlation with urinalysis is   recommended to exclude underlying acute infectious/inflammatory cystitis.  4.  Diffuse subcutaneous edema.  Ct soft tissue   1.  Limited intracranial images demonstrate questionable 1.1 cm   rim-enhancing hypodense lesion in the posterior left parietal lobe seen   only on axial images. Differential includes malignancy/metastasis versus   less likely infection in this assumed immunocompetent patient. Recommend   further evaluation with contrast-enhanced MRI of the brain.  2.  Layering fluid within the esophagus with patchy consolidations in the   included bilateral upper lobes, most likely representing aspiration.

## 2023-04-14 NOTE — SWALLOW VFSS/MBS ASSESSMENT ADULT - SLP PERTINENT HISTORY OF CURRENT PROBLEM
The patient is a 68 y.o. M who  presented with generalized weakness, body pain and inability to swallow x6 months with a gradual 50lb weight loss admitted for FTT, weakness, and dysphagia workup. CT Chest 4/13: mild fluid distended lower esophagus with multifocal patchy consolidative and ground glass opacities bilaterally with dependent and basilar distribution, most likely aspiration PNA.

## 2023-04-14 NOTE — SWALLOW BEDSIDE ASSESSMENT ADULT - SWALLOW EVAL: DIAGNOSIS
Suspect pharyngeal and/or esophageal dysphagia. Recommend MBSS Suspected pharyngeal and/or esophageal dysphagia given overt s/s of aspiration and reports of globus sensation. Recommend MBSS to further evaluate pharyngeal swallow function. Given intact cognition, self feeding abilities, and chronicity of symptoms, recommend allow thin liquids pending MBS. Suspected pharyngeal and/or esophageal dysphagia given overt s/s of aspiration and reports of globus sensation. Recommend MBSS to further evaluate pharyngeal swallow function. Given intact cognition, self feeding abilities, and chronicity of symptoms, recommend allow thin liquids pending MBSS.

## 2023-04-14 NOTE — CONSULT NOTE ADULT - ASSESSMENT
Pt is a 68 year old male who was admitted to medicine on 4/13/23 for FTT. Neurosurgery was consulted for Pt report of B/L UE weakness x 2 months, numbness/tingling in B/L hands, and B/L LE weakness x 1 month, as well as findings on initial CT imaging. CT neck with contrast performed on 4/13/23 report showed L posterior parietal lobe lesion and trace C7-T1 anterolisthesis.     Plan:  - Order MR stereotactic w/wo of brain and MR cervical spine w/wo  - Neurosurgery to follow

## 2023-04-14 NOTE — PROGRESS NOTE ADULT - ASSESSMENT
69yo male with no sig PMH PSH history presents with worsening weight loss, dysphagia, weakness and fatigue. Afebrile, HDS, exam significant for cachexia, abdomen soft, right testicular mass. Labs with WBC 12k, elevated TSH. CT demonstrating fluid level in esophagus, right testicular mass and concern for brain lesion. Differential includes metastatic neoplasm, unlikely esophageal, potentially testicular primary. Patient currently poorly nutritionally optimized.     - no acute surgical intervention  - Pending GI, NSGY, Urology recommendations   - Swallow assessment noted: pending MBSS  - Surgery Team 2C will continue to follow. Please page Team 2 with questions/clinical changes. [phone]

## 2023-04-14 NOTE — H&P ADULT - PROBLEM SELECTOR PLAN 1
patient presents with ongoing weakness for many months  no precipitating factor  had multiple falls over the course of a few months  does not use any devices at home to ambulate  TSH elevated on admission, however T4 normal  -f/u PT recs  -consult neuro in AM  -attain outside [facility]ri records  -f/u b12 folate  -possible malignancy seen on imaging with brain lesion & testicular lesion -> consider heme onc  -f/u gen surg recs patient presents with ongoing weakness for many months  no precipitating factor  had multiple falls over the course of a few months  does not use any devices at home to ambulate  TSH elevated on admission, however T4 normal  has neck stiffness & has been using a c collar at home  -f/u mr brain, mr c spine  -f/u nsgy recs   -f/u PT recs  -consult neuro in AM  -attain outside mri records  -f/u b12 folate  -possible malignancy seen on imaging with brain lesion & testicular lesion -> consider heme onc  -f/u gen surg recs patient presents with ongoing weakness for many months  no precipitating factor  had multiple falls over the course of a few months  does not use any devices at home to ambulate  TSH elevated on admission, however T4 normal  has neck stiffness & has been using a c collar at home for unclear reason  -f/u mr brain, mr c spine  -f/u nsgy recs   -f/u PT recs  -consult neuro in AM  -attain outside mri records  -f/u b12 folate  -possible malignancy seen on imaging with brain lesion & testicular lesion -> consider heme onc  -f/u gen surg recs  -f/u hiv testing

## 2023-04-14 NOTE — SWALLOW BEDSIDE ASSESSMENT ADULT - ESOPHAGEAL PHASE
Reported globus sensation with puree and soft and bite size, requiring multiple liquid washes to clear.

## 2023-04-14 NOTE — SWALLOW VFSS/MBS ASSESSMENT ADULT - RECOMMENDED FEEDING/EATING TECHNIQUES
position upright (90 degrees)/small sips/bites multiple swallows/position upright (90 degrees)/small sips/bites

## 2023-04-14 NOTE — PATIENT PROFILE ADULT - FALL HARM RISK - HARM RISK INTERVENTIONS
Assistance with ambulation/Assistance OOB with selected safe patient handling equipment/Communicate Risk of Fall with Harm to all staff/Discuss with provider need for PT consult/Monitor gait and stability/Reinforce activity limits and safety measures with patient and family/Tailored Fall Risk Interventions/Visual Cue: Yellow wristband and red socks/Bed in lowest position, wheels locked, appropriate side rails in place/Call bell, personal items and telephone in reach/Instruct patient to call for assistance before getting out of bed or chair/Non-slip footwear when patient is out of bed/[place] to call system/Physically safe environment - no spills, clutter or unnecessary equipment/Purposeful Proactive Rounding/Room/bathroom lighting operational, light cord in reach

## 2023-04-14 NOTE — H&P ADULT - PROBLEM SELECTOR PLAN 8
patient with b/l GGO seen on ctscan  consistent with likely aspiration pna  s/p abx outpatient  at this time will keep pt npo  -S+S eval  -ctm oxygen requirements patient with b/l GGO seen on ctscan  consistent with likely aspiration pna  s/p abx outpatient  at this time will keep pt npo  -S+S eval  -ctm oxygen requirements  -c/w ctx 2g

## 2023-04-14 NOTE — SWALLOW BEDSIDE ASSESSMENT ADULT - COMMENTS
INCOMPLETE NOTE Pt reports dysphagia x6 months with recent exacerbation complaining of meats "feeling stuck" and pureed feeling "stuck in throat" requiring a lot of water to wash down.

## 2023-04-14 NOTE — SWALLOW BEDSIDE ASSESSMENT ADULT - SLP PERTINENT HISTORY OF CURRENT PROBLEM
67yo M here w/ c/o generalized weakness, body pain, and unable to swallow x 6 months with a gradual 50lb weight loss admitted for FTT weakness and dysphagia work up.

## 2023-04-14 NOTE — SWALLOW BEDSIDE ASSESSMENT ADULT - SLP GENERAL OBSERVATIONS
Pt appreciated awake and alert in bed with 2L O2 via NC. Pt able to follow directions and participate in conversation. Pt's wife and family present.

## 2023-04-14 NOTE — PROGRESS NOTE ADULT - PROBLEM SELECTOR PLAN 4
Patient with CT showing 1.1 cm rim-enhancing hypodense lesion in the posterior left parietal lobe seen only on axial images on CT soft tissue of neck prelim read, but not on CTH prelim read. Ddx possible metastasis from malignancy, less likely infectious.  - consult neurosurgery for brain lesion and possible neck mass  - f/u final reads of imaging  - MRI brain and C spine w/ w/o contrast  - f/u HIV if pt consents

## 2023-04-14 NOTE — CONSULT NOTE ADULT - ASSESSMENT
69yo male with no known PMhx admitted with progressive weight loss, and odynophagia which appears to be during oropharyngeal phase    Labs notable for neutrophil predominant leukocytosis with microcytic anemia and elevated liver enzymes .   Esophagus is mildly dilated in prox and mid esophagus, though CT limited as PO contrast not administered. Liver nml appearing.  Uncertain ENT findings following laryngoscopy described, though sx seem to be localized to upper esophagus     Constellation of sx concerning for occult malignancy.  DDx remains broad and evaluation is ongoing with numerous services on consult    Recommendations:  -Appreciate SLP evaluation  -Agree with MBSS and esophagram to further evaluation  -Please check iron studies; replete as indicated  -Please obtain records from [facility]NT  -Further recommendations pending review of MBSS and esophagram  -GI will continue to follow    Dixon Liriano DO  Gastroenterology Fellow  Pager: [phone]       67yo male with no known PMhx admitted with progressive weight loss, and odynophagia with imaging thus far noting L parietal lobe lesion, mildly dilated prox/mid esophagus, Right testicular mass concerning for malignancy of unknown primary.    Labs notable for neutrophil predominant leukocytosis with microcytic anemia and elevated liver enzymes .   Esophagus is mildly dilated in prox and mid esophagus, though CT limited as PO contrast not administered. Liver nml appearing.  Uncertain ENT findings following laryngoscopy described, though sx seem to be localized to upper esophagus     DDx remains broad and evaluation is ongoing with numerous services on consult    Recommendations:  -Appreciate SLP evaluation  -Agree with MBSS and esophagram to further evaluation  -Please check iron studies; replete as indicated  -Please obtain records from [facility]NT  -Further recommendations pending review of MBSS and esophagram  -GI will continue to follow    Dixon Liriano DO  Gastroenterology Fellow  Pager: [phone]       69yo male with no known PMhx admitted with progressive weight loss, and oropharyngeal dysphagia with imaging thus far noting L parietal lobe lesion, mildly dilated prox/mid esophagus, Right testicular mass concerning for malignancy of unknown primary.    Labs notable for neutrophil predominant leukocytosis with microcytic anemia and elevated liver enzymes .   Esophagus is mildly dilated in prox and mid esophagus, though CT limited as PO contrast not administered. Liver nml appearing.  Uncertain ENT findings following laryngoscopy described, though sx seem to be localized to upper esophagus     DDx remains broad and evaluation is ongoing with numerous services on consult    Recommendations:  -Appreciate SLP evaluation  -Agree with MBSS and esophagram to further evaluation  -Please check iron studies; replete as indicated  -Please obtain records from [facility] ENT  -Further recommendations pending review of MBSS and esophagram  -GI will continue to follow    Dixon Liriano DO  Gastroenterology Fellow  Pager: [phone]

## 2023-04-14 NOTE — CONSULT NOTE ADULT - SUBJECTIVE AND OBJECTIVE BOX
69yo M who presented to the ED with progressive, generalized weakness, body pain, and oropharyngeal dysphagiax 6 months with a gradual 50lb weight loss.   GI consulted concerning dysphagia.    The patient notes he saw an ENT doctor at [facility] for his sx who performed a laryngoscopy and saw 'green fluid'.  He was treated with abx which temporarily resolved sx, though they returned.  He denies EGD for eval.  H&P indicates he was also seen by GI though pt doesnt recall this.    Was referred to surgeon, Dr. Quiñones though referred to ED for comprehensive eval    At bedside, Mr. Mann describes being able to swallow liquids and solids, but finds solids feel stuck in back of throat upon swallowing. No discomfort after swallowing.  Denies cough or recent PNA. No food impactions.    He otherwise denies constipation/diarrhea, melena or hemtochezia. No abd pain    In the ED:  Vitals 98.6  /77 RA 95%   Labs: WBC 11.99 Hg 10.4 Plt 453 PT 13.5 INR 1.13 Na 133 K 4.4 Cl 97 Bicarb 26 AG 22 Cr 1.36   TSH 12.64   Imaging: CTAP  1.  Mild distention of the esophagus with layering fluid, with multifocal   patchy consolidative and groundglass opacities bilaterally, most likely   representing aspiration pneumonia.  2.  Partially visualized indeterminate 1.7 cm hyperdense lesion in the   right hemiscrotum, suspicious for malignancy. Recommend further   evaluation with scrotal ultrasound.  3.  Circumferential wall thickening of the urinary bladder, possibly   reflecting under distention, although correlation with urinalysis is   recommended to exclude underlying acute infectious/inflammatory cystitis.  4.  Diffuse subcutaneous edema.  Ct soft tissue   1.  Limited intracranial images demonstrate questionable 1.1 cm   rim-enhancing hypodense lesion in the posterior left parietal lobe seen   only on axial images. Differential includes malignancy/metastasis versus   less likely infection in this assumed immunocompetent patient. Recommend   further evaluation with contrast-enhanced MRI of the brain.  2.  Layering fluid within the esophagus with patchy consolidations in the   included bilateral upper lobes, most likely representing aspiration.   (14 Apr 2023 02:32)    Allergies    No Known Allergies    Intolerances      Home Medications:    MEDICATIONS:  MEDICATIONS  (STANDING):  cefTRIAXone   IVPB 2000 milliGRAM(s) IV Intermittent every 24 hours  enoxaparin Injectable 40 milliGRAM(s) SubCutaneous every 24 hours  nystatin    Suspension 263728 Unit(s) Oral every 24 hours  sodium chloride 0.9%. 500 milliLiter(s) (100 mL/Hr) IV Continuous <Continuous>    MEDICATIONS  (PRN):    PAST MEDICAL & SURGICAL HISTORY:  No pertinent past medical history      No significant past surgical history        FAMILY HISTORY:  No family hx of GI malignancy    SOCIAL HISTORY:  No  no alcohol or tobacco use  works as   lives at home with wife      REVIEW OF SYSTEMS:  All other 10 review of systems is negative unless indicated above.    Vital Signs Last 24 Hrs  T(C): 36.7 (14 Apr 2023 06:34), Max: 37 (13 Apr 2023 16:28)  T(F): 98.1 (14 Apr 2023 06:34), Max: 98.6 (13 Apr 2023 16:28)  HR: 96 (14 Apr 2023 06:55) (91 - 103)  BP: 119/72 (14 Apr 2023 06:34) (115/73 - 148/92)  BP(mean): 92 (14 Apr 2023 01:48) (92 - 92)  RR: 18 (14 Apr 2023 06:55) (18 - 20)  SpO2: 98% (14 Apr 2023 06:55) (91% - 98%)    Parameters below as of 14 Apr 2023 06:55  Patient On (Oxygen Delivery Method): nasal cannula  O2 Flow (L/min): 2      04-13 @ 07:01  -  04-14 @ 07:00  --------------------------------------------------------  IN: 450 mL / OUT: 0 mL / NET: 450 mL        PHYSICAL EXAM:    General: No acute distress  Eyes: Anicteric sclerae, moist conjunctivae  HENT: Moist mucous membranes  Neck: Trachea midline, supple  Lungs: Normal respiratory effort and no intercostal retractions  Cardiovascular: RRR  Abdomen: Soft, non-tender non-distended; No rebound or guarding  Extremities: Normal range of motion, No clubbing, cyanosis or edema  Neurological: Alert and oriented x3  Skin: Warm and dry. No obvious rash    LABS:                        9.7    11.76 )-----------( 462      ( 14 Apr 2023 06:10 )             28.9     04-14    133<L>  |  98  |  22  ----------------------------<  81  4.2   |  24  |  1.42<H>    Ca    9.5      14 Apr 2023 06:10  Phos  3.9     04-14  Mg     2.1     04-14    TPro  8.0  /  Alb  2.3<L>  /  TBili  0.4  /  DBili  x   /  AST  230<H>  /  ALT  98<H>  /  AlkPhos  52  04-14        PT/INR - ( 13 Apr 2023 17:53 )   PT: 13.5 sec;   INR: 1.13          PTT - ( 13 Apr 2023 17:53 )  PTT:29.2 sec    Urinalysis with Rflx Culture (collected 13 Apr 2023 17:53)      RADIOLOGY & ADDITIONAL STUDIES:     ACC: [id number] EXAM:  CT ABDOMEN AND PELVIS IC   ORDERED BY: NELIA GRAY     ACC: [id number] EXAM:  CT CHEST IC   ORDERED BY: NELIA GRAY     PROCEDURE DATE:  04/13/2023          INTERPRETATION:  CLINICAL INFORMATION: 50 pound weight loss. Possible   aspiration. Chest/neck pain.    COMPARISON: None.    CONTRAST/COMPLICATIONS:  IV Contrast: 60 cc Isovue-370 .  Oral Contrast: NONE  Complications: None reported at time of study completion    PROCEDURE:  CT of the Chest, Abdomen and Pelvis was performed.  Sagittal and coronal reformats were performed.    FINDINGS:  CHEST:  LUNGS AND LARGE AIRWAYS: Bilateral multifocal patchy consolidative and   groundglass opacities, and dependent distribution and predominantly   bilateral lower lobes. Patent central airways.  PLEURA: No pleural effusion.  VESSELS: Mild atherosclerotic changes..  HEART: Cardiomegaly. No pericardial effusion.  MEDIASTINUM AND KEON: No lymphadenopathy.  CHEST WALL AND LOWER NECK: Bilateral symmetric gynecomastia.    ABDOMEN AND PELVIS:  LIVER: Within normal limits.  BILE DUCTS: Normal caliber.  GALLBLADDER: Within normal limits.  SPLEEN: Within normal limits.  PANCREAS: Within normal limits.  ADRENALS: Within normal limits.  KIDNEYS/URETERS: No renal stones or hydronephrosis.    BLADDER: There is circumferential wall thickening of the urinary bladder.  REPRODUCTIVE ORGANS: Partially imaged scrotum/testicles, hyperattenuating   right hemiscrotal nodule 1.7 x 1.4 cm, may represent normal testicle   enhancement or testicular mass, cannot be accurately assessed since   contralateral left hemiscrotum is not included in field-of-view. Given   concern for malignancy recommend scrotal ultrasound to exclude testicular   mass.      BOWEL: Mild distention of the proximal and mid esophagus with layering   fluid. Limited evaluation secondary to lack of enteric contrast. No bowel   obstruction.  PERITONEUM: No ascites.  VESSELS: Mild atherosclerotic changes.  RETROPERITONEUM/LYMPH NODES: No lymphadenopathy.  ABDOMINAL WALL: Diffuse subcutaneous edema.  BONES: Degenerative changes.    IMPRESSION:  1.  Mild fluid distended lower esophagus with multifocal patchy   consolidative and groundglass opacities bilaterally with dependent and   basilar distribution, most likely aspiration pneumonia.  2.   Partially imaged scrotum/testicles, hyperenhancing right hemiscrotal   nodule may represent normal testicle or testicular mass, cannot be   accurately assessed since contralateral left hemiscrotum is not included   in field-of-view. Given history of unintentional weight loss recommend   scrotal ultrasound for further evaluation to exclude mass.  3.  Anasarca.    --- End of Report ---          JOI MCDONOUGH MD; Resident Radiologist  This document has been electronically signed.  ELENA BARNEY MD; Attending Radiologist  This document has been electronically signed. Apr 14 2023  9:02AM

## 2023-04-14 NOTE — H&P ADULT - PROBLEM SELECTOR PLAN 4
patient with CT showing 1.1 cm rim-enhancing hypodense lesion in the posterior left parietal lobe seen   only on axial images.  -differential includes malignancy/metastasis --> infx not likely in patients progressive presentation  -consider heme on consult in AM   -consult neuro in AM patient with CT showing 1.1 cm rim-enhancing hypodense lesion in the posterior left parietal lobe seen   only on axial images but not on cth   -differential includes malignancy/metastasis --> infx not likely in patients progressive presentation  -consider heme on consult in AM   -consult neuro in AM patient with CT showing 1.1 cm rim-enhancing hypodense lesion in the posterior left parietal lobe seen   only on axial images but not on cth   nsgy consulted  -f/u nsgy recs  -differential includes malignancy/metastasis --> infx not likely in patients progressive presentation & appearance  -consider heme on consult in AM   -consult neuro in AM

## 2023-04-14 NOTE — CONSULT NOTE ADULT - ASSESSMENT
67yo M no known PMH (possible cardiac) here w/ c/o generalized weakness, body pain, and unable to swallow x 6 months with a gradual 50lb weight loss admitted for FTT weakness and dysphagia work up. He had US scrotal obtained on 4/14/23 that showed a 1.7 cm hypervascular solid lesion in the right testicle suspicious for neoplasm given lack of symptoms.    Recs:  -His US scrotal was reviewed and findings reviewed with the patient  -Testicular markers to be obtained in AM, AFP, bhCG and LDH  -please obtain a PVR to rule out incomplete emptying

## 2023-04-14 NOTE — H&P ADULT - PROBLEM SELECTOR PLAN 5
patient with transaminitis on admission - no known transaminitis outpatient on his labs  no RUQ tenderness at this time, no liver issues seen on CTAP   -f/u CMP in AM patient with transaminitis on admission - no known transaminitis outpatient on his labs  no RUQ tenderness at this time, no liver issues seen on CTAP   -f/u CMP in AM  -f/u RUQ u/s   -f/u hepatits panel

## 2023-04-14 NOTE — H&P ADULT - PROBLEM SELECTOR PLAN 3
patient with mild leukocytosis  possible etiology aspiration pna seen on imaging  pt on room air - no cough at this time  s/p augmentin outpatient  -ctm patient with mild leukocytosis  possible etiology aspiration pna seen on imaging  pt on room air - no cough at this time  s/p augmentin outpatient  -c/w ctx 2g

## 2023-04-14 NOTE — PROGRESS NOTE ADULT - SUBJECTIVE AND OBJECTIVE BOX
Interval events: Bedside swallow performed tolerated thin liquids pending MBSS.       SUBJECTIVE: Patient seen and examined bedside by chief resident. Patient this morning feels OK improved from prior. No nausea or vomiting. No acute complaints.    cefTRIAXone   IVPB 2000 milliGRAM(s) IV Intermittent every 24 hours  enoxaparin Injectable 40 milliGRAM(s) SubCutaneous every 24 hours  nystatin    Suspension 418251 Unit(s) Oral every 24 hours      Vital Signs Last 24 Hrs  T(C): 36.7 (2023 06:34), Max: 37 (2023 16:28)  T(F): 98.1 (2023 06:34), Max: 98.6 (2023 16:28)  HR: 96 (2023 06:55) (91 - 103)  BP: 119/72 (2023 06:34) (115/73 - 148/92)  BP(mean): 92 (2023 01:48) (92 - 92)  RR: 18 (2023 06:55) (18 - 20)  SpO2: 98% (2023 06:55) (91% - 98%)    Parameters below as of 2023 06:55  Patient On (Oxygen Delivery Method): nasal cannula  O2 Flow (L/min): 2    I&O's Detail    2023 07:01  -  2023 07:00  --------------------------------------------------------  IN:    IV PiggyBack: 50 mL    sodium chloride 0.9%: 400 mL  Total IN: 450 mL    OUT:  Total OUT: 0 mL    Total NET: 450 mL          General: NAD, resting comfortably in bed  C/V: NSR  Pulm: Nonlabored breathing, no respiratory distress  Abd: soft, appropriately NTTP/ND.  Extrem: WWP, no edema, SCDs in place        LABS:                        9.7    11.76 )-----------( 462      ( 2023 06:10 )             28.9     04-14    133<L>  |  98  |  22  ----------------------------<  81  4.2   |  24  |  1.42<H>    Ca    9.5      2023 06:10  Phos  3.9     -14  Mg     2.1     -14    TPro  8.0  /  Alb  2.3<L>  /  TBili  0.4  /  DBili  x   /  AST  230<H>  /  ALT  98<H>  /  AlkPhos  52  04-14    PT/INR - ( 2023 17:53 )   PT: 13.5 sec;   INR: 1.13          PTT - ( 2023 17:53 )  PTT:29.2 sec  Urinalysis Basic - ( 2023 17:53 )    Color: Yellow / Appearance: Clear / S.020 / pH: x  Gluc: x / Ketone: NEGATIVE  / Bili: Negative / Urobili: 0.2 E.U./dL   Blood: x / Protein: 100 mg/dL / Nitrite: NEGATIVE   Leuk Esterase: NEGATIVE / RBC: < 5 /HPF / WBC < 5 /HPF   Sq Epi: x / Non Sq Epi: x / Bacteria: Present /HPF        RADIOLOGY & ADDITIONAL STUDIES:

## 2023-04-14 NOTE — PROGRESS NOTE ADULT - ASSESSMENT
69yo M no known PMH (possible cardiac) here w/ c/o generalized weakness, body pain, and unable to swallow x 6 months with a gradual 50lb weight loss admitted for FTT weakness and dysphagia work up.

## 2023-04-14 NOTE — H&P ADULT - PROBLEM SELECTOR PLAN 6
patient presents with an areli considering no hx of ckd  likely hypovolemic as pt with poor PO intake  -f/u urine lytes  -caution with fluids unknown EF patient presents with an areli considering no hx of ckd  likely hypovolemic as pt with poor PO intake  -f/u urine lytes  -trial 500 cc bolus

## 2023-04-14 NOTE — CONSULT NOTE ADULT - ATTENDING COMMENTS
Patient seen and d/w Dr. Liriano. Agree with plan above.  68M w/ progressive weight loss and oropharyngeal dysphagia. GI consulted for evaluation of dysphagia. Patient is a poor historian. Unable to elaborate hx but reports discomfort and cough shortly after eating (in seconds). Will continue to monitor symptoms. Followup modified barium swallow.

## 2023-04-14 NOTE — PROGRESS NOTE ADULT - ATTENDING COMMENTS
Patient with above PMH and course. This morning, reports feeling imporve,d denies complaints  Exam as above    Labs, imaging reviewed    FTT  Dysphagia  Testicular mass  Brain lesion    Patient with progressive symptoms over the last 6 months. Possible underlying malignancy    Follow-up MRI Brain, C-spine. Appreciate SLP, GI, follow-up barium swallow.  Appreciate NSG  Get scrotal US, Urology evaluation.

## 2023-04-14 NOTE — CONSULT NOTE ADULT - SUBJECTIVE AND OBJECTIVE BOX
Patient is a 68y old  Male who presents with a chief complaint of ftt (2023 02:32)      HPI:  69yo M here w/ c/o generalized weakness, body pain, and unable to swallow x 6 months with a gradual 50lb weight loss. All of his issues started 1 year ago per pt he started to have lower extremity swelling, was started on lasix and subsequently lost to f/u so stoppe dhis lasix and then started to have facial swelling 4 months ago. He said he had a sonogram of his heart and was told something was abnormal but does not remember what.  After that he had difficulty swallowing solid foods which has progressed to sometimes thin foods as well. He saw his gastroenterologist Dr Meier who told him he was having aspiration but he never had imaging or work up. He took antibiotics for 'aspiration' but said that they did not help because he never had much of a cough or fever.  He was sent to Dr. Quiñones today but at [facility] recommended to come to the ED for inpatient workup. He has been having falls about 3 for the past few months. States had spine imaging that he did not bring of his thoracic spine that was totally normal, weakness attributed to dehydation/weight loss not spinal. Pt denies any fever, chills, n/v, diarrhea/ constipation.      In the ED:  Vitals 98.6  /77 RA 95%   Labs: WBC 11.99 Hg 10.4 Plt 453 PT 13.5 INR 1.13 Na 133 K 4.4 Cl 97 Bicarb 26 AG 22 Cr 1.36   TSH 12.64   Imaging: CTAP  1.  Mild distention of the esophagus with layering fluid, with multifocal   patchy consolidative and groundglass opacities bilaterally, most likely   representing aspiration pneumonia.  2.  Partially visualized indeterminate 1.7 cm hyperdense lesion in the   right hemiscrotum, suspicious for malignancy. Recommend further   evaluation with scrotal ultrasound.  3.  Circumferential wall thickening of the urinary bladder, possibly   reflecting under distention, although correlation with urinalysis is   recommended to exclude underlying acute infectious/inflammatory cystitis.  4.  Diffuse subcutaneous edema.  Ct soft tissue   1.  Limited intracranial images demonstrate questionable 1.1 cm   rim-enhancing hypodense lesion in the posterior left parietal lobe seen   only on axial images. Differential includes malignancy/metastasis versus   less likely infection in this assumed immunocompetent patient. Recommend   further evaluation with contrast-enhanced MRI of the brain.  2.  Layering fluid within the esophagus with patchy consolidations in the   included bilateral upper lobes, most likely representing aspiration.   (2023 02:32)    PAST MEDICAL & SURGICAL HISTORY:  No pertinent past medical history      No significant past surgical history        MEDICATIONS  (STANDING):  cefTRIAXone   IVPB 2000 milliGRAM(s) IV Intermittent every 24 hours  enoxaparin Injectable 40 milliGRAM(s) SubCutaneous every 24 hours  nystatin    Suspension 653058 Unit(s) Oral every 24 hours  sodium chloride 0.9%. 500 milliLiter(s) (100 mL/Hr) IV Continuous <Continuous>    MEDICATIONS  (PRN):            FAMILY HISTORY:      CBC Full  -  ( 2023 06:10 )  WBC Count : 11.76 K/uL  RBC Count : 4.16 M/uL  Hemoglobin : 9.7 g/dL  Hematocrit : 28.9 %  Platelet Count - Automated : 462 K/uL  Mean Cell Volume : 69.5 fl  Mean Cell Hemoglobin : 23.3 pg  Mean Cell Hemoglobin Concentration : 33.6 gm/dL  Auto Neutrophil # : 10.07 K/uL  Auto Lymphocyte # : 0.73 K/uL  Auto Monocyte # : 0.66 K/uL  Auto Eosinophil # : 0.09 K/uL  Auto Basophil # : 0.08 K/uL  Auto Neutrophil % : 85.6 %  Auto Lymphocyte % : 6.2 %  Auto Monocyte % : 5.6 %  Auto Eosinophil % : 0.8 %  Auto Basophil % : 0.7 %          x   |  x   |  x   ----------------------------<  x   4.4   |  x   |  x     Ca    9.4      2023 17:53  Phos  3.9     -14  Mg     2.1     -14    TPro  8.1  /  Alb  2.5<L>  /  TBili  0.4  /  DBili  0.2  /  AST  241<H>  /  ALT  102<H>  /  AlkPhos  56  -      Urinalysis Basic - ( 2023 17:53 )    Color: Yellow / Appearance: Clear / S.020 / pH: x  Gluc: x / Ketone: NEGATIVE  / Bili: Negative / Urobili: 0.2 E.U./dL   Blood: x / Protein: 100 mg/dL / Nitrite: NEGATIVE   Leuk Esterase: NEGATIVE / RBC: < 5 /HPF / WBC < 5 /HPF   Sq Epi: x / Non Sq Epi: x / Bacteria: Present /HPF        Radiology :     < from: CT Head No Cont (23 @ 22:48) >  ACC: [id number] EXAM:  CT BRAIN   ORDERED BY: NELIA GRAY     PROCEDURE DATE:  2023    ******PRELIMINARY REPORT******      ******PRELIMINARY REPORT******           INTERPRETATION:  INDICATIONS: Weakness. Evaluate for mass.    TECHNIQUE: Serial axial images were obtained from the skull base to the   vertex without the use of intravenous contrast. Sagittal and coronal   images were reformatted.    COMPARISON EXAMINATION: None.    FINDINGS:  VENTRICLES AND SULCI: Parenchymal volume is consistent with patient age.   No hydrocephalus.  INTRA-AXIAL: No intracranial mass effect, acute hemorrhage or acute   transcortical infarct is seen.  EXTRA-AXIAL: No fluid collection is present.  VISUALIZED SINUSES: No air-fluid levels are identified.  VISUALIZED MASTOIDS: Clear.  CALVARIUM: No acute calvarial fracture.    IMPRESSION:  No acute intracranial findings.      < end of copied text >          REVIEW OF SYSTEMS:      CONSTITUTIONAL: No fever, weight loss, or fatigue  EYES: No eye pain, visual disturbances, or discharge  ENMT:  No difficulty hearing, tinnitus, vertigo; No sinus or throat pain  NECK: No pain or stiffness  BREASTS: No pain, masses, or nipple discharge  RESPIRATORY: No cough, wheezing, chills or hemoptysis; No shortness of breath  CARDIOVASCULAR: No chest pain, palpitations, dizziness, or leg swelling  GASTROINTESTINAL: No abdominal or epigastric pain. No nausea, vomiting, or hematemesis; No diarrhea or constipation. No melena or hematochezia.  < from: CT Chest w/ IV Cont (23 @ 20:14) >    ACC: [id number] EXAM:  CT ABDOMEN AND PELVIS IC   ORDERED BY: NELIA GRAY     ACC: [id number] EXAM:  CT CHEST IC   ORDERED BY: NELIA GRAY     PROCEDURE DATE:  2023    ******PRELIMINARY REPORT******      ******PRELIMINARY REPORT******           INTERPRETATION:  CLINICAL INFORMATION: 50 pound weight loss. Possible   aspiration. Chest/neck pain.    COMPARISON: None.    CONTRAST/COMPLICATIONS:  IV Contrast: IVcontrast documented in unlinked concurrent exam  60 cc   Isovue-370 discarded.  Oral Contrast: NONE  Complications: None reported at time of study completion    PROCEDURE:  CT of the Chest, Abdomen and Pelvis was performed.  Sagittal and coronal reformats were performed.    FINDINGS:  CHEST:  LUNGS AND LARGE AIRWAYS: Bilateral multifocal patchy consolidative and   groundglass opacities, most notably in the bilateral lower lobes. Patent   central airways.  PLEURA: No pleural effusion.  VESSELS: Mild atherosclerotic changes..  HEART: Cardiomegaly. No pericardial effusion.  MEDIASTINUM AND KEON: No lymphadenopathy.  CHEST WALL AND LOWER NECK: Within normal limits.    ABDOMEN AND PELVIS:  LIVER: Within normal limits.  BILE DUCTS: Normal caliber.  GALLBLADDER: Within normal limits.  SPLEEN: Within normal limits.  PANCREAS: Within normal limits.  ADRENALS: Within normal limits.  KIDNEYS/URETERS: No renal stones or hydronephrosis.    BLADDER: There is circumferential wall thickening of the urinary bladder.  REPRODUCTIVE ORGANS: Prostate within normal limits. Partially visualized   1.7 x 1.4 cm hyperdense lesion in the right hemiscrotum (93 HU).    BOWEL: Mild distention of the proximal and mid esophagus with layering   fluid. Limited evaluation secondary to lack of enteric contrast. No bowel   obstruction.  PERITONEUM: No ascites.  VESSELS: Mild atherosclerotic changes.  RETROPERITONEUM/LYMPH NODES: No lymphadenopathy.  ABDOMINAL WALL: Diffuse subcutaneous edema.  BONES: Degenerative changes.    IMPRESSION:  1.  Mild distention of the esophagus with layering fluid, with multifocal   patchy consolidative and groundglass opacities bilaterally, most likely   representing aspiration pneumonia.  2.  Partially visualized indeterminate 1.7 cm hyperdense lesion in the   right hemiscrotum, suspicious for malignancy. Recommend further   evaluation with scrotal ultrasound.  3.  Circumferential wall thickening of the urinary bladder, possibly   reflecting under distention, although correlation with urinalysis is   recommended to exclude underlying acute infectious/inflammatory cystitis.  4.  Diffuse subcutaneous edema.    < end of copied text >      GENITOURINARY: No dysuria, frequency, hematuria, or incontinence  NEUROLOGICAL: No headaches, memory loss, loss of strength, numbness, or tremors  SKIN: No itching, burning, rashes, or lesions   LYMPH NODES: No enlarged glands  ENDOCRINE: No heat or cold intolerance; No hair loss  MUSCULOSKELETAL: No joint pain or swelling; No muscle, back, or extremity pain  PSYCHIATRIC: No depression, anxiety, mood swings, or difficulty sleeping  HEME/LYMPH: No easy bruising, or bleeding gums  ALLERGY AND IMMUNOLOGIC: No hives or eczema  VASCULAR: no swelling , erythema          Vital Signs Last 24 Hrs  T(C): 36.7 (2023 06:34), Max: 37 (2023 16:28)  T(F): 98.1 (2023 06:34), Max: 98.6 (2023 16:28)  HR: 96 (2023 06:55) (91 - 103)  BP: 119/72 (2023 06:34) (115/73 - 148/92)  BP(mean): 92 (2023 01:48) (92 - 92)  RR: 18 (2023 06:55) (18 - 20)  SpO2: 98% (2023 06:55) (91% - 98%)    Parameters below as of 2023 06:55  Patient On (Oxygen Delivery Method): nasal cannula  O2 Flow (L/min): 2          Physical Exam : thin frail, 69 yo man lying comfortably in semi Mccarty's position , awake , alert , no acute complaints , feels tired     Head : normocephalic , atraumatic    Eyes : PERRLA , EOMI , no nystagmus , sclera anicteric    ENT : neg nasal discharge , uvula midline , no oropharyngeal erythema / exudate    Neck : supple , negative JVD , negative carotid bruits , no thyromegaly    Chest : CTA bilaterally , neg wheeze / rhonchi / rales / crackles / egophany    Cardiovascular : regular rate and rhythm , neg murmurs / rubs / gallops    Abdomen : soft , non distended , non tender to palpation in all 4 quadrants ,  negative rebound / guarding , normal bowel sounds     Extremities : UE/LE NP edema     Neurologic Exam :    Alert and oriented  x  3     Motor Exam:          Right UE:               no focal weakness ,  > 3+/5 throughout                              Left UE:                 no focal weakness ,  > 3+/5 throughout          Right LE:                no focal weakness ,  > 3+/5 throughout        Left LE:                  no focal weakness ,  > 3+/5 throughout         Sensation :         intact to light touch x 4 extremities       DTR :                     biceps/brachioradialis : equal                              patella/ankle : equal      Gait :  not tested      PM&R Impression :     1) deconditioned    2) no focal weakness         Recommendations / Plan :      1) Physical / Occupational therapy focusing on therapeutic exercises , equipment evaluation , bed mobility/transfer out of bed evaluation , progressive ambulation with assistive devices prn .    2) Current disposition plan recommendation  :    pending functional progress

## 2023-04-14 NOTE — CONSULT NOTE ADULT - CONSULT REASON
B/L UE weakness, CT neck showing rim-enhancing lesion of brain B/L UE and LE weakness, neck pain, CT neck showing lesion of brain

## 2023-04-14 NOTE — SWALLOW VFSS/MBS ASSESSMENT ADULT - DIAGNOSTIC IMPRESSIONS
The pt presents with moderate oropharyngeal dysphagia primarily marked by reduced bolus manipulation and mastication, severely reduced pharyngeal pressure generation leading to moderate pharyngeal residue which improved to mild-moderate with multiple dry swallows. Laryngeal penetration noted with thin and mildly thick liquids and volitional cough unsuccessful in clearing penetrated material from airway. No aspiration observed across study, however, pt remains at risk for aspiration d/t reduced ability to protect airway. Dysphagia likely chronic/progressive with unclear etiology, suspect neurogenic. Recommend full thin liquid diet with aspiration precautions if in line with pt goals of care. Pt likely unable to meet nutritional needs given significant weight loss. Consider RD referral to optimize nutritional intake. SLP to follow. The pt presents with moderate oropharyngeal dysphagia primarily marked by reduced bolus manipulation and mastication, severely reduced pharyngeal pressure generation leading to moderate pharyngeal residue which improved to mild-moderate with multiple dry swallows. Laryngeal penetration noted with thin and mildly thick liquids and volitional cough unsuccessful in clearing penetrated material from airway. No aspiration observed across study, however, pt remains at risk for aspiration d/t reduced ability to protect airway. Dysphagia likely chronic/progressive with unclear etiology, suspect neurogenic. Recommend full thin liquid diet with aspiration precautions if in line with pt goals of care. Pt likely unable to meet nutritional needs given significant weight loss. Consider RD consult to optimize nutritional intake. SLP to follow. The pt presents with moderate oropharyngeal dysphagia primarily marked by reduced bolus manipulation and mastication, severely reduced pharyngeal pressure generation leading to moderate pharyngeal residue which improved to mild-moderate with multiple dry swallows. Laryngeal penetration noted with thin and mildly thick liquids and volitional cough unsuccessful in clearing penetrated material from airway. No aspiration observed across study, however, pt remains at risk for aspiration d/t reduced ability to protect airway (i.e., reduced laryngeal vestibule closure.) Incidental finding of nonobstructive cricopharyngeal bar. Dysphagia likely chronic/progressive with unclear etiology, suspect neurogenic. Recommend full thin liquid diet with aspiration precautions if in line with pt goals of care. Pt likely unable to meet nutritional needs given significant weight loss. Consider RD consult to optimize nutritional intake. SLP to follow.

## 2023-04-14 NOTE — SWALLOW VFSS/MBS ASSESSMENT ADULT - ORAL PHASE COMMENTS
Oral phase characterized by minimal anterior bolus loss, prolonged bolus preparation/mastication with puree and regular solids, slowed anterior-posterior transit with puree and regular solids, mild-moderate oral residue

## 2023-04-14 NOTE — SWALLOW VFSS/MBS ASSESSMENT ADULT - ROSENBEK'S PENETRATION ASPIRATION SCALE
thin liquids and mildly thick liquids/(3) contrast remains above the vocal cords, visible residue remains (penetration)

## 2023-04-14 NOTE — CONSULT NOTE ADULT - ATTENDING COMMENTS
The pt was seen and examined with Resident and PA. I agree with the note as above. Overall the picture is confusing and concerning.  His labs, imaging and other notes were reviewed.    On exam his right testicle has a SOFT RLP mass. Non-tender. Not enlarged.    The most common MALIGNANT testicular mass in patients >60 is lymphoma. However would also r/o TB. Taken with brain mass and neuro symptoms, I am not sure what this process is and if it is all part of the same process. Will continue to follow with you.    Total time reviewing imaging, notes, labs, seeing patient, MDM: 60mins

## 2023-04-14 NOTE — H&P ADULT - PROBLEM SELECTOR PLAN 2
patient with months of dysphagia - started with solid foods now progressively getting worse  has seen GI who stated he was aspirating but has had no further work up since  CT soft tissue showing Layering fluid within the esophagus with patchy consolidations in the included bilateral upper lobes, most likely representing aspiration.  has thickening of skin & difficulty raising arms over shoulders as well as worsening of sx with cold foods and environment, possibly rheum etiology   -consult gi in am  -scleroderma rheum labs  -s+s eval  -npo for now patient with months of dysphagia - started with solid foods now progressively getting worse  has seen GI who stated he was aspirating but has had no further work up since  CT soft tissue showing Layering fluid within the esophagus with patchy consolidations in the included bilateral upper lobes, most likely representing aspiration.  has thickening of skin & difficulty raising arms over shoulders as well as worsening of sx with cold foods and environment, possibly rheum etiology   -consult gi in am  -scleroderma rheum labs  -s+s eval  -npo for now  -c/w ctx 2g

## 2023-04-14 NOTE — CONSULT NOTE ADULT - ASSESSMENT
{\rtf1\wutgvh82728\ansi\fefspcw2815\ftnbj\uc1\deff0  {\fonttbl{\f0 \fnil Segoe UI;}{\f1 \fnil \fcharset0 Segoe UI;}{\f2 \fnil Times New Nino;}}  {\colortbl ;\gft048\fldwr204\kpwz021 ;\red0\green0\blue0 ;\red0\green0\jhik425 ;\red0\green0\blue0 ;}  {\stylesheet{\f0\fs20 Normal;}{\cs1 Default Paragraph Font;}{\cs2\f0\fs16 Line Number;}{\cs3\f2\fs24\ul\cf3 Hyperlink;}}  {\*\revtbl{Unknown;}}  \pyqsbz17753\aipfdi21749\fsdcn2818\eigmq8379\prcqu6167\pcuwc1879\lwrglbl782\efhystt513\nogrowautofit\awjvha210\formshade\nofeaturethrottle1\dntblnsbdb\fet4\aendnotes\aftnnrlc\pgbrdrhead\pgbrdrfoot  \sectd\qwahcy93061\tivfsj76387\guttersxn0\trfexcyw7403\olbnrlif8254\spxwlwdg4060\lxhwgcaj1994\tbeulrz647\ronceaf441\sbkpage\pgncont\pgndec  \plain\plain\f0\fs24\ql\plain\f0\fs24\plain\f0\fs20\mmwu8656\hich\f0\dbch\f0\loch\f0\fs20 IM\par  \par  68 y o M here w/ c/o generalized weakness, body pain, and unable to swallow x 6 months with a gradual 50lb weight loss admitted for FTT weakness and dysphagia work up. \par  \par  \plain\f1\fs20\skad5275\hich\f1\dbch\f1\loch\f1\cf2\fs20\ul{\field{\*\fldinst HYPERLINK [id number] }{\fldrslt Problem/Plan - 1:}}\plain\f0\fs20\egpc5340\hich\f0\dbch\f0\loch\f0\fs20\ql\par  \'b7  {\*\bkmkstart hg41556372652}{\*\bkmkend la99578407057}Problem: {\*\bkmkstart vv38167598616}{\*\bkmkend ba68380253906}Weakness. \par  \'b7  {\*\bkmkstart gi64196309598}{\*\bkmkend tz08197292957}Plan: {\*\bkmkstart ba94368880080}{\*\bkmkend kz61365468830}patient presents with ongoing weakness for many months\par  no precipitating factor\par  had multiple falls over the course of a few months\par  does not use any devices at home to ambulate\par  TSH elevated on admission, however T4 normal\par  has neck stiffness & has been using a c collar at home for unclear reason\par  -f/u mr brain, mr c spine\par  -f/u nsgy recs \par  -f/u PT recs\par  -consult neuro in AM\par  -attain outside mri records\par  -f/u b12 folate\par  -possible malignancy seen on imaging with brain lesion & testicular lesion -> consider heme onc\par  -f/u gen surg recs\par  -f/u hiv testing.\par  \par  \plain\f1\fs20\nwzt1847\hich\f1\dbch\f1\loch\f1\cf2\fs20\ul{\field{\*\fldinst HYPERLINK [id number] }{\fldrslt Problem/Plan - 2:}}\plain\f0\fs20\aiqw1729\hich\f0\dbch\f0\loch\f0\fs20\ql\par  \'b7  {\*\bkmkstart mv17254189557}{\*\bkmkend tw97167793310}Problem: {\*\bkmkstart dg11364486755}{\*\bkmkend vj79920157146}Dysphagia. \par  \'b7  {\*\bkmkstart kh37488285996}{\*\bkmkend ig38757307696}Plan: {\*\bkmkstart hs94745851111}{\*\bkmkend jl12480479996}patient with months of dysphagia - started with solid foods now progressively getting worse\par  has seen GI who stated he was aspirating but has had no further work up since\par  CT soft tissue showing Layering fluid within the esophagus with patchy consolidations in the included bilateral upper lobes, most likely representing aspiration.\par  has thickening of skin & difficulty raising arms over shoulders as well as worsening of sx with cold foods and environment, possibly rheum etiology \par  -consult gi in am\par  -scleroderma rheum labs\par  -s+s eval\par  -npo for now\par  -c/w ctx 2g.\par  \par  \plain\f1\fs20\hjfr4339\hich\f1\dbch\f1\loch\f1\cf2\fs20\ul{\field{\*\fldinst HYPERLINK [id number] }{\fldrslt Problem/Plan - 3:}}\plain\f0\fs20\tsrq2998\hich\f0\dbch\f0\loch\f0\fs20\ql\par  \'b7  {\*\bkmkstart mt37566853214}{\*\bkmkend mm88927007144}Problem: {\*\bkmkstart wi75640197711}{\*\bkmkend ru89662180740}Leukocytosis. \par  \'b7  {\*\bkmkstart nh48578020267}{\*\bkmkend jl95038817278}Plan: {\*\bkmkstart hm92454308368}{\*\bkmkend ca20945522900}patient with mild leukocytosis\par  possible etiology aspiration pna seen on imaging\par  pt on room air - no cough at this time\par  s/p augmentin outpatient\par  -c/w ctx 2g.\par  \par  \plain\f1\fs20\uhin6815\hich\f1\dbch\f1\loch\f1\cf2\fs20\ul{\field{\*\fldinst HYPERLINK [id number] }{\fldrslt Problem/Plan - 4:}}\plain\f0\fs20\odfc1956\hich\f0\dbch\f0\loch\f0\fs20\ql\par  \'b7  {\*\bkmkstart fv82855768985}{\*\bkmkend sx53761358445}Problem: {\*\bkmkstart qf82484794891}{\*\bkmkend vu20806145995}Brain lesion. \par  \'b7  {\*\bkmkstart ne88004985231}{\*\bkmkend yf20178497061}Plan: {\*\bkmkstart rn29299456277}{\*\bkmkend ws69836717002}patient with CT showing 1.1 cm rim-enhancing hypodense lesion in the posterior left parietal lobe seen \par  only on axial images but not on cth \par  nsgy consulted\par  -f/u nsgy recs\par  -differential includes malignancy/metastasis --> infx not likely in patients progressive presentation & appearance\par  -consider heme on consult in AM \par  -consult neuro in AM.\par  \par  \plain\f1\fs20\mgbi0847\hich\f1\dbch\f1\loch\f1\cf2\fs20\ul{\field{\*\fldinst HYPERLINK [id number] }{\fldrslt Problem/Plan - 5:}}\plain\f0\fs20\haqd7327\hich\f0\dbch\f0\loch\f0\fs20\ql\par  \'b7  {\*\bkmkstart lm11846104751}{\*\bkmkend lv38183179447}Problem: {\*\bkmkstart lb46811571397}{\*\bkmkend df01373338706}Transaminitis. \par  \'b7  {\*\bkmkstart os36683124472}{\*\bkmkend ou90634052479}Plan: {\*\bkmkstart st84511124253}{\*\bkmkend vy17681896654}patient with transaminitis on admission - no known transaminitis outpatient on his labs\par  no RUQ tenderness at this time, no liver issues seen on CTAP \par  -f/u CMP in AM\par  -f/u RUQ u/s \par  -f/u hepatits panel.\par  \par  \plain\f1\fs20\ijfr2379\hich\f1\dbch\f1\loch\f1\cf2\fs20\ul{\field{\*\fldinst HYPERLINK [id number] }{\fldrslt Problem/Plan - 6:}}\plain\f0\fs20\nxnw8816\hich\f0\dbch\f0\loch\f0\fs20\ql\par  \'b7  {\*\bkmkstart nv30604469829}{\*\bkmkend vw21865121985}Problem: {\*\bkmkstart tm10198019547}{\*\bkmkend gr35508896475}OLGA (acute kidney injury). \par  \'b7  {\*\bkmkstart tt92878246511}{\*\bkmkend cd50428685074}Plan: {\*\bkmkstart bx48369529685}{\*\bkmkend dy81148955833}patient presents with an olga considering no hx of ckd\par  likely hypovolemic as pt with poor PO intake\par  -f/u urine lytes\par  -trial 500 cc bolus.\par  \par  \plain\f1\fs20\slyh5824\hich\f1\dbch\f1\loch\f1\cf2\fs20\ul{\field{\*\fldinst HYPERLINK [id number] }{\fldrslt Problem/Plan - 7:}}\plain\f0\fs20\hpup6478\hich\f0\dbch\f0\loch\f0\fs20\ql\par  \'b7  {\*\bkmkstart eb94718044305}{\*\bkmkend la71985133243}Problem: {\*\bkmkstart ay74786966452}{\*\bkmkend ta28435224604}Testicular mass. \par  \'b7  {\*\bkmkstart pj16490944310}{\*\bkmkend wg68224332422}Plan: {\*\bkmkstart qn25149825725}{\*\bkmkend fu15743850575}patient with testicular mass seen on imaging\par  had not noticed it himself\par  no urinary sx and this time\par  -consult uro in am\par  -testicular ultrasound\par  -consult heme onc.\par  \par  \plain\f1\fs20\pksf3688\hich\f1\dbch\f1\loch\f1\cf2\fs20\ul{\field{\*\fldinst HYPERLINK [id number] }{\fldrslt Problem/Plan - 8:}}\plain\f0\fs20\erea0010\hich\f0\dbch\f0\loch\f0\fs20\ql\par  \'b7  {\*\bkmkstart rn66340778554}{\*\bkmkend zi73019616362}Problem: {\*\bkmkstart ww82874684554}{\*\bkmkend kc19232362825}Pneumonia, aspiration. \par  \'b7  {\*\bkmkstart bt63015177372}{\*\bkmkend tw40267292435}Plan: {\*\bkmkstart ue33283635191}{\*\bkmkend gx18538767854}patient with b/l GGO seen on ctscan\par  consistent with likely aspiration pna\par  s/p abx outpatient\par  at this time will keep pt npo\par  -S+S eval\par  -ctm oxygen requirements\par  -c/w ctx 2g.\par  \par  \plain\f1\fs20\beeo4289\hich\f1\dbch\f1\loch\f1\cf2\fs20\ul{\field{\*\fldinst HYPERLINK [id number] }{\fldrslt Problem/Plan - 9:}}\plain\f0\fs20\apdt5612\hich\f0\dbch\f0\loch\f0\fs20\ql\par  \'b7  {\*\bkmkstart kn79501587091}{\*\bkmkend lw36332188739}Problem: {\*\bkmkstart tg09528565321}{\*\bkmkend uw98080658498}Lower extremity edema. \par  \'b7  {\*\bkmkstart gb47008783340}{\*\bkmkend lu15573338208}Plan: {\*\bkmkstart mt58321868332}{\*\bkmkend zy91355271515}pt with hx of progressive edema in b/l upper lower extremities and facial area\par  was on hctz which he said helped but he stopped seeing pcp\par  does not remember what tte shows but states its normal\par  -f/u tte\par  -f/u b/l le dopplers\par  -f/u BNP\par  - consider albumin.\plain\f1\fs20\wrqf0849\hich\f1\dbch\f1\loch\f1\cf2\fs20\strike\plain\f0\fs20\kvve4417\hich\f0\dbch\f0\loch\f0\fs20\par  \par  \plain\f1\fs20\bxgi2698\hich\f1\dbch\f1\loch\f1\cf2\fs20\ul{\field{\*\fldinst HYPERLINK [id number] }{\fldrslt Problem/Plan - 10:}}\plain\f0\fs20\wkll5656\hich\f0\dbch\f0\loch\f0\fs20\ql\par  \'b7  {\*\bkmkstart nz46315342979}{\*\bkmkend tx49517548405}Problem: {\*\bkmkstart iu95920209867}{\*\bkmkend yc34967611348}Prophylactic measure. \par  \'b7  {\*\bkmkstart sd67418455726}{\*\bkmkend df57297112574}Plan; {\*\bkmkstart wm51763311591}{\*\bkmkend by62660077525}F: none\par  E: replete as needed\par  N: npo\par  DVT ppx: lovenox.\par  \par  }

## 2023-04-15 LAB
AFP-TM SERPL-MCNC: <1.8 NG/ML — SIGNIFICANT CHANGE UP
ALBUMIN SERPL ELPH-MCNC: 2.2 G/DL — LOW (ref 3.3–5)
ALBUMIN SERPL ELPH-MCNC: 2.4 G/DL — LOW (ref 3.3–5)
ALP SERPL-CCNC: 51 U/L — SIGNIFICANT CHANGE UP (ref 40–120)
ALP SERPL-CCNC: 54 U/L — SIGNIFICANT CHANGE UP (ref 40–120)
ALT FLD-CCNC: 95 U/L — HIGH (ref 10–45)
ALT FLD-CCNC: 97 U/L — HIGH (ref 10–45)
ANION GAP SERPL CALC-SCNC: 12 MMOL/L — SIGNIFICANT CHANGE UP (ref 5–17)
ANION GAP SERPL CALC-SCNC: 9 MMOL/L — SIGNIFICANT CHANGE UP (ref 5–17)
AST SERPL-CCNC: 210 U/L — HIGH (ref 10–40)
AST SERPL-CCNC: 211 U/L — HIGH (ref 10–40)
BILIRUB DIRECT SERPL-MCNC: <0.2 MG/DL — SIGNIFICANT CHANGE UP (ref 0–0.3)
BILIRUB INDIRECT FLD-MCNC: SIGNIFICANT CHANGE UP MG/DL (ref 0.2–1)
BILIRUB SERPL-MCNC: 0.3 MG/DL — SIGNIFICANT CHANGE UP (ref 0.2–1.2)
BILIRUB SERPL-MCNC: 0.3 MG/DL — SIGNIFICANT CHANGE UP (ref 0.2–1.2)
BUN SERPL-MCNC: 24 MG/DL — HIGH (ref 7–23)
BUN SERPL-MCNC: 24 MG/DL — HIGH (ref 7–23)
CALCIUM SERPL-MCNC: 8.9 MG/DL — SIGNIFICANT CHANGE UP (ref 8.4–10.5)
CALCIUM SERPL-MCNC: 9.3 MG/DL — SIGNIFICANT CHANGE UP (ref 8.4–10.5)
CHLORIDE SERPL-SCNC: 98 MMOL/L — SIGNIFICANT CHANGE UP (ref 96–108)
CHLORIDE SERPL-SCNC: 99 MMOL/L — SIGNIFICANT CHANGE UP (ref 96–108)
CO2 SERPL-SCNC: 25 MMOL/L — SIGNIFICANT CHANGE UP (ref 22–31)
CO2 SERPL-SCNC: 28 MMOL/L — SIGNIFICANT CHANGE UP (ref 22–31)
CREAT SERPL-MCNC: 1.38 MG/DL — HIGH (ref 0.5–1.3)
CREAT SERPL-MCNC: 1.46 MG/DL — HIGH (ref 0.5–1.3)
CRP SERPL-MCNC: 53.9 MG/L — HIGH (ref 0–4)
EGFR: 52 ML/MIN/1.73M2 — LOW
EGFR: 56 ML/MIN/1.73M2 — LOW
FOLATE SERPL-MCNC: 6.4 NG/ML — SIGNIFICANT CHANGE UP
GLUCOSE BLDC GLUCOMTR-MCNC: 107 MG/DL — HIGH (ref 70–99)
GLUCOSE BLDC GLUCOMTR-MCNC: 118 MG/DL — HIGH (ref 70–99)
GLUCOSE BLDC GLUCOMTR-MCNC: 132 MG/DL — HIGH (ref 70–99)
GLUCOSE BLDC GLUCOMTR-MCNC: 62 MG/DL — LOW (ref 70–99)
GLUCOSE BLDC GLUCOMTR-MCNC: 73 MG/DL — SIGNIFICANT CHANGE UP (ref 70–99)
GLUCOSE SERPL-MCNC: 73 MG/DL — SIGNIFICANT CHANGE UP (ref 70–99)
GLUCOSE SERPL-MCNC: 73 MG/DL — SIGNIFICANT CHANGE UP (ref 70–99)
HCG-TM SERPL-ACNC: 2 MIU/ML — HIGH
HCT VFR BLD CALC: 29.2 % — LOW (ref 39–50)
HCV AB S/CO SERPL IA: 0.22 S/CO — SIGNIFICANT CHANGE UP (ref 0–0.99)
HCV AB S/CO SERPL IA: 0.23 S/CO — SIGNIFICANT CHANGE UP (ref 0–0.99)
HCV AB SERPL-IMP: SIGNIFICANT CHANGE UP
HCV AB SERPL-IMP: SIGNIFICANT CHANGE UP
HGB BLD-MCNC: 9.8 G/DL — LOW (ref 13–17)
LDH SERPL L TO P-CCNC: 657 U/L — HIGH (ref 50–242)
LEGIONELLA AG UR QL: NEGATIVE — SIGNIFICANT CHANGE UP
MAGNESIUM SERPL-MCNC: 2.1 MG/DL — SIGNIFICANT CHANGE UP (ref 1.6–2.6)
MCHC RBC-ENTMCNC: 23.6 PG — LOW (ref 27–34)
MCHC RBC-ENTMCNC: 33.6 GM/DL — SIGNIFICANT CHANGE UP (ref 32–36)
MCV RBC AUTO: 70.2 FL — LOW (ref 80–100)
NRBC # BLD: 0 /100 WBCS — SIGNIFICANT CHANGE UP (ref 0–0)
PHOSPHATE SERPL-MCNC: 4.4 MG/DL — SIGNIFICANT CHANGE UP (ref 2.5–4.5)
PLATELET # BLD AUTO: 474 K/UL — HIGH (ref 150–400)
POTASSIUM SERPL-MCNC: 4 MMOL/L — SIGNIFICANT CHANGE UP (ref 3.5–5.3)
POTASSIUM SERPL-MCNC: 4.2 MMOL/L — SIGNIFICANT CHANGE UP (ref 3.5–5.3)
POTASSIUM SERPL-SCNC: 4 MMOL/L — SIGNIFICANT CHANGE UP (ref 3.5–5.3)
POTASSIUM SERPL-SCNC: 4.2 MMOL/L — SIGNIFICANT CHANGE UP (ref 3.5–5.3)
PROCALCITONIN SERPL-MCNC: 0.32 NG/ML — HIGH (ref 0.02–0.1)
PROT SERPL-MCNC: 7.8 G/DL — SIGNIFICANT CHANGE UP (ref 6–8.3)
PROT SERPL-MCNC: 8.2 G/DL — SIGNIFICANT CHANGE UP (ref 6–8.3)
RBC # BLD: 4.16 M/UL — LOW (ref 4.2–5.8)
RBC # FLD: 19.6 % — HIGH (ref 10.3–14.5)
S PNEUM AG UR QL: NEGATIVE — SIGNIFICANT CHANGE UP
SODIUM SERPL-SCNC: 135 MMOL/L — SIGNIFICANT CHANGE UP (ref 135–145)
SODIUM SERPL-SCNC: 136 MMOL/L — SIGNIFICANT CHANGE UP (ref 135–145)
T4 FREE SERPL-MCNC: 0.73 NG/DL — LOW (ref 0.93–1.7)
WBC # BLD: 11.24 K/UL — HIGH (ref 3.8–10.5)
WBC # FLD AUTO: 11.24 K/UL — HIGH (ref 3.8–10.5)

## 2023-04-15 PROCEDURE — 99223 1ST HOSP IP/OBS HIGH 75: CPT

## 2023-04-15 PROCEDURE — 99233 SBSQ HOSP IP/OBS HIGH 50: CPT

## 2023-04-15 PROCEDURE — 72156 MRI NECK SPINE W/O & W/DYE: CPT | Mod: 26

## 2023-04-15 PROCEDURE — 99222 1ST HOSP IP/OBS MODERATE 55: CPT

## 2023-04-15 PROCEDURE — 70553 MRI BRAIN STEM W/O & W/DYE: CPT | Mod: 26

## 2023-04-15 RX ORDER — POLYETHYLENE GLYCOL 3350 17 G/17G
17 POWDER, FOR SOLUTION ORAL DAILY
Refills: 0 | Status: DISCONTINUED | OUTPATIENT
Start: 2023-04-15 | End: 2023-04-16

## 2023-04-15 RX ORDER — AZITHROMYCIN 500 MG/1
500 TABLET, FILM COATED ORAL DAILY
Refills: 0 | Status: COMPLETED | OUTPATIENT
Start: 2023-04-15 | End: 2023-04-17

## 2023-04-15 RX ADMIN — CEFTRIAXONE 100 MILLIGRAM(S): 500 INJECTION, POWDER, FOR SOLUTION INTRAMUSCULAR; INTRAVENOUS at 04:08

## 2023-04-15 RX ADMIN — AZITHROMYCIN 500 MILLIGRAM(S): 500 TABLET, FILM COATED ORAL at 12:55

## 2023-04-15 RX ADMIN — ENOXAPARIN SODIUM 40 MILLIGRAM(S): 100 INJECTION SUBCUTANEOUS at 19:26

## 2023-04-15 RX ADMIN — Medication 500000 UNIT(S): at 06:11

## 2023-04-15 NOTE — PROGRESS NOTE ADULT - ASSESSMENT
69yo M no known PMH (possible cardiac) here w/ c/o generalized weakness, body pain, and unable to swallow x 6 months with a gradual 50lb weight loss admitted for FTT weakness and dysphagia work up. He had US scrotal obtained on 4/14/23 that showed a 1.7 cm hypervascular solid lesion in the right testicle suspicious for neoplasm given lack of symptoms.    Recs:  -His US scrotal was reviewed and findings reviewed with the patient  -followup on AFP, bhCG and LDH  -please obtain a PVR to rule out incomplete emptying  - please consider rule out syphilis (RPR) and TB (quantiferon), also less likely but can be causes of testicular masses  -testicular mass in 68M is more likely to be lymphoma if all else is negative  - discussed with attending

## 2023-04-15 NOTE — PROGRESS NOTE ADULT - PROBLEM SELECTOR PLAN 2
Patient with months of dysphagia - started with solid foods now progressively getting worse. Has seen GI who stated he was aspirating but has had no further work up since. Also received laryngoscopy with ENT outpatient in January, which showed green fluid and pt given 14days of augmentin, pt did not complete abx course.  - CT soft tissue showing Layering fluid within the esophagus with patchy consolidations in the included bilateral upper lobes, most likely representing aspiration.  Plan:  -f/u scleroderma rheum labs  -s+s eval  [ ] per GI  planned for EGD on 4.17

## 2023-04-15 NOTE — PROGRESS NOTE ADULT - PROBLEM SELECTOR PLAN 7
Patient with testicular mass seen suspicious for malignancy on imaging, had not noticed it himself. Denies urinary sx on admission and at this time.     Discussed case with urology consult service. Primary testicular mass unlikely ; probabilistically , lymphoma more likely in man of his age ; recommended ruling out TB, syphilis

## 2023-04-15 NOTE — CONSULT NOTE ADULT - SUBJECTIVE AND OBJECTIVE BOX
LENGTH OF HOSPITAL STAY: 2d    CHIEF COMPLAINT:   Patient is a 68y old  Male who presents with a chief complaint of ftt (15 Apr 2023 17:40)      HISTORY OF PRESENTING ILLNESS:    HPI:  69yo M here w/ c/o generalized weakness, body pain, and unable to swallow x 6 months with a gradual 50lb weight loss. All of his issues started 1 year ago per pt he started to have lower extremity swelling, was started on lasix and subsequently lost to f/u so stoppe dhis lasix and then started to have facial swelling 4 months ago. He said he had a sonogram of his heart and was told something was abnormal but does not remember what.  After that he had difficulty swallowing solid foods which has progressed to sometimes thin foods as well. He saw his gastroenterologist Dr Meier who told him he was having aspiration but he never had imaging or work up. He took antibiotics for 'aspiration' but said that they did not help because he never had much of a cough or fever.  He was sent to Dr. Quiñones today [facility] ED for inpatient workup. He has been having falls about 3 for the past few months. States had spine imaging that he did not bring of his thoracic spine that was totally normal, weakness attributed to dehydation/weight loss not spinal. Pt denies any fever, chills, n/v, diarrhea/ constipation.      In the ED:  Vitals 98.6  /77 RA 95%   Labs: WBC 11.99 Hg 10.4 Plt 453 PT 13.5 INR 1.13 Na 133 K 4.4 Cl 97 Bicarb 26 AG 22 Cr 1.36   TSH 12.64   Imaging: CTAP  1.  Mild distention of the esophagus with layering fluid, with multifocal   patchy consolidative and groundglass opacities bilaterally, most likely   representing aspiration pneumonia.  2.  Partially visualized indeterminate 1.7 cm hyperdense lesion in the   right hemiscrotum, suspicious for malignancy. Recommend further   evaluation with scrotal ultrasound.  3.  Circumferential wall thickening of the urinary bladder, possibly   reflecting under distention, although correlation with urinalysis is   recommended to exclude underlying acute infectious/inflammatory cystitis.  4.  Diffuse subcutaneous edema.  Ct soft tissue   1.  Limited intracranial images demonstrate questionable 1.1 cm   rim-enhancing hypodense lesion in the posterior left parietal lobe seen   only on axial images. Differential includes malignancy/metastasis versus   less likely infection in this assumed immunocompetent patient. Recommend   further evaluation with contrast-enhanced MRI of the brain.  2.  Layering fluid within the esophagus with patchy consolidations in the   included bilateral upper lobes, most likely representing aspiration.   (14 Apr 2023 02:32)    PAST MEDICAL & SURGICAL HISTORY:  PAST MEDICAL & SURGICAL HISTORY:  No pertinent past medical history      No significant past surgical history        SOCIAL HISTORY:    ALLERGIES:  No Known Allergies    MEDICATIONS:  STANDING MEDICATIONS  azithromycin   Tablet 500 milliGRAM(s) Oral daily  cefTRIAXone   IVPB 2000 milliGRAM(s) IV Intermittent every 24 hours  enoxaparin Injectable 40 milliGRAM(s) SubCutaneous every 24 hours  nystatin    Suspension 300901 Unit(s) Oral every 24 hours  sodium chloride 0.9%. 500 milliLiter(s) IV Continuous <Continuous>    PRN MEDICATIONS    VITALS:   T(F): 97.4  HR: 89  BP: 110/70  RR: 18  SpO2: 96%    LABS:                        9.8    11.24 )-----------( 474      ( 15 Apr 2023 05:30 )             29.2     04-15    135  |  98  |  24<H>  ----------------------------<  73  4.0   |  28  |  1.46<H>    Ca    8.9      15 Apr 2023 05:30  Phos  4.4     04-15  Mg     2.1     04-15    TPro  8.2  /  Alb  2.2<L>  /  TBili  0.3  /  DBili  <0.2  /  AST  211<H>  /  ALT  97<H>  /  AlkPhos  54  04-15    Urinalysis with Rflx Culture (collected 13 Apr 2023 17:53)    RADIOLOGY:  < from: MR Cervical Spine w/wo IV Cont (04.15.23 @ 16:25) >  No cervical spinal cord compression. No mass.    Multilevel spondylosis with left asymmetric neural foraminal narrowings.   No significant spinal stenosis.    Anasarca.    < end of copied text >    < from: MR Brain Stereotactic w/wo IV Cont (04.15.23 @ 16:24) >  No intracranial mass, abnormal enhancement, nor other focal pathology.    < end of copied text >    < from: Xray Esophagram Single Contrast (04.14.23 @ 14:26) >  1. There is a prominent cricopharyngeal bar with significant vallecular   pooling of thin barium sulfate suspension.    2. The mid to distal esophagus appears hypokinetic with a lack of   coordinated primary peristalsis and some tertiary contractions with mild   dilation of the distal esophagus near the gastroesophageal junction,   consistent with nonspecific esophageal dysmotility such as   presbyesophagus. Broad differential may include early scleroderma or a   neuromuscular disorder.    < end of copied text >    < from: Xray Cinesophagram Swallow Function w/ Contrast (04.14.23 @ 13:57) >  IMPRESSION: As above. Please see speech pathology report in the patient's   chart for complete details regarding swallow function.    < end of copied text >    < from: VA Duplex Scrotum (04.14.23 @ 13:48) >  1.7 cmhypervascular solid lesion in the right testicle suspicious for   neoplasm.    < end of copied text >    < from: US Abdomen Limited (04.14.23 @ 13:39) >  1. No sonographic hepatobiliary abnormality identified.    2. Incidental note of echogenic right kidney, as can be seen in setting   of medical renal disease.    < end of copied text >    < from: US Duplex Venous Lower Ext Complete, Bilateral (04.14.23 @ 13:28) >  No evidence of deep venous thrombosis in either lower extremity.    < end of copied text >    < from: US Testicles (04.14.23 @ 13:24) >  1.7 cm hypervascular solid lesion in the right testicle suspicious for   neoplasm.    < end of copied text >    < from: CT Head No Cont (04.13.23 @ 22:48) >  No acute intracranial findings.    < end of copied text >    < from: CT Chest w/ IV Cont (04.13.23 @ 20:14) >  CHEST:  LUNGS AND LARGE AIRWAYS: Bilateral multifocal patchy consolidative and   groundglass opacities, and dependent distribution and predominantly   bilateral lower lobes.Patent central airways.  PLEURA: No pleural effusion.  VESSELS: Mild atherosclerotic changes..  HEART: Cardiomegaly. No pericardial effusion.  MEDIASTINUM AND KEON: No lymphadenopathy.  CHEST WALL AND LOWER NECK: Bilateral symmetric gynecomastia.    ABDOMEN AND PELVIS:  LIVER: Within normal limits.  BILE DUCTS: Normal caliber.  GALLBLADDER: Within normal limits.  SPLEEN: Within normal limits.  PANCREAS: Within normal limits.  ADRENALS: Within normal limits.  KIDNEYS/URETERS: No renal stones or hydronephrosis.    BLADDER: There is circumferential wall thickening of the urinary bladder.  REPRODUCTIVE ORGANS: Partially imaged scrotum/testicles, hyperattenuating   right hemiscrotal nodule 1.7 x 1.4 cm, may represent normal testicle   enhancement or testicular mass, cannot be accurately assessed since   contralateral left hemiscrotum is not included in field-of-view. Given   concern for malignancy recommend scrotal ultrasound to exclude testicular   mass.      BOWEL: Mild distention of the proximal and mid esophagus with layering   fluid. Limited evaluation secondary to lack of enteric contrast. No bowel   obstruction.  PERITONEUM: No ascites.  VESSELS: Mild atherosclerotic changes.  RETROPERITONEUM/LYMPH NODES: No lymphadenopathy.  ABDOMINAL WALL: Diffuse subcutaneous edema.  BONES: Degenerative changes.    IMPRESSION:  1.  Mild fluid distended lower esophagus with multifocal patchy   consolidative and groundglass opacities bilaterally with dependent and   basilar distribution, most likely aspiration pneumonia.  2.   Partially imaged scrotum/testicles, hyperenhancing right hemiscrotal   nodule may represent normal testicle or testicular mass, cannot be   accurately assessed since contralateral left hemiscrotum is not included   [id number]. Given history of unintentional weight loss recommend   scrotal ultrasound for further evaluation to exclude mass.  3.  Anasarca.    < end of copied text >    < from: CT Neck Soft Tissue w/ IV Cont (04.13.23 @ 20:13) >  No intracranial mass identified on final review. Focus questioned is   prominent sulcus or small ARACHNOID CYST, better seen on sagittal image   33 and coronal image 47.    No neckmass or lymphadenopathy. Fluid seen in the mid esophagus is   nonspecific.    < end of copied text >  < from: CT Neck Soft Tissue w/ IV Cont (04.13.23 @ 20:13) >  1.  Limited intracranial images demonstrate questionable 1.1 cm   rim-enhancing hypodense lesion in the posterior left parietal lobe seen   only on axial images. Differential includes malignancy/metastasis versus   less likely infection in this assumed immunocompetent patient. Recommend   further evaluation with contrast-enhanced MRI of the brain.  2.  Layering fluid within the esophagus with patchy consolidations in the   included bilateral upper lobes, most likely representing aspiration.    < end of copied text >    PHYSICAL EXAM:  Constitutional: WDWN resting comfortably in bed; NAD  Head: NC/AT  Eyes: PERRL, EOMI, anicteric sclera  ENT: no nasal discharge; uvula midline, no oropharyngeal erythema or exudates; MMM  Neck: supple; no JVD or thyromegaly  Respiratory: CTA B/L; no W/R/R, no retractions  Cardiac: +S1/S2; RRR; no M/R/G; PMI non-displaced  Gastrointestinal: soft, NT/ND; no rebound or guarding; +BSx4  Back: spine midline, no bony tenderness or step-offs; no CVAT B/L  Extremities: WWP, no clubbing or cyanosis; no peripheral edema. Capillary refill <2 sec  Musculoskeletal: NROM x4; no joint swelling, tenderness or erythema  Vascular: 2+ radial, femoral, DP/PT pulses B/L  Dermatologic: skin warm, dry and intact; no rashes, wounds, or scars  Lymphatic: no submandibular or cervical LAD  Neurologic: AAOx3; CNII-XII grossly intact; no focal deficits  Psychiatric: affect and characteristics of appearance, verbalizations, behaviors are appropriate     LENGTH OF HOSPITAL STAY: 2d    CHIEF COMPLAINT:   Patient is a 68y old  Male who presents with a chief complaint of ftt (15 Apr 2023 17:40)    HISTORY OF PRESENTING ILLNESS:   67yo M here w/ c/o generalized weakness, body pain, and unable to swallow x 6 months with a gradual 50lb weight loss. All of his issues started 1 year ago per pt he started to have lower extremity swelling, was started on lasix and subsequently lost to f/u so stoppe dhis lasix and then started to have facial swelling 4 months ago. He said he had a sonogram of his heart and was told something was abnormal but does not remember what.  After that he had difficulty swallowing solid foods which has progressed to sometimes thin foods as well. He saw his gastroenterologist Dr Meier who told him he was having aspiration but he never had imaging or work up. He took antibiotics for 'aspiration' but said that they did not help because he never had much of a cough or fever.  He was sent to Dr. Quiñones today but at appt recommended to come to the ED for inpatient workup. He has been having falls about 3 for the past few months. States had spine imaging that he did not bring of his thoracic spine that was totally normal, weakness attributed to dehydation/weight loss not spinal. Pt denies any fever, chills, n/v, diarrhea/ constipation.      In the ED:  Vitals 98.6  /77 RA 95%   Labs: WBC 11.99 Hg 10.4 Plt 453 PT 13.5 INR 1.13 Na 133 K 4.4 Cl 97 Bicarb 26 AG 22 Cr 1.36   TSH 12.64   Imaging: CTAP  1.  Mild distention of the esophagus with layering fluid, with multifocal   patchy consolidative and groundglass opacities bilaterally, most likely   representing aspiration pneumonia.  2.  Partially visualized indeterminate 1.7 cm hyperdense lesion in the   right hemiscrotum, suspicious for malignancy. Recommend further   evaluation with scrotal ultrasound.  3.  Circumferential wall thickening of the urinary bladder, possibly   reflecting under distention, although correlation with urinalysis is   recommended to exclude underlying acute infectious/inflammatory cystitis.  4.  Diffuse subcutaneous edema.  Ct soft tissue   1.  Limited intracranial images demonstrate questionable 1.1 cm   rim-enhancing hypodense lesion in the posterior left parietal lobe seen   only on axial images. Differential includes malignancy/metastasis versus   less likely infection in this assumed immunocompetent patient. Recommend   further evaluation with contrast-enhanced MRI of the brain.  2.  Layering fluid within the esophagus with patchy consolidations in the   included bilateral upper lobes, most likely representing aspiration.   (14 Apr 2023 02:32)    PAST MEDICAL & SURGICAL HISTORY:  No pertinent past medical history  No significant past surgical history    SOCIAL HISTORY:  Smokes 1 cigarette/day for 30 years  Has an older sister with uterine cancer    ALLERGIES:  No Known Allergies    MEDICATIONS:  STANDING MEDICATIONS  azithromycin   Tablet 500 milliGRAM(s) Oral daily  cefTRIAXone   IVPB 2000 milliGRAM(s) IV Intermittent every 24 hours  enoxaparin Injectable 40 milliGRAM(s) SubCutaneous every 24 hours  nystatin    Suspension 798564 Unit(s) Oral every 24 hours  sodium chloride 0.9%. 500 milliLiter(s) IV Continuous <Continuous>    PRN MEDICATIONS    VITALS:   T(F): 97.4  HR: 89  BP: 110/70  RR: 18  SpO2: 96%    LABS:                        9.8    11.24 )-----------( 474      ( 15 Apr 2023 05:30 )             29.2     04-15    135  |  98  |  24<H>  ----------------------------<  73  4.0   |  28  |  1.46<H>    Ca    8.9      15 Apr 2023 05:30  Phos  4.4     04-15  Mg     2.1     04-15    TPro  8.2  /  Alb  2.2<L>  /  TBili  0.3  /  DBili  <0.2  /  AST  211<H>  /  ALT  97<H>  /  AlkPhos  54  04-15    Urinalysis with Rflx Culture (collected 13 Apr 2023 17:53)    RADIOLOGY:  < from: MR Cervical Spine w/wo IV Cont (04.15.23 @ 16:25) >  No cervical spinal cord compression. No mass.    Multilevel spondylosis with left asymmetric neural foraminal narrowings.   No significant spinal stenosis.    Anasarca.    < end of copied text >    < from: MR Brain Stereotactic w/wo IV Cont (04.15.23 @ 16:24) >  No intracranial mass, abnormal enhancement, nor other focal pathology.    < end of copied text >    < from: Xray Esophagram Single Contrast (04.14.23 @ 14:26) >  1. There is a prominent cricopharyngeal bar with significant vallecular   pooling of thin barium sulfate suspension.    2. The mid to distal esophagus appears hypokinetic with a lack of   coordinated primary peristalsis and some tertiary contractions with mild   dilation of the distal esophagus near the gastroesophageal junction,   consistent with nonspecific esophageal dysmotility such as   presbyesophagus. Broad differential may include early scleroderma or a   neuromuscular disorder.    < end of copied text >    < from: Xray Cinesophagram Swallow Function w/ Contrast (04.14.23 @ 13:57) >  IMPRESSION: As above. Please see speech pathology report in the patient's   chart for complete details regarding swallow function.    < end of copied text >    < from: VA Duplex Scrotum (04.14.23 @ 13:48) >  1.7 cmhypervascular solid lesion in the right testicle suspicious for   neoplasm.    < end of copied text >    < from: US Abdomen Limited (04.14.23 @ 13:39) >  1. No sonographic hepatobiliary abnormality identified.    2. Incidental note of echogenic right kidney, as can be seen in setting   of medical renal disease.    < end of copied text >    < from: US Duplex Venous Lower Ext Complete, Bilateral (04.14.23 @ 13:28) >  No evidence of deep venous thrombosis in either lower extremity.    < end of copied text >    < from: US Testicles (04.14.23 @ 13:24) >  1.7 cm hypervascular solid lesion in the right testicle suspicious for   neoplasm.    < end of copied text >    < from: CT Head No Cont (04.13.23 @ 22:48) >  No acute intracranial findings.    < end of copied text >    < from: CT Chest w/ IV Cont (04.13.23 @ 20:14) >  CHEST:  LUNGS AND LARGE AIRWAYS: Bilateral multifocal patchy consolidative and   groundglass opacities, and dependent distribution and predominantly   bilateral lower lobes.Patent central airways.  PLEURA: No pleural effusion.  VESSELS: Mild atherosclerotic changes..  HEART: Cardiomegaly. No pericardial effusion.  MEDIASTINUM AND KEON: No lymphadenopathy.  CHEST WALL AND LOWER NECK: Bilateral symmetric gynecomastia.    ABDOMEN AND PELVIS:  LIVER: Within normal limits.  BILE DUCTS: Normal caliber.  GALLBLADDER: Within normal limits.  SPLEEN: Within normal limits.  PANCREAS: Within normal limits.  ADRENALS: Within normal limits.  KIDNEYS/URETERS: No renal stones or hydronephrosis.    BLADDER: There is circumferential wall thickening of the urinary bladder.  REPRODUCTIVE ORGANS: Partially imaged scrotum/testicles, hyperattenuating   right hemiscrotal nodule 1.7 x 1.4 cm, may represent normal testicle   enhancement or testicular mass, cannot be accurately assessed since   contralateral left hemiscrotum is not included in field-of-view. Given   concern for malignancy recommend scrotal ultrasound to exclude testicular   mass.      BOWEL: Mild distention of the proximal and mid esophagus with layering   fluid. Limited evaluation secondary to lack of enteric contrast. No bowel   obstruction.  PERITONEUM: No ascites.  VESSELS: Mild atherosclerotic changes.  RETROPERITONEUM/LYMPH NODES: No lymphadenopathy.  ABDOMINAL WALL: Diffuse subcutaneous edema.  BONES: Degenerative changes.    IMPRESSION:  1.  Mild fluid distended lower esophagus with multifocal patchy   consolidative and groundglass opacities bilaterally with dependent and   basilar distribution, most likely aspiration pneumonia.  2.   Partially imaged scrotum/testicles, hyperenhancing right hemiscrotal   nodule may represent normal testicle or testicular mass, cannot be   accurately assessed since contralateral left hemiscrotum is not included   [id number]. Given history of unintentional weight loss recommend   scrotal ultrasound for further evaluation to exclude mass.  3.  Anasarca.    < end of copied text >    < from: CT Neck Soft Tissue w/ IV Cont (04.13.23 @ 20:13) >  No intracranial mass identified on final review. Focus questioned is   prominent sulcus or small ARACHNOID CYST, better seen on sagittal image   33 and coronal image 47.    No neckmass or lymphadenopathy. Fluid seen in the mid esophagus is   nonspecific.    < end of copied text >  < from: CT Neck Soft Tissue w/ IV Cont (04.13.23 @ 20:13) >  1.  Limited intracranial images demonstrate questionable 1.1 cm   rim-enhancing hypodense lesion in the posterior left parietal lobe seen   only on axial images. Differential includes malignancy/metastasis versus   less likely infection in this assumed immunocompetent patient. Recommend   further evaluation with contrast-enhanced MRI of the brain.  2.  Layering fluid within the esophagus with patchy consolidations in the   included bilateral upper lobes, most likely representing aspiration.    < end of copied text >    PHYSICAL EXAM:  Constitutional: Resting comfortably in bed; NAD  Head: NC/AT  Eyes: PERRL, EOMI, anicteric sclera  Neck: supple; no JVD or thyromegaly  Respiratory: CTA B/L  Cardiac: RRR  Gastrointestinal: soft, NT/ND  Extremities: 1+ pitting edema   Lymphatic: no submandibular or cervical LAD  Neurologic: AAOx3; CNII-XII grossly intact; no focal deficits, slow speech  Psychiatric: affect and characteristics of appearance, verbalizations, behaviors are appropriate

## 2023-04-15 NOTE — PROGRESS NOTE ADULT - SUBJECTIVE AND OBJECTIVE BOX
***incomplete note Patient is a 68y old  Male who presents with a chief complaint of ftt (15 Apr 2023 18:06)    INTERVAL EVENTS:  - tolerating clear liquid diet   - underwent MRI today   - no dysuria   - mild constipation; starting on miralax qd     SUBJECTIVE:  Patient was seen and examined at bedside.    Review of systems: No CP, dyspnea, nausea or vomiting, dysuria, LE edema.     Diet, Clear Liquid (04-14-23 @ 15:26) [Active]      MEDICATIONS:  MEDICATIONS  (STANDING):  azithromycin   Tablet 500 milliGRAM(s) Oral daily  cefTRIAXone   IVPB 2000 milliGRAM(s) IV Intermittent every 24 hours  enoxaparin Injectable 40 milliGRAM(s) SubCutaneous every 24 hours  nystatin    Suspension 171205 Unit(s) Oral every 24 hours  sodium chloride 0.9%. 500 milliLiter(s) (100 mL/Hr) IV Continuous <Continuous>    MEDICATIONS  (PRN):      Allergies    No Known Allergies    Intolerances        OBJECTIVE:  Vital Signs Last 24 Hrs  T(C): 36.3 (15 Apr 2023 12:58), Max: 36.5 (14 Apr 2023 20:48)  T(F): 97.4 (15 Apr 2023 12:58), Max: 97.7 (14 Apr 2023 20:48)  HR: 89 (15 Apr 2023 12:58) (87 - 89)  BP: 110/70 (15 Apr 2023 12:58) (106/63 - 110/70)  BP(mean): --  RR: 18 (15 Apr 2023 12:58) (17 - 18)  SpO2: 96% (15 Apr 2023 12:58) (96% - 100%)    Parameters below as of 15 Apr 2023 12:58  Patient On (Oxygen Delivery Method): room air      I&O's Summary      PHYSICAL EXAM:  Gen: Reclining in bed at time of exam, appears stated age  HEENT: MMM, clear OP  Neck: trachea at midline  CV: RRR, +S1/S2  Pulm: adequate respiratory effort, no increase in work of breathing  Abd: soft, ND  Skin: warm and dry,   Ext: no LE edema   Neuro: AOx3, speaking in full sentences  Psych: affect and behavior appropriate, pleasant at time of interview    LABS:                        9.8    11.24 )-----------( 474      ( 15 Apr 2023 05:30 )             29.2     04-15    135  |  98  |  24<H>  ----------------------------<  73  4.0   |  28  |  1.46<H>    Ca    8.9      15 Apr 2023 05:30  Phos  4.4     04-15  Mg     2.1     04-15    TPro  8.2  /  Alb  2.2<L>  /  TBili  0.3  /  DBili  <0.2  /  AST  211<H>  /  ALT  97<H>  /  AlkPhos  54  04-15    LIVER FUNCTIONS - ( 15 Apr 2023 05:30 )  Alb: 2.2 g/dL / Pro: 8.2 g/dL / ALK PHOS: 54 U/L / ALT: 97 U/L / AST: 211 U/L / GGT: x             CAPILLARY BLOOD GLUCOSE      POCT Blood Glucose.: 132 mg/dL (15 Apr 2023 19:05)  POCT Blood Glucose.: 73 mg/dL (15 Apr 2023 18:58)  POCT Blood Glucose.: 62 mg/dL (15 Apr 2023 18:08)  POCT Blood Glucose.: 107 mg/dL (15 Apr 2023 12:43)  POCT Blood Glucose.: 96 mg/dL (14 Apr 2023 23:51)        MICRODATA:    Urinalysis with Rflx Culture (collected 13 Apr 2023 17:53)        RADIOLOGY/OTHER STUDIES:

## 2023-04-15 NOTE — PROGRESS NOTE ADULT - ATTENDING COMMENTS
Patient seen and d/w Dr. Liriano. Agree with plan above.  No acute event and no new complaints. Remains on NC. MBS reviewed, will plan for EGD when medically optimized to further characterize dysphagia. Risk and benefit discussed, all questions answered. Patient verbalized understanding and agreement to plan.

## 2023-04-15 NOTE — CONSULT NOTE ADULT - ASSESSMENT
69 yo M with no known PMHx presents with generalized weakness, body pain and difficulty swallowing x 6 months with 50 lb weight loss, admitted for FTT and dysphagia workup. US Scrotum (04/14/23) showed a 1.7 cm hypervascular solid lesion in the right testicle suspicious for neoplasm.     #) DIAGNOSIS  - Right testicular mass   - Microcytic anemia     #) PLAN    #) Right testicular mass  - Urology on board, pending outpatient orchiectomy. Suspicion for lymphoma given age.   - CT imaging does not show any evidence of metastases. Whole-body PET-CT    - AFP 1.8, , HCG 2   - Send HIV, HCV Ab. Hepatitis panel (04/14/23) negative.   - Send uric acid, phosphorus, G6PD testing   - 2D Echo     #) Microcytic anemia  - Send reticulocyte count, Iron studies   - B12 341 and Folate 6.4.   - Send myeloma panel given anemia and elevated protein gap: SPEP, serum immunofixation, free light chains and quantitative immunoglobulins. Corrected calcium 10.3. Cr 1.53, and CrCl ~ 50.     To discuss with Dr. Salvatore Padgett  67 yo M, active smoker, with no known PMHx presents with generalized weakness, body pain and difficulty swallowing x 6 months with 50 lb weight loss, admitted for FTT and dysphagia workup. US Scrotum (04/14/23) showed a 1.7 cm hypervascular solid lesion in the right testicle suspicious for neoplasm.     #) DIAGNOSIS  - Right testicular mass   - Microcytic anemia   - FHx Cancer: Uterine (Sister)    #) PLAN    #) Right testicular mass  - Urology on board, pending outpatient orchiectomy. Suspicion for lymphoma given age; however, ruling out syphilis and TB as possible causes.   - CT imaging does not show any evidence of metastases. Send Whole-body PET-CT    - AFP 1.8, , HCG 2   - Send HIV, HCV Ab. Hepatitis panel (04/14/23) negative.   - Send uric acid, phosphorus, G6PD testing   - 2D Echo   - Patient reports increasing generalized stiffness particularly in his bilateral upper arms for 3-4 months. This could be paraneoplastic. Consider sending TERE, RF, dsDNA     #) Microcytic anemia  - Send reticulocyte count, Iron studies   - B12 341 and Folate 6.4.   - Send myeloma panel given anemia and elevated protein gap: SPEP, serum immunofixation, free light chains and quantitative immunoglobulins. Corrected calcium 10.3. Cr 1.53, and CrCl ~ 50.     #) Dysphagia  - GI pending EGD tentatively on 04/17/23.     To discuss with Dr. Salvatore Padgett

## 2023-04-15 NOTE — PROGRESS NOTE ADULT - SUBJECTIVE AND OBJECTIVE BOX
Pt seen and examined at bedside.  CONNOR. Pt without new concerns. Continued dysphagia.       Allergies    No Known Allergies    Intolerances        MEDICATIONS:  MEDICATIONS  (STANDING):  azithromycin   Tablet 500 milliGRAM(s) Oral daily  cefTRIAXone   IVPB 2000 milliGRAM(s) IV Intermittent every 24 hours  enoxaparin Injectable 40 milliGRAM(s) SubCutaneous every 24 hours  nystatin    Suspension 148991 Unit(s) Oral every 24 hours  sodium chloride 0.9%. 500 milliLiter(s) (100 mL/Hr) IV Continuous <Continuous>    MEDICATIONS  (PRN):      Vital Signs Last 24 Hrs  T(C): 36.5 (2023 20:48), Max: 36.5 (2023 20:48)  T(F): 97.7 (2023 20:48), Max: 97.7 (2023 20:48)  HR: 87 (2023 20:48) (87 - 93)  BP: 106/63 (2023 20:48) (106/63 - 120/78)  BP(mean): --  RR: 17 (2023 20:48) (17 - 18)  SpO2: 100% (2023 20:48) (95% - 100%)    Parameters below as of 2023 20:48  Patient On (Oxygen Delivery Method): nasal cannula  O2 Flow (L/min): 2        PHYSICAL EXAM:    General: No acute distress, thin appearing  HEENT: MMM, conjunctiva and sclera clear  Gastrointestinal: Non-distended  Skin: Warm and dry. No obvious rash    LABS:  CBC Full  -  ( 15 Apr 2023 05:30 )  WBC Count : 11.24 K/uL  RBC Count : 4.16 M/uL  Hemoglobin : 9.8 g/dL  Hematocrit : 29.2 %  Platelet Count - Automated : 474 K/uL  Mean Cell Volume : 70.2 fl  Mean Cell Hemoglobin : 23.6 pg  Mean Cell Hemoglobin Concentration : 33.6 gm/dL  Auto Neutrophil # : x  Auto Lymphocyte # : x  Auto Monocyte # : x  Auto Eosinophil # : x  Auto Basophil # : x  Auto Neutrophil % : x  Auto Lymphocyte % : x  Auto Monocyte % : x  Auto Eosinophil % : x  Auto Basophil % : x    04-15    135  |  98  |  24<H>  ----------------------------<  73  4.0   |  28  |  1.46<H>    Ca    8.9      15 Apr 2023 05:30  Phos  4.4     04-15  Mg     2.1     04-15    TPro  8.2  /  Alb  2.2<L>  /  TBili  0.3  /  DBili  <0.2  /  AST  211<H>  /  ALT  97<H>  /  AlkPhos  54  04-15    PT/INR - ( 2023 17:53 )   PT: 13.5 sec;   INR: 1.13          PTT - ( 2023 17:53 )  PTT:29.2 sec      Urinalysis Basic - ( 2023 17:53 )    Color: Yellow / Appearance: Clear / S.020 / pH: x  Gluc: x / Ketone: NEGATIVE  / Bili: Negative / Urobili: 0.2 E.U./dL   Blood: x / Protein: 100 mg/dL / Nitrite: NEGATIVE   Leuk Esterase: NEGATIVE / RBC: < 5 /HPF / WBC < 5 /HPF   Sq Epi: x / Non Sq Epi: x / Bacteria: Present /HPF            ACC: [id number] EXAM:  XR ESOPH SNGL CON STUDY   ORDERED BY: SHREE MGAALLON     PROCEDURE DATE:  2023          INTERPRETATION:  ESOPHAGRAM    CLINICAL INFORMATION: Dysphagia; failure to thrive    COMPARISON: CT chest and CT neck from 2023.    FLUOROSCOPY TIME: 5.3 minutes    CONTRAST MATERIAL: Barium sulfate suspension double-contrast study.    FINDINGS:     chest x-ray demonstrates patchy right middle and lower lobe   airspace opacities, which may represent aspiration pneumonia. No pleural   effusion. Cardiomediastinal silhouette, bones and soft tissues normal.    Swallowing and passage of contrast through the esophagus demonstrates a   prominent cricopharyngeal bar with significant vallecular pooling of   barium sulfate suspension. In addition, the mid to distal esophagus   appears hypokinetic with a lack of coordinated primary peristalsis and   some tertiary contractions with mild dilation of the distal esophagus   near the gastroesophageal junction. No mass or stricture. No   gastroesophageal reflux was observed. There is no hiatal hernia.      IMPRESSION:  1. There is a prominent cricopharyngeal bar with significant vallecular   pooling of thin barium sulfate suspension.    2. The mid to distal esophagus appears hypokinetic with a lack of   coordinated primary peristalsis and some tertiary contractions with mild   dilation of the distal esophagus near the gastroesophageal junction,   consistent with nonspecific esophageal dysmotility such as   presbyesophagus. Broad differential may include early scleroderma or a   neuromuscular disorder.    --- End of Report ---          RADHA ADAIR MD; Resident Radiologist  This document has been electronically signed.  OLVIN SMALLS MD; Attending Radiologist  This document has been electronically signed. 2023  3:47PM

## 2023-04-15 NOTE — PROGRESS NOTE ADULT - ATTENDING COMMENTS
Discussed case with urology consult   Primary testicular malignancy unlikely given age. probabilistically, lymphoma is the most common cause of malignant testicular mass in his age group. however, presentation appears atypical -no lymphadenopathy on imaging   [ ] Agree with checking quantiferon,  [ ] Re-visit question of HIV testing --- ask again if patient is amenable   [ ] f/u MRI read, NSGY consult   [ ] Heme/onc consult     # likely gram negative pneumonia   continue treating gram negatives with ceftriaxone + azithromycin     # Dysphagia   GI planning for EGD 4/17    # ?hypothyroidism  [ ] Check TSH, Free Thyroxine

## 2023-04-15 NOTE — PROGRESS NOTE ADULT - SUBJECTIVE AND OBJECTIVE BOX
UROLOGY PROGRESS NOTE    SUBJECTIVE: Patient seen and examined bedside. Patient denies fevers/chills, HA/dizziness, nausea/vomiting, and pain is controlled. Reports no urinary issues.    azithromycin   Tablet 500 milliGRAM(s) Oral daily  cefTRIAXone   IVPB 2000 milliGRAM(s) IV Intermittent every 24 hours  enoxaparin Injectable 40 milliGRAM(s) SubCutaneous every 24 hours  nystatin    Suspension [id number] Unit(s) Oral every 24 hours      Vital Signs Last 24 Hrs  T(C): 36.3 (15 Apr 2023 12:58), Max: 36.5 (2023 20:48)  T(F): 97.4 (15 Apr 2023 12:58), Max: 97.7 (2023 20:48)  HR: 89 (15 Apr 2023 12:58) (87 - 89)  BP: 110/70 (15 Apr 2023 12:58) (106/63 - 110/70)  BP(mean): --  RR: 18 (15 Apr 2023 12:58) (17 - 18)  SpO2: 96% (15 Apr 2023 12:58) (96% - 100%)    Parameters below as of 15 Apr 2023 12:58  Patient On (Oxygen Delivery Method): room air      I&O's Detail      PHYSICAL EXAM    General: NAD, resting comfortably in bed  C/V: NSR  Pulm: Nonlabored breathing, no respiratory distress on room air  Abd: soft, ND/NT, no rebound tenderness, no guarding, no flank TTP  : b/l descended testicles firm to palpation bilaterally, right lower pole is softer, no tenderness bilaterally, cords palpable bilaterally. Normal uncircum penis.  Extrem: [facility]        LABS:                        9.8    11.24 )-----------( 474      ( 15 Apr 2023 05:30 )             29.2     04-15    135  |  98  |  24<H>  ----------------------------<  73  4.0   |  28  |  1.46<H>    Ca    8.9      15 Apr 2023 05:30  Phos  4.4     04-15  Mg     2.1     04-15    TPro  8.2  /  Alb  2.2<L>  /  TBili  0.3  /  DBili  <0.2  /  AST  211<H>  /  ALT  97<H>  /  AlkPhos  54  04-15    PT/INR - ( 2023 17:53 )   PT: 13.5 sec;   INR: 1.13          PTT - ( 2023 17:53 )  PTT:29.2 sec  Urinalysis Basic - ( 2023 17:53 )    Color: Yellow / Appearance: Clear / S.020 / pH: x  Gluc: x / Ketone: NEGATIVE  / Bili: Negative / Urobili: 0.2 E.U./dL   Blood: x / Protein: 100 mg/dL / Nitrite: NEGATIVE   Leuk Esterase: NEGATIVE / RBC: < 5 /HPF / WBC < 5 /HPF   Sq Epi: x / Non Sq Epi: x / Bacteria: Present /HPF        CULTURES:          RADIOLOGY & ADDITIONAL STUDIES:

## 2023-04-15 NOTE — CONSULT NOTE ADULT - ATTENDING COMMENTS
Seen with oncology fellow, .    69 yo M with R testicular mass, LDH elevated, AFP and bHGC wnl. Will need orchiactoym as planned by urology.  Further treatment plans after pathology obtained. No evidence of disease elsewhere.  F/u with Kalyn Miles outpatient for further oncologic care.    Total time spent on encounter, including but not limited to counseling/coordination of care: 70 mis.

## 2023-04-16 DIAGNOSIS — E03.9 HYPOTHYROIDISM, UNSPECIFIED: ICD-10-CM

## 2023-04-16 LAB
ANION GAP SERPL CALC-SCNC: 10 MMOL/L — SIGNIFICANT CHANGE UP (ref 5–17)
ANTI-RIBONUCLEAR PROTEIN: 3.6 AI — HIGH
ANTI-RIBONUCLEAR PROTEIN: 3.8 AI — HIGH
ANTI-RIBONUCLEAR PROTEIN: 3.8 AI — HIGH
BASOPHILS # BLD AUTO: 0.05 K/UL — SIGNIFICANT CHANGE UP (ref 0–0.2)
BASOPHILS NFR BLD AUTO: 0.5 % — SIGNIFICANT CHANGE UP (ref 0–2)
BUN SERPL-MCNC: 21 MG/DL — SIGNIFICANT CHANGE UP (ref 7–23)
CALCIUM SERPL-MCNC: 8.6 MG/DL — SIGNIFICANT CHANGE UP (ref 8.4–10.5)
CHLORIDE SERPL-SCNC: 100 MMOL/L — SIGNIFICANT CHANGE UP (ref 96–108)
CK SERPL-CCNC: 2378 U/L — HIGH (ref 30–200)
CO2 SERPL-SCNC: 24 MMOL/L — SIGNIFICANT CHANGE UP (ref 22–31)
CORTIS AM PEAK SERPL-MCNC: 12.44 UG/DL — SIGNIFICANT CHANGE UP (ref 6.02–18.4)
CREAT SERPL-MCNC: 1.37 MG/DL — HIGH (ref 0.5–1.3)
CRP SERPL-MCNC: 39.3 MG/L — HIGH (ref 0–4)
EGFR: 57 ML/MIN/1.73M2 — LOW
ENA JO1 AB SER-ACNC: <0.2 AI — SIGNIFICANT CHANGE UP
ENA SCL70 AB SER-ACNC: <0.2 AI — SIGNIFICANT CHANGE UP
EOSINOPHIL # BLD AUTO: 0.09 K/UL — SIGNIFICANT CHANGE UP (ref 0–0.5)
EOSINOPHIL NFR BLD AUTO: 0.9 % — SIGNIFICANT CHANGE UP (ref 0–6)
ERYTHROCYTE [SEDIMENTATION RATE] IN BLOOD: 90 MM/HR — HIGH
FERRITIN SERPL-MCNC: 6886 NG/ML — HIGH (ref 30–400)
FIBRINOGEN PPP-MCNC: 342 MG/DL — SIGNIFICANT CHANGE UP (ref 200–445)
FOLATE SERPL-MCNC: 6.4 NG/ML — SIGNIFICANT CHANGE UP
GLUCOSE BLDC GLUCOMTR-MCNC: 104 MG/DL — HIGH (ref 70–99)
GLUCOSE BLDC GLUCOMTR-MCNC: 111 MG/DL — HIGH (ref 70–99)
GLUCOSE BLDC GLUCOMTR-MCNC: 82 MG/DL — SIGNIFICANT CHANGE UP (ref 70–99)
GLUCOSE BLDC GLUCOMTR-MCNC: 96 MG/DL — SIGNIFICANT CHANGE UP (ref 70–99)
GLUCOSE SERPL-MCNC: 78 MG/DL — SIGNIFICANT CHANGE UP (ref 70–99)
HCT VFR BLD CALC: 29.2 % — LOW (ref 39–50)
HGB BLD-MCNC: 9.8 G/DL — LOW (ref 13–17)
HIV 1+2 AB+HIV1 P24 AG SERPL QL IA: SIGNIFICANT CHANGE UP
IMM GRANULOCYTES NFR BLD AUTO: 1 % — HIGH (ref 0–0.9)
IRON SATN MFR SERPL: 25 % — SIGNIFICANT CHANGE UP (ref 16–55)
IRON SATN MFR SERPL: 27 UG/DL — LOW (ref 45–165)
LDH SERPL L TO P-CCNC: 627 U/L — HIGH (ref 50–242)
LYMPHOCYTES # BLD AUTO: 0.54 K/UL — LOW (ref 1–3.3)
LYMPHOCYTES # BLD AUTO: 5.2 % — LOW (ref 13–44)
MAGNESIUM SERPL-MCNC: 1.9 MG/DL — SIGNIFICANT CHANGE UP (ref 1.6–2.6)
MCHC RBC-ENTMCNC: 23.1 PG — LOW (ref 27–34)
MCHC RBC-ENTMCNC: 33.6 GM/DL — SIGNIFICANT CHANGE UP (ref 32–36)
MCV RBC AUTO: 68.7 FL — LOW (ref 80–100)
MONOCYTES # BLD AUTO: 0.69 K/UL — SIGNIFICANT CHANGE UP (ref 0–0.9)
MONOCYTES NFR BLD AUTO: 6.6 % — SIGNIFICANT CHANGE UP (ref 2–14)
NEUTROPHILS # BLD AUTO: 9 K/UL — HIGH (ref 1.8–7.4)
NEUTROPHILS NFR BLD AUTO: 85.8 % — HIGH (ref 43–77)
NRBC # BLD: 0 /100 WBCS — SIGNIFICANT CHANGE UP (ref 0–0)
PHOSPHATE SERPL-MCNC: 3.7 MG/DL — SIGNIFICANT CHANGE UP (ref 2.5–4.5)
PLATELET # BLD AUTO: 455 K/UL — HIGH (ref 150–400)
POTASSIUM SERPL-MCNC: 4.1 MMOL/L — SIGNIFICANT CHANGE UP (ref 3.5–5.3)
POTASSIUM SERPL-SCNC: 4.1 MMOL/L — SIGNIFICANT CHANGE UP (ref 3.5–5.3)
RBC # BLD: 4.25 M/UL — SIGNIFICANT CHANGE UP (ref 4.2–5.8)
RBC # FLD: 19.8 % — HIGH (ref 10.3–14.5)
RETICS #: 71 K/UL — SIGNIFICANT CHANGE UP (ref 25–125)
RETICS/RBC NFR: 1.7 % — SIGNIFICANT CHANGE UP (ref 0.5–2.5)
SARS-COV-2 RNA SPEC QL NAA+PROBE: SIGNIFICANT CHANGE UP
SODIUM SERPL-SCNC: 134 MMOL/L — LOW (ref 135–145)
T PALLIDUM AB TITR SER: NEGATIVE — SIGNIFICANT CHANGE UP
T4 FREE SERPL-MCNC: 0.71 NG/DL — LOW (ref 0.93–1.7)
TIBC SERPL-MCNC: 109 UG/DL — LOW (ref 220–430)
TRIGL SERPL-MCNC: 176 MG/DL — HIGH
TSH SERPL-MCNC: 18.86 UIU/ML — HIGH (ref 0.27–4.2)
UIBC SERPL-MCNC: 82 UG/DL — LOW (ref 110–370)
URATE SERPL-MCNC: 6 MG/DL — SIGNIFICANT CHANGE UP (ref 3.4–8.8)
VIT B12 SERPL-MCNC: 423 PG/ML — SIGNIFICANT CHANGE UP (ref 232–1245)
WBC # BLD: 10.47 K/UL — SIGNIFICANT CHANGE UP (ref 3.8–10.5)
WBC # FLD AUTO: 10.47 K/UL — SIGNIFICANT CHANGE UP (ref 3.8–10.5)

## 2023-04-16 PROCEDURE — 99232 SBSQ HOSP IP/OBS MODERATE 35: CPT

## 2023-04-16 PROCEDURE — 99233 SBSQ HOSP IP/OBS HIGH 50: CPT

## 2023-04-16 PROCEDURE — 99222 1ST HOSP IP/OBS MODERATE 55: CPT | Mod: GC

## 2023-04-16 RX ORDER — THIAMINE MONONITRATE (VIT B1) 100 MG
500 TABLET ORAL EVERY 8 HOURS
Refills: 0 | Status: DISCONTINUED | OUTPATIENT
Start: 2023-04-16 | End: 2023-04-19

## 2023-04-16 RX ORDER — LEVOTHYROXINE SODIUM 125 MCG
50 TABLET ORAL DAILY
Refills: 0 | Status: DISCONTINUED | OUTPATIENT
Start: 2023-04-16 | End: 2023-04-19

## 2023-04-16 RX ORDER — THIAMINE MONONITRATE (VIT B1) 100 MG
500 TABLET ORAL EVERY 8 HOURS
Refills: 0 | Status: DISCONTINUED | OUTPATIENT
Start: 2023-04-16 | End: 2023-04-16

## 2023-04-16 RX ADMIN — AZITHROMYCIN 500 MILLIGRAM(S): 500 TABLET, FILM COATED ORAL at 12:00

## 2023-04-16 RX ADMIN — Medication 105 MILLIGRAM(S): at 22:43

## 2023-04-16 RX ADMIN — Medication 500000 UNIT(S): at 06:00

## 2023-04-16 RX ADMIN — CEFTRIAXONE 100 MILLIGRAM(S): 500 INJECTION, POWDER, FOR SOLUTION INTRAMUSCULAR; INTRAVENOUS at 03:26

## 2023-04-16 RX ADMIN — Medication 50 MICROGRAM(S): at 17:50

## 2023-04-16 RX ADMIN — Medication 1 TABLET(S): at 17:50

## 2023-04-16 RX ADMIN — ENOXAPARIN SODIUM 40 MILLIGRAM(S): 100 INJECTION SUBCUTANEOUS at 19:35

## 2023-04-16 NOTE — CONSULT NOTE ADULT - SUBJECTIVE AND OBJECTIVE BOX
HISTORY OF PRESENT ILLNESS  Nick Mann is a 68-year-old male with a past medical history of who presented with generalized weakness, body pain, weight loss (~15lb over the past 2 months) and dysphagia for the past ~3 months. He was experiencing difficulty swallowing solid foods which had progressed to sometimes thing foods as well. He saw his gastroenterologist Dr Meier who told him he was having aspiration but he never had imaging or work up. He took antibiotics for 'aspiration' but said that they did not help because he never had much of a cough or fever. He was admitted for failure to thrive and dysphagia work-up. he was evaluated by speech & swallow and underwent esophagogram significant for prominent CP bar. He's planned for EGD, tentatively for 4/17/23. US of his scrotum (4/14/23) was remarkable for a 1.7 cm hypervascular solid lesion in the right testicle suspicious for neoplasm. CT imaging didn't show any evidence of metastases. Urology was consulted, now planned for outpatient orchiectomy. Oncology following who stated that there is also suspicion for lymphoma given his are; however, syphilis and TB are also in the differential. The patient was noted to have abnormal thyroid function tests with TSH of 18.8 and FT4 of 0.71. Endocrinology was consulted for recommendations regarding management of hypothyroidism.     Upon evaluation, the patient states that he was previously told by his primary care that his thyroid function tests were abnormal approximately 2 weeks. However, the plan was to repeat the levels later and to continue to monitor them for now. He denied taking amiodarone or lithium in the past. No recent viral infection or history of radiation. No family history of thyroid cancer.     CAPILLARY BLOOD GLUCOSE & INSULIN RECEIVED  73 mg/dL (04-15 @ 18:58)  132 mg/dL (04-15 @ 19:05)  118 mg/dL (04-15 @ 22:28)  78 mg/dL (04-16 @ 06:18)  82 mg/dL (04-16 @ 06:40)  104 mg/dL (04-16 @ 08:26)  96 mg/dL (04-16 @ 12:49)    PAST MEDICAL & SURGICAL HISTORY  As per history of present illness.     ALLERGIES  No Known Allergies    CURRENT MEDICATIONS  azithromycin   Tablet 500 milliGRAM(s) Oral daily  cefTRIAXone   IVPB 2000 milliGRAM(s) IV Intermittent every 24 hours  enoxaparin Injectable 40 milliGRAM(s) SubCutaneous every 24 hours  levothyroxine 50 MICROGram(s) Oral daily  Nephro-james 1 Tablet(s) Oral daily  nystatin    Suspension 100689 Unit(s) Oral every 24 hours  sodium chloride 0.9%. 500 milliLiter(s) IV Continuous <Continuous>  thiamine IVPB 500 milliGRAM(s) IV Intermittent every 8 hours    REVIEW OF SYSTEMS  Constitutional:  (+) Generalized weakness. Negative fever, chills.  Eyes:  Negative blurry vision or double vision.  Cardiovascular:  Negative for chest pain or palpitations.  Respiratory:  Negative for cough, wheezing, or shortness of breath.   Gastrointestinal:  (+) Dysphagia. Negative for nausea, vomiting, diarrhea, constipation.  Neurologic:  No headache, confusion, dizziness, lightheadedness.    PHYSICAL EXAM  Vital Signs Last 24 Hrs  T(C): 36.4 (16 Apr 2023 13:12), Max: 36.5 (16 Apr 2023 06:10)  T(F): 97.5 (16 Apr 2023 13:12), Max: 97.7 (16 Apr 2023 06:10)  HR: 98 (16 Apr 2023 13:12) (89 - 98)  BP: 122/79 (16 Apr 2023 13:12) (112/67 - 133/83)  BP(mean): --  RR: 18 (16 Apr 2023 13:12) (17 - 18)  SpO2: 94% (16 Apr 2023 13:12) (94% - 97%)    Parameters below as of 16 Apr 2023 13:12  Patient On (Oxygen Delivery Method): room air    Constitutional: Awake, alert, male, in no acute distress.   HEENT: Normocephalic, atraumatic, MARY.  Neck: supple, no thyromegaly or palpable thyroid nodules.  Respiratory: Lungs clear to ausculation bilaterally.   Cardiovascular: regular rhythm, normal S1 and S2, no audible murmurs.   GI: soft, non-tender, non-distended, bowel sounds present, no masses appreciated.  Extremities: No lower extremity edema.  Psychiatric: AAO x 3.    LABS  CBC - WBC/HGB/HTC/PLT: 10.47/9.8/29.2/455 (04-16-23)  BMP: Na/K/Cl/Bicarb/BUN/Cr/Gluc: 134/4.1/100/24/21/1.37/78 (04-16-23)  Anion Gap: 10 (04-16-23)  eGFR: 57 (04-16-23)  Calcium: 8.6 (04-16-23)  Phosphorus: 3.7 (04-16-23)  Magnesium: 1.9 (04-16-23)  LFT - Alb/Tprot/Tbili/Dbili/AlkPhos/ALT/AST: 2.2/--/0.3/<0.2/54/97/211 (04-15-23)  PT/aPTT/INR: 13.5/29.2/1.13 (04-13-23)  Thyroid Stimulating Hormone, Serum: 18.860 (04-16-23)  Total T4/Free T4: --/0.712 (04-16-23)    ASSESSMENT / RECOMMENDATIONS  Mr. Mann is a 68-year-old male with a past medical history of who presented with generalized weakness, body pain, weight loss and dysphagia for the past ~3 months. He was admitted for failure to thrive and dysphagia work-up. Esophagogram significant for prominent CP bar. He's planned for EGD, tentatively for 4/17/23. In addition, he was found to have a 1.7 cm hypervascular solid lesion in the right testicle suspicious for neoplasm. CT imaging didn't show any evidence of metastases. Urology/Oncology following, planned for outpatient orchiectomy. The patient was noted to have abnormal thyroid function tests with TSH of 18.8 and FT4 of 0.71. Endocrinology was consulted for recommendations regarding management of abnormal thyroid function tests.     A1C: BUN: 21  Creatinine: 1.37  GFR: 57  Weight: 71.8  BMI: 21.3    # Hypothyroidism   - Please start levothyroxine 50 mcg oral daily (would not give a higher dose for now given age and unknown cardiac status)  - May repeat free T4 in ~1 week (4/24/23) if he is still admitted. Otherwise, may repeat thyroid function tests within 4-6 weeks.   - Patient can follow up at discharge with [facility] by calling [phone] to make an appointment.      Case discussed with Dr. Ziegler. Primary team updated.       Elmer Haddad    Endocrinology Fellow    Service Pager: [phone]  HISTORY OF PRESENT ILLNESS  Nick Mann is a 68-year-old male with a past medical history of who presented with generalized weakness, body pain, weight loss (~15lb over the past 2 months) and dysphagia for the past ~3 months. He was experiencing difficulty swallowing solid foods which had progressed to sometimes thing foods as well. He saw his gastroenterologist Dr Meier who told him he was having aspiration but he never had imaging or work up. He took antibiotics for 'aspiration' but said that they did not help because he never had much of a cough or fever. He was admitted for failure to thrive and dysphagia work-up. He was evaluated by speech & swallow and underwent esophagogram significant for prominent CP bar. He's planned for EGD, tentatively for 4/17/23. US of his scrotum (4/14/23) was remarkable for a 1.7 cm hypervascular solid lesion in the right testicle suspicious for neoplasm. CT imaging didn't show any evidence of metastases. Urology was consulted, now planned for outpatient orchiectomy. Oncology following who stated that there is also suspicion for lymphoma given his are; however, syphilis and TB are also in the differential. The patient was noted to have abnormal thyroid function tests with TSH of 18.8 and FT4 of 0.71. Endocrinology was consulted for recommendations regarding management of hypothyroidism.     Upon evaluation, the patient states that he was previously told by his primary care that his thyroid function tests were abnormal approximately 2 weeks. However, the plan was to repeat the levels later and to continue to monitor them for now. He denied taking amiodarone or lithium in the past. No recent viral infection or history of radiation. No family history of thyroid cancer.     CAPILLARY BLOOD GLUCOSE & INSULIN RECEIVED  73 mg/dL (04-15 @ 18:58)  132 mg/dL (04-15 @ 19:05)  118 mg/dL (04-15 @ 22:28)  78 mg/dL (04-16 @ 06:18)  82 mg/dL (04-16 @ 06:40)  104 mg/dL (04-16 @ 08:26)  96 mg/dL (04-16 @ 12:49)    PAST MEDICAL & SURGICAL HISTORY  As per history of present illness.     ALLERGIES  No Known Allergies    CURRENT MEDICATIONS  azithromycin   Tablet 500 milliGRAM(s) Oral daily  cefTRIAXone   IVPB 2000 milliGRAM(s) IV Intermittent every 24 hours  enoxaparin Injectable 40 milliGRAM(s) SubCutaneous every 24 hours  levothyroxine 50 MICROGram(s) Oral daily  Nephro-james 1 Tablet(s) Oral daily  nystatin    Suspension 828555 Unit(s) Oral every 24 hours  sodium chloride 0.9%. 500 milliLiter(s) IV Continuous <Continuous>  thiamine IVPB 500 milliGRAM(s) IV Intermittent every 8 hours    REVIEW OF SYSTEMS  Constitutional:  (+) Generalized weakness. Negative fever, chills.  Eyes:  Negative blurry vision or double vision.  Cardiovascular:  Negative for chest pain or palpitations.  Respiratory:  Negative for cough, wheezing, or shortness of breath.   Gastrointestinal:  (+) Dysphagia. Negative for nausea, vomiting, diarrhea, constipation.  Neurologic:  No headache, confusion, dizziness, lightheadedness.    PHYSICAL EXAM  Vital Signs Last 24 Hrs  T(C): 36.4 (16 Apr 2023 13:12), Max: 36.5 (16 Apr 2023 06:10)  T(F): 97.5 (16 Apr 2023 13:12), Max: 97.7 (16 Apr 2023 06:10)  HR: 98 (16 Apr 2023 13:12) (89 - 98)  BP: 122/79 (16 Apr 2023 13:12) (112/67 - 133/83)  BP(mean): --  RR: 18 (16 Apr 2023 13:12) (17 - 18)  SpO2: 94% (16 Apr 2023 13:12) (94% - 97%)    Parameters below as of 16 Apr 2023 13:12  Patient On (Oxygen Delivery Method): room air    Constitutional: Awake, alert, male, in no acute distress.   HEENT: Normocephalic, atraumatic, MARY.  Neck: supple, no thyromegaly or palpable thyroid nodules.  Respiratory: Lungs clear to ausculation bilaterally.   Cardiovascular: regular rhythm, normal S1 and S2, no audible murmurs.   GI: soft, non-tender, non-distended, bowel sounds present, no masses appreciated.  Extremities: No lower extremity edema.  Psychiatric: AAO x 3.    LABS  CBC - WBC/HGB/HTC/PLT: 10.47/9.8/29.2/455 (04-16-23)  BMP: Na/K/Cl/Bicarb/BUN/Cr/Gluc: 134/4.1/100/24/21/1.37/78 (04-16-23)  Anion Gap: 10 (04-16-23)  eGFR: 57 (04-16-23)  Calcium: 8.6 (04-16-23)  Phosphorus: 3.7 (04-16-23)  Magnesium: 1.9 (04-16-23)  LFT - Alb/Tprot/Tbili/Dbili/AlkPhos/ALT/AST: 2.2/--/0.3/<0.2/54/97/211 (04-15-23)  PT/aPTT/INR: 13.5/29.2/1.13 (04-13-23)  Thyroid Stimulating Hormone, Serum: 18.860 (04-16-23)  Total T4/Free T4: --/0.712 (04-16-23)    ASSESSMENT / RECOMMENDATIONS  Mr. Mann is a 68-year-old male with a past medical history of who presented with generalized weakness, body pain, weight loss and dysphagia for the past ~3 months. He was admitted for failure to thrive and dysphagia work-up. Esophagogram significant for prominent CP bar. He's planned for EGD, tentatively for 4/17/23. In addition, he was found to have a 1.7 cm hypervascular solid lesion in the right testicle suspicious for neoplasm. CT imaging didn't show any evidence of metastases. Urology/Oncology following, planned for outpatient orchiectomy. The patient was noted to have abnormal thyroid function tests with TSH of 18.8 and FT4 of 0.71. Endocrinology was consulted for recommendations regarding management of abnormal thyroid function tests.     A1C: BUN: 21  Creatinine: 1.37  GFR: 57  Weight: 71.8  BMI: 21.3    # Hypothyroidism   - Please start levothyroxine 50 mcg oral daily (would not give a higher dose for now given age and unknown cardiac status)  - May repeat free T4 in ~1 week (4/24/23) if he is still admitted. Otherwise, may repeat thyroid function tests within 4-6 weeks.   - Patient can follow up at discharge with [facility] by calling [phone] to make an appointment.      Case discussed with Dr. Ziegler. Primary team updated.       Elmer Haddad    Endocrinology Fellow    Service Pager: [phone]  HISTORY OF PRESENT ILLNESS  Nick Mann is a 68-year-old male with a past medical history of who presented with generalized weakness, body pain, weight loss (~15lb over the past 2 months) and dysphagia for the past ~3 months. He was experiencing difficulty swallowing solid foods which had progressed to sometimes thing foods as well. He saw his gastroenterologist Dr Meier who told him he was having aspiration but he never had imaging or work up. He took antibiotics for 'aspiration' but said that they did not help because he never had much of a cough or fever. He was admitted for failure to thrive and dysphagia work-up. He was evaluated by speech & swallow and underwent esophagogram significant for prominent CP bar. He's planned for EGD, tentatively for 4/17/23. US of his scrotum (4/14/23) was remarkable for a 1.7 cm hypervascular solid lesion in the right testicle suspicious for neoplasm. CT imaging didn't show any evidence of metastases. Urology was consulted, now planned for outpatient orchiectomy. Oncology following who stated that there is also suspicion for lymphoma given his are; however, syphilis and TB are also in the differential. The patient was noted to have abnormal thyroid function tests with TSH of 18.8 and FT4 of 0.71. Endocrinology was consulted for recommendations regarding management of hypothyroidism.     Upon evaluation, the patient states that he was previously told by his primary care that his thyroid function tests were abnormal approximately 2 weeks. However, the plan was to repeat the levels later and to continue to monitor them for now. He denied taking amiodarone or lithium in the past. No recent viral infection or history of radiation. No family history of thyroid cancer.     CAPILLARY BLOOD GLUCOSE & INSULIN RECEIVED  73 mg/dL (04-15 @ 18:58)  132 mg/dL (04-15 @ 19:05)  118 mg/dL (04-15 @ 22:28)  78 mg/dL (04-16 @ 06:18)  82 mg/dL (04-16 @ 06:40)  104 mg/dL (04-16 @ 08:26)  96 mg/dL (04-16 @ 12:49)    PAST MEDICAL & SURGICAL HISTORY  As per history of present illness.     ALLERGIES  No Known Allergies    CURRENT MEDICATIONS  azithromycin   Tablet 500 milliGRAM(s) Oral daily  cefTRIAXone   IVPB 2000 milliGRAM(s) IV Intermittent every 24 hours  enoxaparin Injectable 40 milliGRAM(s) SubCutaneous every 24 hours  levothyroxine 50 MICROGram(s) Oral daily  Nephro-james 1 Tablet(s) Oral daily  nystatin    Suspension 429805 Unit(s) Oral every 24 hours  sodium chloride 0.9%. 500 milliLiter(s) IV Continuous <Continuous>  thiamine IVPB 500 milliGRAM(s) IV Intermittent every 8 hours    REVIEW OF SYSTEMS  Constitutional:  (+) Generalized weakness. Negative fever, chills.  Eyes:  Negative blurry vision or double vision.  Cardiovascular:  Negative for chest pain or palpitations.  Respiratory:  Negative for cough, wheezing, or shortness of breath.   Gastrointestinal:  (+) Dysphagia. Negative for nausea, vomiting, diarrhea, constipation.  Neurologic:  No headache, confusion, dizziness, lightheadedness.    PHYSICAL EXAM  Vital Signs Last 24 Hrs  T(C): 36.4 (16 Apr 2023 13:12), Max: 36.5 (16 Apr 2023 06:10)  T(F): 97.5 (16 Apr 2023 13:12), Max: 97.7 (16 Apr 2023 06:10)  HR: 98 (16 Apr 2023 13:12) (89 - 98)  BP: 122/79 (16 Apr 2023 13:12) (112/67 - 133/83)  BP(mean): --  RR: 18 (16 Apr 2023 13:12) (17 - 18)  SpO2: 94% (16 Apr 2023 13:12) (94% - 97%)    Parameters below as of 16 Apr 2023 13:12  Patient On (Oxygen Delivery Method): room air    Constitutional: Awake, alert, male, in no acute distress.   HEENT: Normocephalic, atraumatic, MARY.  Neck: supple, no thyromegaly or palpable thyroid nodules.  Respiratory: Lungs clear to ausculation bilaterally.   Cardiovascular: regular rhythm, normal S1 and S2, no audible murmurs.   GI: soft, non-tender, non-distended, bowel sounds present, no masses appreciated.  Extremities: No lower extremity edema.  Psychiatric: AAO x 3.    LABS  CBC - WBC/HGB/HTC/PLT: 10.47/9.8/29.2/455 (04-16-23)  BMP: Na/K/Cl/Bicarb/BUN/Cr/Gluc: 134/4.1/100/24/21/1.37/78 (04-16-23)  Anion Gap: 10 (04-16-23)  eGFR: 57 (04-16-23)  Calcium: 8.6 (04-16-23)  Phosphorus: 3.7 (04-16-23)  Magnesium: 1.9 (04-16-23)  LFT - Alb/Tprot/Tbili/Dbili/AlkPhos/ALT/AST: 2.2/--/0.3/<0.2/54/97/211 (04-15-23)  PT/aPTT/INR: 13.5/29.2/1.13 (04-13-23)  Thyroid Stimulating Hormone, Serum: 18.860 (04-16-23)  Total T4/Free T4: --/0.712 (04-16-23)    ASSESSMENT / RECOMMENDATIONS  Mr. Mann is a 68-year-old male with a past medical history of who presented with generalized weakness, body pain, weight loss and dysphagia for the past ~3 months. He was admitted for failure to thrive and dysphagia work-up. Esophagogram significant for prominent CP bar. He's planned for EGD, tentatively for 4/17/23. In addition, he was found to have a 1.7 cm hypervascular solid lesion in the right testicle suspicious for neoplasm. CT imaging didn't show any evidence of metastases. Urology/Oncology following, planned for outpatient orchiectomy. The patient was noted to have abnormal thyroid function tests with TSH of 18.8 and FT4 of 0.71. Endocrinology was consulted for recommendations regarding management of abnormal thyroid function tests.     A1C: BUN: 21  Creatinine: 1.37  GFR: 57  Weight: 71.8  BMI: 21.3    # Hypothyroidism   - Please start levothyroxine 50 mcg oral daily (would not give a higher dose for now given age and unknown cardiac status)  - May repeat TFT in ~1 week (4/24/23) if he is still admitted. Otherwise, may repeat thyroid function tests within 4-6 weeks.   - Patient can follow up at discharge with [facility] by calling [phone] to make an appointment.      Case discussed with Dr. Ziegler. Primary team updated.       Elmer Haddad    Endocrinology Fellow    Service Pager: [phone]  HISTORY OF PRESENT ILLNESS  Nick Mann is a 68-year-old male with a past medical history of who presented with generalized weakness, body pain, weight loss (~15lb over the past 2 months) and dysphagia for the past ~3 months. He was experiencing difficulty swallowing solid foods which had progressed to sometimes thing foods as well. He saw his gastroenterologist Dr Meier who told him he was having aspiration but he never had imaging or work up. He took antibiotics for 'aspiration' but said that they did not help because he never had much of a cough or fever. He was admitted for failure to thrive and dysphagia work-up. He was evaluated by speech & swallow and underwent esophagogram significant for prominent CP bar. He's planned for EGD, tentatively for 4/17/23. US of his scrotum (4/14/23) was remarkable for a 1.7 cm hypervascular solid lesion in the right testicle suspicious for neoplasm. CT imaging didn't show any evidence of metastases. Urology was consulted, now planned for outpatient orchiectomy. Oncology following who stated that there is also suspicion for lymphoma given his are; however, syphilis and TB are also in the differential. The patient was noted to have abnormal thyroid function tests with TSH of 18.8 and FT4 of 0.71. Endocrinology was consulted for recommendations regarding management of hypothyroidism.     Upon evaluation, the patient states that he was previously told by his primary care that his thyroid function tests were abnormal approximately 2 weeks. However, the plan was to repeat the levels later and to continue to monitor them for now. He denied taking amiodarone or lithium in the past. No recent viral infection or history of radiation. No family history of thyroid cancer.     CAPILLARY BLOOD GLUCOSE & INSULIN RECEIVED  73 mg/dL (04-15 @ 18:58)  132 mg/dL (04-15 @ 19:05)  118 mg/dL (04-15 @ 22:28)  78 mg/dL (04-16 @ 06:18)  82 mg/dL (04-16 @ 06:40)  104 mg/dL (04-16 @ 08:26)  96 mg/dL (04-16 @ 12:49)    PAST MEDICAL & SURGICAL HISTORY  As per history of present illness.     ALLERGIES  No Known Allergies    CURRENT MEDICATIONS  azithromycin   Tablet 500 milliGRAM(s) Oral daily  cefTRIAXone   IVPB 2000 milliGRAM(s) IV Intermittent every 24 hours  enoxaparin Injectable 40 milliGRAM(s) SubCutaneous every 24 hours  levothyroxine 50 MICROGram(s) Oral daily  Nephro-james 1 Tablet(s) Oral daily  nystatin    Suspension 861171 Unit(s) Oral every 24 hours  sodium chloride 0.9%. 500 milliLiter(s) IV Continuous <Continuous>  thiamine IVPB 500 milliGRAM(s) IV Intermittent every 8 hours    REVIEW OF SYSTEMS  Constitutional:  (+) Generalized weakness. Negative fever, chills.  Eyes:  Negative blurry vision or double vision.  Cardiovascular:  Negative for chest pain or palpitations.  Respiratory:  Negative for cough, wheezing, or shortness of breath.   Gastrointestinal:  (+) Dysphagia. Negative for nausea, vomiting, diarrhea, constipation.  Neurologic:  No headache, confusion, dizziness, lightheadedness.    PHYSICAL EXAM  Vital Signs Last 24 Hrs  T(C): 36.4 (16 Apr 2023 13:12), Max: 36.5 (16 Apr 2023 06:10)  T(F): 97.5 (16 Apr 2023 13:12), Max: 97.7 (16 Apr 2023 06:10)  HR: 98 (16 Apr 2023 13:12) (89 - 98)  BP: 122/79 (16 Apr 2023 13:12) (112/67 - 133/83)  BP(mean): --  RR: 18 (16 Apr 2023 13:12) (17 - 18)  SpO2: 94% (16 Apr 2023 13:12) (94% - 97%)    Parameters below as of 16 Apr 2023 13:12  Patient On (Oxygen Delivery Method): room air    Constitutional: Awake, alert, male, in no acute distress.   HEENT: Normocephalic, atraumatic, MARY.  Neck: supple, no thyromegaly or palpable thyroid nodules.  Respiratory: Lungs clear to ausculation bilaterally.   Cardiovascular: regular rhythm, normal S1 and S2, no audible murmurs.   GI: soft, non-tender, non-distended, bowel sounds present, no masses appreciated.  Extremities: No lower extremity edema.  Psychiatric: AAO x 3.    LABS  CBC - WBC/HGB/HTC/PLT: 10.47/9.8/29.2/455 (04-16-23)  BMP: Na/K/Cl/Bicarb/BUN/Cr/Gluc: 134/4.1/100/24/21/1.37/78 (04-16-23)  Anion Gap: 10 (04-16-23)  eGFR: 57 (04-16-23)  Calcium: 8.6 (04-16-23)  Phosphorus: 3.7 (04-16-23)  Magnesium: 1.9 (04-16-23)  LFT - Alb/Tprot/Tbili/Dbili/AlkPhos/ALT/AST: 2.2/--/0.3/<0.2/54/97/211 (04-15-23)  PT/aPTT/INR: 13.5/29.2/1.13 (04-13-23)  Thyroid Stimulating Hormone, Serum: 18.860 (04-16-23)  Total T4/Free T4: --/0.712 (04-16-23)    ASSESSMENT / RECOMMENDATIONS  Mr. Mann is a 68-year-old male with a past medical history of who presented with generalized weakness, body pain, weight loss and dysphagia for the past ~3 months. He was admitted for failure to thrive and dysphagia work-up. Esophagogram significant for prominent CP bar. He's planned for EGD, tentatively for 4/17/23. In addition, he was found to have a 1.7 cm hypervascular solid lesion in the right testicle suspicious for neoplasm. CT imaging didn't show any evidence of metastases. Urology/Oncology following, planned for outpatient orchiectomy. The patient was noted to have abnormal thyroid function tests with TSH of 18.8 and FT4 of 0.71. Endocrinology was consulted for recommendations regarding management of abnormal thyroid function tests.     BUN: 21  Creatinine: 1.37  GFR: 57  Weight: 71.8  BMI: 21.3    # Hypothyroidism   - TSH 18.8. FT4 0.71 (4/16/23).   - Please start levothyroxine 50 mcg oral daily (would not give a higher dose for now given age and unknown cardiac status)  - May repeat TFT in ~1 week (4/24/23) if he is still admitted. Otherwise, may repeat thyroid function tests within 4-6 weeks.   - Patient can follow up at discharge with [facility] by calling [phone] to make an appointment.      Case discussed with Dr. Bukberg. Primary team updated.       Elmer Haddad    Endocrinology Fellow    Service Pager: [phone]

## 2023-04-16 NOTE — PROGRESS NOTE ADULT - TIME BILLING
Review of hospital course, labs, vitals, medical records.   Bedside exam and interview   Discussed plan of care with primary team mirta  Discussed case with urology consult attd.   Documenting the encounter
Review of hospital course, labs, vitals, medical records.   Bedside exam and interview   Discussed plan of care with primary team housestaff   Documenting the encounter

## 2023-04-16 NOTE — PROGRESS NOTE ADULT - ATTENDING COMMENTS
Patient seen and d/w Dr. Liriano. Agree with plan above.  Family at bedside.  Patient now weaned off supplemental oxygen. Will tentatively plan for EGD to further evaluate for Zenker's diverticulum and other etiologies of esophageal dysmotility. All questions answered.

## 2023-04-16 NOTE — PROGRESS NOTE ADULT - ASSESSMENT
67yo male with no known PMhx admitted with progressive weight loss, and oropharyngeal dysphagia with imaging thus far noting L parietal lobe lesion, mildly dilated prox/mid esophagus with ongoing multi-speciality oncologic eval    GI consulted for dysphagia    Hemodynamically stable.  Persistent microcytic anemia without clinical GIB    Esophagus is mildly dilated in prox and mid esophagus, though CT limited as PO contrast not administered. Liver nml appearing on RUQUS with CBD 6mm  Esophagram noting prominent CP bar which can explain oropharyngeal dysphagia, though cannot exclude alternative explanation for previously noted aspirations or sx, particularly given mid-distal esophagus hypokinesis also seen.  Luminal abnormality cannot be excluded though dysmotility syndrome also on ddx    Recommendations:  -Appreciate SLP evaluation.  Assistance to help pt reduce risk of aspiration a/w CP bar requested.  -Agree with full thin liquid diet as recommended by speech pathologist  -Plan for EGD. Tentatively 4/17. Please make NPO at midnight on 4/16  -Please repeat COVID19 testing    Dixon Liriano DO  Gastroenterology Fellow  Pager: [phone]

## 2023-04-16 NOTE — PROGRESS NOTE ADULT - SUBJECTIVE AND OBJECTIVE BOX
TRE BRENNAN  68y  Male    Patient is a 68y old  Male who presents with a chief complaint of ftt (16 Apr 2023 11:42)      INTERVAL HPI/OVERNIGHT EVENTS:  As per night team, DAHIANA.   Patient was seen and examined at bedside with no acute complaints.       T(C): 36.4 (04-16-23 @ 13:12), Max: 36.5 (04-16-23 @ 06:10)  HR: 98 (04-16-23 @ 13:12) (89 - 98)  BP: 122/79 (04-16-23 @ 13:12) (112/67 - 133/83)  RR: 18 (04-16-23 @ 13:12) (17 - 18)  SpO2: 94% (04-16-23 @ 13:12) (94% - 97%)  Wt(kg): --Vital Signs Last 24 Hrs  T(C): 36.4 (16 Apr 2023 13:12), Max: 36.5 (16 Apr 2023 06:10)  T(F): 97.5 (16 Apr 2023 13:12), Max: 97.7 (16 Apr 2023 06:10)  HR: 98 (16 Apr 2023 13:12) (89 - 98)  BP: 122/79 (16 Apr 2023 13:12) (112/67 - 133/83)  BP(mean): --  RR: 18 (16 Apr 2023 13:12) (17 - 18)  SpO2: 94% (16 Apr 2023 13:12) (94% - 97%)    Parameters below as of 16 Apr 2023 13:12  Patient On (Oxygen Delivery Method): room air        PHYSICAL EXAM:  Gen: Reclining in bed at time of exam, appears stated age  HEENT: MMM, clear OP  Neck: trachea at midline  CV: RRR, +S1/S2  Pulm: adequate respiratory effort, no increase in work of breathing  Abd: soft, ND  Skin: warm and dry,   Ext: no LE edema   Neuro: AOx3, speaking in full sentences  Psych: affect and behavior appropriate, pleasant at time of interview    Consultant(s) Notes Reviewed:  [x ] YES  [ ] NO  Care Discussed with Consultants/Other Providers [ x] YES  [ ] NO    LABS:                        9.8    10.47 )-----------( 455      ( 16 Apr 2023 06:18 )             29.2     04-16    134<L>  |  100  |  21  ----------------------------<  78  4.1   |  24  |  1.37<H>    Ca    8.6      16 Apr 2023 06:18  Phos  3.7     04-16  Mg     1.9     04-16    TPro  8.2  /  Alb  2.2<L>  /  TBili  0.3  /  DBili  <0.2  /  AST  211<H>  /  ALT  97<H>  /  AlkPhos  54  04-15    Iron 27, TIBC 109, %Sat 25, Ferritin 6886/ 04-16-23 @ 06:18        CAPILLARY BLOOD GLUCOSE      POCT Blood Glucose.: 96 mg/dL (16 Apr 2023 12:49)  POCT Blood Glucose.: 104 mg/dL (16 Apr 2023 08:26)  POCT Blood Glucose.: 82 mg/dL (16 Apr 2023 06:40)  POCT Blood Glucose.: 118 mg/dL (15 Apr 2023 22:28)  POCT Blood Glucose.: 132 mg/dL (15 Apr 2023 19:05)  POCT Blood Glucose.: 73 mg/dL (15 Apr 2023 18:58)  POCT Blood Glucose.: 62 mg/dL (15 Apr 2023 18:08)            MEDICATIONS  (STANDING):  azithromycin   Tablet 500 milliGRAM(s) Oral daily  cefTRIAXone   IVPB 2000 milliGRAM(s) IV Intermittent every 24 hours  enoxaparin Injectable 40 milliGRAM(s) SubCutaneous every 24 hours  levothyroxine 50 MICROGram(s) Oral daily  nystatin    Suspension 010920 Unit(s) Oral every 24 hours  polyethylene glycol 3350 17 Gram(s) Oral daily  sodium chloride 0.9%. 500 milliLiter(s) (100 mL/Hr) IV Continuous <Continuous>    MEDICATIONS  (PRN):      RADIOLOGY & ADDITIONAL TESTS:    Imaging Personally Reviewed:  [ ] YES  [ ] NO   TRE BRENNAN  68y  Male    Patient is a 68y old  Male who presents with a chief complaint of ftt (16 Apr 2023 11:42)    INTERVAL HPI/OVERNIGHT EVENTS:  As per night team, DAHIANA.   Patient was seen and examined at bedside with no acute complaints.     T(C): 36.4 (04-16-23 @ 13:12), Max: 36.5 (04-16-23 @ 06:10)  HR: 98 (04-16-23 @ 13:12) (89 - 98)  BP: 122/79 (04-16-23 @ 13:12) (112/67 - 133/83)  RR: 18 (04-16-23 @ 13:12) (17 - 18)  SpO2: 94% (04-16-23 @ 13:12) (94% - 97%)  Wt(kg): --Vital Signs Last 24 Hrs  T(C): 36.4 (16 Apr 2023 13:12), Max: 36.5 (16 Apr 2023 06:10)  T(F): 97.5 (16 Apr 2023 13:12), Max: 97.7 (16 Apr 2023 06:10)  HR: 98 (16 Apr 2023 13:12) (89 - 98)  BP: 122/79 (16 Apr 2023 13:12) (112/67 - 133/83)  BP(mean): --  RR: 18 (16 Apr 2023 13:12) (17 - 18)  SpO2: 94% (16 Apr 2023 13:12) (94% - 97%)    Parameters below as of 16 Apr 2023 13:12  Patient On (Oxygen Delivery Method): room air    PHYSICAL EXAM:  Gen: Reclining in bed at time of exam, appears stated age  HEENT: MMM, clear OP  Neck: trachea at midline  CV: RRR, +S1/S2  Pulm: adequate respiratory effort, no increase in work of breathing  Abd: soft, ND  Skin: warm and dry,   Ext: no LE edema   Neuro: AOx3, speaking in full sentences  Psych: affect and behavior appropriate, pleasant at time of interview    Consultant(s) Notes Reviewed:  [x ] YES  [ ] NO  Care Discussed with Consultants/Other Providers [ x] YES  [ ] NO    LABS:                        9.8    10.47 )-----------( 455      ( 16 Apr 2023 06:18 )             29.2     04-16    134<L>  |  100  |  21  ----------------------------<  78  4.1   |  24  |  1.37<H>    Ca    8.6      16 Apr 2023 06:18  Phos  3.7     04-16  Mg     1.9     04-16    TPro  8.2  /  Alb  2.2<L>  /  TBili  0.3  /  DBili  <0.2  /  AST  211<H>  /  ALT  97<H>  /  AlkPhos  54  04-15    Iron 27, TIBC 109, %Sat 25, Ferritin 6886/ 04-16-23 @ 06:18        CAPILLARY BLOOD GLUCOSE      POCT Blood Glucose.: 96 mg/dL (16 Apr 2023 12:49)  POCT Blood Glucose.: 104 mg/dL (16 Apr 2023 08:26)  POCT Blood Glucose.: 82 mg/dL (16 Apr 2023 06:40)  POCT Blood Glucose.: 118 mg/dL (15 Apr 2023 22:28)  POCT Blood Glucose.: 132 mg/dL (15 Apr 2023 19:05)  POCT Blood Glucose.: 73 mg/dL (15 Apr 2023 18:58)  POCT Blood Glucose.: 62 mg/dL (15 Apr 2023 18:08)            MEDICATIONS  (STANDING):  azithromycin   Tablet 500 milliGRAM(s) Oral daily  cefTRIAXone   IVPB 2000 milliGRAM(s) IV Intermittent every 24 hours  enoxaparin Injectable 40 milliGRAM(s) SubCutaneous every 24 hours  levothyroxine 50 MICROGram(s) Oral daily  nystatin    Suspension 414400 Unit(s) Oral every 24 hours  polyethylene glycol 3350 17 Gram(s) Oral daily  sodium chloride 0.9%. 500 milliLiter(s) (100 mL/Hr) IV Continuous <Continuous>    MEDICATIONS  (PRN):      RADIOLOGY & ADDITIONAL TESTS:    Imaging Personally Reviewed:  [ ] YES  [ ] NO

## 2023-04-16 NOTE — PROGRESS NOTE ADULT - ATTENDING COMMENTS
Discussed case with urology consult   Primary testicular malignancy unlikely given age. probabilistically, lymphoma is the most common cause of malignant testicular mass in his age group. however, presentation appears atypical -no lymphadenopathy on imaging   CT head previously showed possible ring enhancing lesion , however, not visualized on MRI     [ ] Ferritin >6000, positive anti ribonuclear protein, creatine kinase >2000 + complaint of weakness;  Suspect rheumatologic process. Rheumatology consult on Monday   [ ] f/u heme/onc final recs   [ ] Patient is a vocalist. New hoarseness since onset of symptoms. Also has been having dysphagia; ENT consult for vocal cord dysfunction.   [ ] TSH 18, Ft4 ~0.7. Starting on levothyroxine 50mcg for hypothyroidism  [ ] Continue treating aspiration PNA with Ceftriaxone + Azithro

## 2023-04-16 NOTE — PROGRESS NOTE ADULT - SUBJECTIVE AND OBJECTIVE BOX
Pt seen and examined at bedside.  NAEO.  No longer requiring supplemental O2.  Resting comfortable. NAD  Denies nausea/vomiting.  Persistent oropharyngeal dysphagia but tolerating diet    Allergies    No Known Allergies    Intolerances        MEDICATIONS:  MEDICATIONS  (STANDING):  azithromycin   Tablet 500 milliGRAM(s) Oral daily  cefTRIAXone   IVPB 2000 milliGRAM(s) IV Intermittent every 24 hours  enoxaparin Injectable 40 milliGRAM(s) SubCutaneous every 24 hours  levothyroxine 50 MICROGram(s) Oral daily  nystatin    Suspension 599055 Unit(s) Oral every 24 hours  polyethylene glycol 3350 17 Gram(s) Oral daily  sodium chloride 0.9%. 500 milliLiter(s) (100 mL/Hr) IV Continuous <Continuous>    MEDICATIONS  (PRN):      Vital Signs Last 24 Hrs  T(C): 36.5 (16 Apr 2023 06:10), Max: 36.5 (16 Apr 2023 06:10)  T(F): 97.7 (16 Apr 2023 06:10), Max: 97.7 (16 Apr 2023 06:10)  HR: 97 (16 Apr 2023 06:10) (89 - 97)  BP: 112/67 (16 Apr 2023 06:10) (110/70 - 133/83)  BP(mean): --  RR: 18 (16 Apr 2023 06:10) (17 - 18)  SpO2: 95% (16 Apr 2023 06:10) (95% - 97%)    Parameters below as of 16 Apr 2023 06:10  Patient On (Oxygen Delivery Method): room air          PHYSICAL EXAM:    General: No acute distress, thin appearing  HEENT: MMM, conjunctiva and sclera clear  Gastrointestinal: Soft non-tender non-distended. No rebound or guarding  Skin: Warm and dry. No obvious rash    LABS:  CBC Full  -  ( 16 Apr 2023 06:18 )  WBC Count : 10.47 K/uL  RBC Count : 4.25 M/uL  Hemoglobin : 9.8 g/dL  Hematocrit : 29.2 %  Platelet Count - Automated : 455 K/uL  Mean Cell Volume : 68.7 fl  Mean Cell Hemoglobin : 23.1 pg  Mean Cell Hemoglobin Concentration : 33.6 gm/dL  Auto Neutrophil # : 9.00 K/uL  Auto Lymphocyte # : 0.54 K/uL  Auto Monocyte # : 0.69 K/uL  Auto Eosinophil # : 0.09 K/uL  Auto Basophil # : 0.05 K/uL  Auto Neutrophil % : 85.8 %  Auto Lymphocyte % : 5.2 %  Auto Monocyte % : 6.6 %  Auto Eosinophil % : 0.9 %  Auto Basophil % : 0.5 %    04-16    134<L>  |  100  |  21  ----------------------------<  78  4.1   |  24  |  1.37<H>    Ca    8.6      16 Apr 2023 06:18  Phos  3.7     04-16  Mg     1.9     04-16    TPro  8.2  /  Alb  2.2<L>  /  TBili  0.3  /  DBili  <0.2  /  AST  211<H>  /  ALT  97<H>  /  AlkPhos  54  04-15                      RADIOLOGY & ADDITIONAL STUDIES:

## 2023-04-16 NOTE — PROGRESS NOTE ADULT - PROBLEM SELECTOR PLAN 2
Patient with months of dysphagia - started with solid foods now progressively getting worse. Has seen GI who stated he was aspirating but has had no further work up since. Also received laryngoscopy with ENT outpatient in January, which showed green fluid and pt given 14days of augmentin, pt did not complete abx course.  - CT soft tissue showing Layering fluid within the esophagus with patchy consolidations in the included bilateral upper lobes, most likely representing aspiration.  Plan:  -f/u scleroderma rheum labs  -s+s eval  [ ] per GI  planned for EGD on 4.17- NPO at [facility]

## 2023-04-16 NOTE — CONSULT NOTE ADULT - ATTENDING COMMENTS
Pt seen on rounds this afternoon.  67yo man who was admitted for generalized weakness, weight loss (15 lb in 2 months) and dysphagia.  Esophogram showed a CP bar and abnormal esophageal motility.  Speech/swallow evaluation was significant for Pt seen on rounds this afternoon.  67yo man who was admitted for generalized weakness, weight loss (15 lb in 2 months) and dysphagia.  Esophogram showed a CP bar and abnormal esophageal motility.  Speech/swallow evaluation noted significant oropharyngeal dysphagia/muscle dysfunction.  Chest imaging suggested probable aspiration PNA (despite absence of fever or couth)  Endocrine consult requested for recommendations regarding management of hypothyroidism, which the pt says was first noted by his PCP 2 weeks ago.  Treatment was deferred pending repeat testing.  HIs current TFTs show a free T4 of 0.7 ng/dl, TSH elevated to 18 uU/ml.  He denies any swelling in the thyroid area.  His gland is not enlarged on exam.  Physical exam is notable for a chronically ill-appearing man.  Is wearing a cervical collar because of neck pain.  Has no ophthalmopathy.  Lungs with rales at the R base,  Heart--RR.  Extremities without edema but moderate muscle wasting.  Sensation in the toes is intact to light touch.  Cannot elicit DTRs in the lower extremities  The pt clearly has primary hypothyroidism, presumably due to Hashimoto's thyroiditis.  Would start thyroxine replacement at 50 mcg/day  More importantly:  --His dysphagia is mostly oropharyngeal.  He should be seen by Neurology to evaluate this  --Should also be seen by ENT to evaluate vocal cord function given apparent aspiration  --HIs albumin level is already quite low, and he has lost 15 lb.  He is at severe nutritional risk but is only on a clear liquid diet.  If he is allowed to have enteral supplements, would start these and assess intake over the next day or two.  Otherwise, need to strongly consider placing an NG tube (around which he can eat or drink if permitted) or even TPN

## 2023-04-16 NOTE — CHART NOTE - NSCHARTNOTEFT_GEN_A_CORE
MRI brain and cervical spine reviewed:    < from: MR Cervical Spine w/wo IV Cont (04.15.23 @ 16:25) >      IMPRESSION:    No cervical spinal cord compression. No mass.    Multilevel spondylosis with left asymmetric neural foraminal narrowings.   No significant spinal stenosis.    Anasarca.      < end of copied text >        < from: MR Brain Stereotactic w/wo IV Cont (04.15.23 @ 16:24) >      IMPRESSION:    No intracranial mass, abnormal enhancement, nor other focal pathology.      < end of copied text >        No neurosurgical intevention needed. NSGY will sign off.   Discussed with Dr. Leone

## 2023-04-17 ENCOUNTER — TRANSCRIPTION ENCOUNTER (OUTPATIENT)
Age: 68
End: 2023-04-17

## 2023-04-17 PROBLEM — Z78.9 OTHER SPECIFIED HEALTH STATUS: Chronic | Status: ACTIVE | Noted: 2023-04-13

## 2023-04-17 LAB
4/8 RATIO: 4.84 RATIO — HIGH (ref 0.86–4.14)
ABS CD8: 43 CELLS/UL — LOW (ref 90–775)
ALBUMIN SERPL ELPH-MCNC: 2.3 G/DL — LOW (ref 3.3–5)
ALP SERPL-CCNC: 48 U/L — SIGNIFICANT CHANGE UP (ref 40–120)
ALT FLD-CCNC: 88 U/L — HIGH (ref 10–45)
ANION GAP SERPL CALC-SCNC: 10 MMOL/L — SIGNIFICANT CHANGE UP (ref 5–17)
ANISOCYTOSIS BLD QL: SLIGHT — SIGNIFICANT CHANGE UP
APTT BLD: 31.4 SEC — SIGNIFICANT CHANGE UP (ref 27.5–35.5)
AST SERPL-CCNC: 180 U/L — HIGH (ref 10–40)
BASOPHILS # BLD AUTO: 0 K/UL — SIGNIFICANT CHANGE UP (ref 0–0.2)
BASOPHILS NFR BLD AUTO: 0 % — SIGNIFICANT CHANGE UP (ref 0–2)
BILIRUB SERPL-MCNC: 0.4 MG/DL — SIGNIFICANT CHANGE UP (ref 0.2–1.2)
BLD GP AB SCN SERPL QL: NEGATIVE — SIGNIFICANT CHANGE UP
BUN SERPL-MCNC: 16 MG/DL — SIGNIFICANT CHANGE UP (ref 7–23)
BURR CELLS BLD QL SMEAR: PRESENT — SIGNIFICANT CHANGE UP
CALCIUM SERPL-MCNC: 8.8 MG/DL — SIGNIFICANT CHANGE UP (ref 8.4–10.5)
CD3 BLASTS SPEC-ACNC: 254 CELLS/UL — LOW (ref 396–2024)
CD3 BLASTS SPEC-ACNC: 61 % — SIGNIFICANT CHANGE UP (ref 58–84)
CD4 %: 50 % — SIGNIFICANT CHANGE UP (ref 30–56)
CD8 %: 10 % — LOW (ref 11–43)
CHLORIDE SERPL-SCNC: 99 MMOL/L — SIGNIFICANT CHANGE UP (ref 96–108)
CK SERPL-CCNC: 2281 U/L — HIGH (ref 30–200)
CO2 SERPL-SCNC: 26 MMOL/L — SIGNIFICANT CHANGE UP (ref 22–31)
CREAT SERPL-MCNC: 1.39 MG/DL — HIGH (ref 0.5–1.3)
EGFR: 56 ML/MIN/1.73M2 — LOW
EOSINOPHIL # BLD AUTO: 0 K/UL — SIGNIFICANT CHANGE UP (ref 0–0.5)
EOSINOPHIL NFR BLD AUTO: 0 % — SIGNIFICANT CHANGE UP (ref 0–6)
GIANT PLATELETS BLD QL SMEAR: PRESENT — SIGNIFICANT CHANGE UP
GLUCOSE BLDC GLUCOMTR-MCNC: 109 MG/DL — HIGH (ref 70–99)
GLUCOSE BLDC GLUCOMTR-MCNC: 78 MG/DL — SIGNIFICANT CHANGE UP (ref 70–99)
GLUCOSE BLDC GLUCOMTR-MCNC: 82 MG/DL — SIGNIFICANT CHANGE UP (ref 70–99)
GLUCOSE SERPL-MCNC: 82 MG/DL — SIGNIFICANT CHANGE UP (ref 70–99)
HCT VFR BLD CALC: 30.2 % — LOW (ref 39–50)
HCV AB S/CO SERPL IA: 0.2 S/CO — SIGNIFICANT CHANGE UP (ref 0–0.99)
HCV AB SERPL-IMP: SIGNIFICANT CHANGE UP
HGB BLD-MCNC: 10.4 G/DL — LOW (ref 13–17)
HGB S BLD QL: NEGATIVE — SIGNIFICANT CHANGE UP
IGA FLD-MCNC: 346 MG/DL — SIGNIFICANT CHANGE UP (ref 84–499)
IGG FLD-MCNC: 3279 MG/DL — HIGH (ref 610–1660)
IGM SERPL-MCNC: 93 MG/DL — SIGNIFICANT CHANGE UP (ref 35–242)
INR BLD: 1.12 — SIGNIFICANT CHANGE UP (ref 0.88–1.16)
KAPPA LC SER QL IFE: 23.36 MG/DL — HIGH (ref 0.33–1.94)
KAPPA/LAMBDA FREE LIGHT CHAIN RATIO, SERUM: 1.97 RATIO — HIGH (ref 0.26–1.65)
LAMBDA LC SER QL IFE: 11.86 MG/DL — HIGH (ref 0.57–2.63)
LYMPHOCYTES # BLD AUTO: 0.34 K/UL — LOW (ref 1–3.3)
LYMPHOCYTES # BLD AUTO: 3.7 % — LOW (ref 13–44)
MACROCYTES BLD QL: SIGNIFICANT CHANGE UP
MAGNESIUM SERPL-MCNC: 2 MG/DL — SIGNIFICANT CHANGE UP (ref 1.6–2.6)
MANUAL SMEAR VERIFICATION: SIGNIFICANT CHANGE UP
MCHC RBC-ENTMCNC: 23.6 PG — LOW (ref 27–34)
MCHC RBC-ENTMCNC: 34.4 GM/DL — SIGNIFICANT CHANGE UP (ref 32–36)
MCV RBC AUTO: 68.5 FL — LOW (ref 80–100)
MICROCYTES BLD QL: SLIGHT — SIGNIFICANT CHANGE UP
MONOCYTES # BLD AUTO: 0.52 K/UL — SIGNIFICANT CHANGE UP (ref 0–0.9)
MONOCYTES NFR BLD AUTO: 5.6 % — SIGNIFICANT CHANGE UP (ref 2–14)
MYELOCYTES NFR BLD: 0.9 % — HIGH (ref 0–0)
NEUTROPHILS # BLD AUTO: 8.32 K/UL — HIGH (ref 1.8–7.4)
NEUTROPHILS NFR BLD AUTO: 89.8 % — HIGH (ref 43–77)
PHOSPHATE SERPL-MCNC: 3.4 MG/DL — SIGNIFICANT CHANGE UP (ref 2.5–4.5)
PLAT MORPH BLD: ABNORMAL
PLATELET # BLD AUTO: 436 K/UL — HIGH (ref 150–400)
POIKILOCYTOSIS BLD QL AUTO: SLIGHT — SIGNIFICANT CHANGE UP
POLYCHROMASIA BLD QL SMEAR: SLIGHT — SIGNIFICANT CHANGE UP
POTASSIUM SERPL-MCNC: 3.6 MMOL/L — SIGNIFICANT CHANGE UP (ref 3.5–5.3)
POTASSIUM SERPL-SCNC: 3.6 MMOL/L — SIGNIFICANT CHANGE UP (ref 3.5–5.3)
PROCALCITONIN SERPL-MCNC: 0.37 NG/ML — HIGH (ref 0.02–0.1)
PROT SERPL-MCNC: 7.5 G/DL — SIGNIFICANT CHANGE UP (ref 6–8.3)
PROTHROM AB SERPL-ACNC: 13.3 SEC — SIGNIFICANT CHANGE UP (ref 10.5–13.4)
RBC # BLD: 4.41 M/UL — SIGNIFICANT CHANGE UP (ref 4.2–5.8)
RBC # BLD: 4.41 M/UL — SIGNIFICANT CHANGE UP (ref 4.2–5.8)
RBC # FLD: 19.6 % — HIGH (ref 10.3–14.5)
RBC BLD AUTO: ABNORMAL
RETICS #: 77.9 K/UL — SIGNIFICANT CHANGE UP (ref 25–125)
RETICS/RBC NFR: 1.8 % — SIGNIFICANT CHANGE UP (ref 0.5–2.5)
RH IG SCN BLD-IMP: POSITIVE — SIGNIFICANT CHANGE UP
RHEUMATOID FACT SERPL-ACNC: <10 IU/ML — SIGNIFICANT CHANGE UP (ref 0–13)
SCHISTOCYTES BLD QL AUTO: SLIGHT — SIGNIFICANT CHANGE UP
SMUDGE CELLS # BLD: PRESENT — SIGNIFICANT CHANGE UP
SODIUM SERPL-SCNC: 135 MMOL/L — SIGNIFICANT CHANGE UP (ref 135–145)
SOLUBILITY: NEGATIVE — SIGNIFICANT CHANGE UP
SPHEROCYTES BLD QL SMEAR: SLIGHT — SIGNIFICANT CHANGE UP
T-CELL CD4 SUBSET PNL BLD: 209 CELLS/UL — LOW (ref 325–1251)
TARGETS BLD QL SMEAR: SIGNIFICANT CHANGE UP
WBC # BLD: 9.26 K/UL — SIGNIFICANT CHANGE UP (ref 3.8–10.5)
WBC # FLD AUTO: 9.26 K/UL — SIGNIFICANT CHANGE UP (ref 3.8–10.5)

## 2023-04-17 PROCEDURE — 88305 TISSUE EXAM BY PATHOLOGIST: CPT | Mod: 26

## 2023-04-17 PROCEDURE — 93306 TTE W/DOPPLER COMPLETE: CPT | Mod: 26

## 2023-04-17 PROCEDURE — 99233 SBSQ HOSP IP/OBS HIGH 50: CPT | Mod: GC

## 2023-04-17 RX ORDER — PANTOPRAZOLE SODIUM 20 MG/1
40 TABLET, DELAYED RELEASE ORAL
Refills: 0 | Status: DISCONTINUED | OUTPATIENT
Start: 2023-04-17 | End: 2023-04-19

## 2023-04-17 RX ORDER — DEXTROSE 10 % IN WATER 10 %
125 INTRAVENOUS SOLUTION INTRAVENOUS ONCE
Refills: 0 | Status: COMPLETED | OUTPATIENT
Start: 2023-04-17 | End: 2023-04-17

## 2023-04-17 RX ORDER — POTASSIUM CHLORIDE 20 MEQ
10 PACKET (EA) ORAL
Refills: 0 | Status: DISCONTINUED | OUTPATIENT
Start: 2023-04-17 | End: 2023-04-17

## 2023-04-17 RX ORDER — LEVOTHYROXINE SODIUM 125 MCG
1 TABLET ORAL
Qty: 30 | Refills: 0
Start: 2023-04-17 | End: 2023-05-16

## 2023-04-17 RX ADMIN — ENOXAPARIN SODIUM 40 MILLIGRAM(S): 100 INJECTION SUBCUTANEOUS at 19:12

## 2023-04-17 RX ADMIN — Medication 105 MILLIGRAM(S): at 15:34

## 2023-04-17 RX ADMIN — Medication 1 TABLET(S): at 12:02

## 2023-04-17 RX ADMIN — CEFTRIAXONE 100 MILLIGRAM(S): 500 INJECTION, POWDER, FOR SOLUTION INTRAMUSCULAR; INTRAVENOUS at 03:32

## 2023-04-17 RX ADMIN — Medication 500000 UNIT(S): at 06:33

## 2023-04-17 RX ADMIN — Medication 500 MILLILITER(S): at 08:30

## 2023-04-17 RX ADMIN — Medication 50 MICROGRAM(S): at 05:21

## 2023-04-17 RX ADMIN — Medication 105 MILLIGRAM(S): at 06:46

## 2023-04-17 RX ADMIN — Medication 105 MILLIGRAM(S): at 22:12

## 2023-04-17 RX ADMIN — Medication 100 MILLIEQUIVALENT(S): at 11:05

## 2023-04-17 RX ADMIN — AZITHROMYCIN 500 MILLIGRAM(S): 500 TABLET, FILM COATED ORAL at 12:02

## 2023-04-17 NOTE — CONSULT NOTE ADULT - ASSESSMENT
Assessment: 67yo  male with no significant PMH presenting with several months of dysphagia, digit swelling, weightloss and generalized weakness. Rheumatology consulted for evaluation of dysphagia in the setting of possible mixed connective tissue disease    #Dysphagia Assessment: 67yo  male with no significant PMH presenting with several months of dysphagia, digit swelling, weightloss and generalized weakness. Rheumatology consulted for evaluation of dysphagia in the setting of possible mixed connective tissue disease    #Dysphagia  - History of progressively worsening dysphagia, digit swelling and weight loss. CT revealing mildly dilated esophagus in proximal and mid esophagus. s/p esophagram noting prominent cricopharyngeal bar. Pending EGD with GI today 4/17  - Rheumatology consulted for evaluation of mixed connective tissue disease in setting of elevated Anti-RNP, with dysphagia, arthralgia, myositis  - Physical exam notable for skin thickening, digital swelling and nailbed discoloration,   - Rheumatology labs siginificant for elevated ESR, CRP, CK and Anti-RNP  - Pending TERE  - Obtain Myomarker Panel 3 Plus (will take 3 weeks for results)    Case discussed with Dr. Hodges. Primary team updated.    Assessment: 67yo  male with no significant PMH presenting with several months of dysphagia, digit swelling, weight loss and generalized weakness. Rheumatology consulted for evaluation of dysphagia in the setting of possible mixed connective tissue disease    #Dysphagia  - History of progressively worsening dysphagia, digit swelling and weight loss. CT revealing mildly dilated esophagus in proximal and mid esophagus. s/p esophagram noting prominent cricopharyngeal bar. Pending EGD with GI today 4/17  - Rheumatology consulted for evaluation of mixed connective tissue disease in setting of elevated Anti-RNP, with dysphagia, arthralgia, myositis  - Physical exam notable for skin thickening, digital swelling and nailbed discoloration,   - Rheumatology labs significant for elevated ESR, CRP, CK and Anti-RNP  - Pending TERE, dsdna,   - please obtain Myomarker Panel 3 Plus  -would recommend neurology eval for possible emg as well    Attending Note:  Case discussed with resident, awaiting endoscopy today, possible MCTD vs sclerodermatous changes with overlap features, vs possible paraneoplastic, awaiting further serologies, myomarker panel, and further evaluation of testicular mass.  Would recommend neurology evaluation for possible emg as well.

## 2023-04-17 NOTE — CONSULT NOTE ADULT - SUBJECTIVE AND OBJECTIVE BOX
HPI:  67yo M here w/ c/o generalized weakness, body pain, and unable to swallow x 6 months with a gradual 50lb weight loss. All of his issues started 1 year ago per pt he started to have lower extremity swelling, was started on lasix and subsequently lost to f/u so stopped his lasix and then started to have facial swelling 4 months ago. He said he had a sonogram of his heart and was told something was abnormal but does not remember what.  After that he had difficulty swallowing solid foods which has progressed to sometimes thin foods as well. He saw his gastroenterologist Dr Meier who told him he was having aspiration but he never had imaging or work up. He took antibiotics for 'aspiration' but said that they did not help because he never had much of a cough or fever.  He was sent to Dr. Quiñones today but at appt recommended to come to the ED for inpatient workup. He has been having falls about 3 for the past few months. States had spine imaging that he did not bring of his thoracic spine that was totally normal, weakness attributed to dehydation/weight loss not spinal. Pt denies any fever, chills, n/v, diarrhea/ constipation.    Rheumatology history: Patient is 69 yo  Male with no significant PMH p/w generalized weakness, swelling in his hands, weight loss (~15lb over the past 2 months) and dysphagia for the past ~3 months. He was experiencing difficulty swallowing solid foods which had progressed to sometimes thin foods as well. He saw his gastroenterologist Dr Meier who told him he was having aspiration but he never had imaging or work up. He took antibiotics for 'aspiration' but said that they did not help because he never had much of a cough or fever.  He was sent to Dr. Quiñones today but at appt recommended to come to the ED for inpatient workup. Rest of history as mentioned in the HPI. Patient has not been evaluated outpatient by rheumatology previously, and denies family history of any rheutalogical processes. He was evaluated by speech & swallow and underwent esophagogram significant for prominent CP bar, pending EGD today. ROS over past few months positive for lymphadenopathy, weight loss, dysphagia, digital swelling and cramping not associated with temperature change, occaisional joint pain, dyspnea on exertion, weakness getting out of bed and lifting arms.     Physical exam notable for skin thickening of hands, forearms, neck and lower extremities with, puffy swollen fingers and nonpitting edema of hands, muscle weakness of bilateral upper extremities unable to lift above head.  Laboratory workup significant for elevated ESR, CRP, CK, Anti-Ribonuclearprotein.     Regarding medical workup this admission, he is pending EGD by GI today. US of his scrotum remarkable for a 1.7 cm hypervascular solid lesion in the right testicle suspicious for neoplasm. CT imaging didn't show any evidence of metastases. Urology was consulted, planned for outpatient orchiectomy. Oncology following who stated that there is also suspicion for lymphoma given his are; however, syphilis and TB are also in the differential. The patient was noted to have abnormal thyroid function tests with TSH of 18.8 and FT4 of 0.71, Endocrinology following.         VITAL SIGNS:  Vital Signs Last 24 Hrs  T(C): 36.3 (17 Apr 2023 05:50), Max: 36.4 (16 Apr 2023 13:12)  T(F): 97.4 (17 Apr 2023 05:50), Max: 97.6 (16 Apr 2023 21:28)  HR: 85 (17 Apr 2023 05:50) (85 - 105)  BP: 118/71 (17 Apr 2023 05:50) (118/71 - 122/82)  BP(mean): --  RR: 17 (17 Apr 2023 05:50) (17 - 18)  SpO2: 95% (17 Apr 2023 05:50) (94% - 95%)    Parameters below as of 17 Apr 2023 05:50  Patient On (Oxygen Delivery Method): room air      I&O's Summary      PHYSICAL EXAM:    General: ill-appearing  HEENT: NC/AT; PERRL, anicteric sclera; MMM; neck folds  Neck: supple  Cardiovascular: +S1/S2; RRR  Respiratory: CTA B/L; no W/R/R  Gastrointestinal: soft, NT/ND; +BSx4  Extremities: WWP; nonpitting edema of b/l UE and digit swelling  Vascular: 2+ radial, DP/PT pulses B/L  Neurological: AAOx3; sensation intact throughout, 5/5 strength b/l LE    MEDICATIONS:  MEDICATIONS  (STANDING):  azithromycin   Tablet 500 milliGRAM(s) Oral daily  cefTRIAXone   IVPB 2000 milliGRAM(s) IV Intermittent every 24 hours  enoxaparin Injectable 40 milliGRAM(s) SubCutaneous every 24 hours  levothyroxine 50 MICROGram(s) Oral daily  Nephro-james 1 Tablet(s) Oral daily  nystatin    Suspension 849619 Unit(s) Oral every 24 hours  potassium chloride  10 mEq/100 mL IVPB 10 milliEquivalent(s) IV Intermittent every 1 hour  thiamine IVPB 500 milliGRAM(s) IV Intermittent every 8 hours    MEDICATIONS  (PRN):      ALLERGIES:  Allergies    No Known Allergies    Intolerances        LABS:                        10.4   9.26  )-----------( 436      ( 17 Apr 2023 07:39 )             30.2     04-17    135  |  99  |  16  ----------------------------<  82  3.6   |  26  |  1.39<H>    Ca    8.8      17 Apr 2023 07:39  Phos  3.4     04-17  Mg     2.0     04-17    TPro  7.5  /  Alb  2.3<L>  /  TBili  0.4  /  DBili  x   /  AST  180<H>  /  ALT  88<H>  /  AlkPhos  48  04-17    PT/INR - ( 17 Apr 2023 07:39 )   PT: 13.3 sec;   INR: 1.12          PTT - ( 17 Apr 2023 07:39 )  PTT:31.4 sec    CAPILLARY BLOOD GLUCOSE      POCT Blood Glucose.: 109 mg/dL (17 Apr 2023 08:57)      RADIOLOGY & ADDITIONAL TESTS: Reviewed.   HPI:  69yo M here w/ c/o generalized weakness, body pain, and unable to swallow x 6 months with a gradual 50lb weight loss. All of his issues started 1 year ago per pt he started to have lower extremity swelling, was started on lasix and subsequently lost to f/u so stopped his lasix and then started to have facial swelling 4 months ago. He said he had a sonogram of his heart and was told something was abnormal but does not remember what.  After that he had difficulty swallowing solid foods which has progressed to sometimes thin foods as well. He saw his gastroenterologist Dr Meier who told him he was having aspiration but he never had imaging or work up. He took antibiotics for 'aspiration' but said that they did not help because he never had much of a cough or fever.  He was sent to Dr. Quiñones today but at appt recommended to come to the ED for inpatient workup. He has been having falls about 3 for the past few months. States had spine imaging that he did not bring of his thoracic spine that was totally normal, weakness attributed to dehydation/weight loss not spinal. Pt denies any fever, chills, n/v, diarrhea/ constipation.    Rheumatology history: Patient is 67 yo  Male with no significant PMH p/w generalized weakness, swelling in his hands, weight loss (~15lb over the past 2 months) and dysphagia for the past ~3 months. He was experiencing difficulty swallowing solid foods which had progressed to sometimes thin foods as well. He saw his gastroenterologist Dr Meier who told him he was having aspiration but he never had imaging or work up. He took antibiotics for 'aspiration' but said that they did not help because he never had much of a cough or fever.  He was sent to Dr. Quiñones today but at appt recommended to come to the ED for inpatient workup. Rest of history as mentioned in the HPI. Patient has not been evaluated outpatient by rheumatology previously, and denies family history of any rheutalogical processes. He was evaluated by speech & swallow and underwent esophagogram significant for prominent CP bar, pending EGD today. ROS over past few months positive for lymphadenopathy, weight loss, dysphagia, digital swelling and cramping not associated with temperature change, occaisional joint pain, dyspnea on exertion, weakness getting out of bed and lifting arms.     Physical exam notable for skin thickening of hands, forearms, neck and lower extremities with, puffy swollen fingers and nonpitting edema of hands, muscle weakness of bilateral upper extremities unable to lift above head.  Laboratory workup significant for elevated ESR, CRP, CK, Anti-RNP     Regarding medical workup this admission, he is pending EGD by GI today. US of his scrotum remarkable for a 1.7 cm hypervascular solid lesion in the right testicle suspicious for neoplasm. CT imaging didn't show any evidence of metastases. Urology was consulted, planned for outpatient orchiectomy. Oncology following who stated that there is also suspicion for lymphoma given his are; however, syphilis and TB are also in the differential. The patient was noted to have abnormal thyroid function tests with TSH of 18.8 and FT4 of 0.71, Endocrinology following.     VITAL SIGNS:  Vital Signs Last 24 Hrs  T(C): 36.3 (17 Apr 2023 05:50), Max: 36.4 (16 Apr 2023 13:12)  T(F): 97.4 (17 Apr 2023 05:50), Max: 97.6 (16 Apr 2023 21:28)  HR: 85 (17 Apr 2023 05:50) (85 - 105)  BP: 118/71 (17 Apr 2023 05:50) (118/71 - 122/82)  BP(mean): --  RR: 17 (17 Apr 2023 05:50) (17 - 18)  SpO2: 95% (17 Apr 2023 05:50) (94% - 95%)    Parameters below as of 17 Apr 2023 05:50  Patient On (Oxygen Delivery Method): room air      I&O's Summary      PHYSICAL EXAM:    General: ill-appearing  HEENT: NC/AT; PERRL, anicteric sclera; MMM; neck folds  Neck: supple  Cardiovascular: +S1/S2; RRR  Respiratory: CTA B/L; no W/R/R  Gastrointestinal: soft, NT/ND; +BSx4  Extremities: WWP; nonpitting edema of b/l UE and digit swelling  Vascular: 2+ radial, DP/PT pulses B/L  Neurological: AAOx3; sensation intact throughout, 5/5 strength b/l LE    MEDICATIONS:  MEDICATIONS  (STANDING):  azithromycin   Tablet 500 milliGRAM(s) Oral daily  cefTRIAXone   IVPB 2000 milliGRAM(s) IV Intermittent every 24 hours  enoxaparin Injectable 40 milliGRAM(s) SubCutaneous every 24 hours  levothyroxine 50 MICROGram(s) Oral daily  Nephro-james 1 Tablet(s) Oral daily  nystatin    Suspension [id number] Unit(s) Oral every 24 hours  potassium chloride  10 mEq/100 mL IVPB 10 milliEquivalent(s) IV Intermittent every 1 hour  thiamine IVPB 500 milliGRAM(s) IV Intermittent every 8 hours    MEDICATIONS  (PRN):      ALLERGIES:  Allergies    No Known Allergies    Intolerances        LABS:                        10.4   9.26  )-----------( 436      ( 17 Apr 2023 07:39 )             30.2     04-17    135  |  99  |  16  ----------------------------<  82  3.6   |  26  |  1.39<H>    Ca    8.8      17 Apr 2023 07:39  Phos  3.4     04-17  Mg     2.0     04-17    TPro  7.5  /  Alb  2.3<L>  /  TBili  0.4  /  DBili  x   /  AST  180<H>  /  ALT  88<H>  /  AlkPhos  48  04-17    PT/INR - ( 17 Apr 2023 07:39 )   PT: 13.3 sec;   INR: 1.12          PTT - ( 17 Apr 2023 07:39 )  PTT:31.4 sec    CAPILLARY BLOOD GLUCOSE      POCT Blood Glucose.: 109 mg/dL (17 Apr 2023 08:57)      RADIOLOGY & ADDITIONAL TESTS: Reviewed.

## 2023-04-17 NOTE — CHART NOTE - NSCHARTNOTEFT_GEN_A_CORE
Patient is s/p EGD performed in Endoscopy    Findings:    Esophagus  A small size hiatal hernia was seen. Retroflexion view in the stomach confirmed the size and morphology of the hernia.  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 The Z-line was at 40 cm from the incisors and the upper end of the gastric folds started at 42 cm from the incisors. Additional findings include tongue(s) of salmon epithelium that extended upward from above the top of the gastric folds. Cold forceps targeted only biopsies were performed for histology in the lower third of the esophagus.  A small narrowing was noted just distal to UES, easily passed with gastroscope. A 12 mm wire guided polyvinyl dilator was introduced for dilation and the diameter was progressively increased to 14 mm successfully.    Stomach  Abnormal mucosa was noted in the pre-pyloric region. Multiple cold forceps biopsies were performed for histology in the pre-pyloric region.    Duodenum  Normal mucosa was noted in the whole examined duodenum. Random mucosal biopsies were obtained to evaluate for histologic features of celiac disease. Multiple cold forceps biopsies were performed for histology in the second part of the duodenum    Plan:	  Full Liquid Diet  Await pathology results.  Return to floor for further management  PPI bid    Bora Cam M.D.  Gastroenterology Fellow  Weekday Pager: [phone]  Weeknights/Weekend Coverage: Please Call the  for contact info

## 2023-04-17 NOTE — DISCHARGE NOTE PROVIDER - DETAILS OF MALNUTRITION DIAGNOSIS/DIAGNOSES
This patient has been assessed with a concern for Malnutrition and was treated during this hospitalization for the following Nutrition diagnosis/diagnoses:     -  04/18/2023: Severe protein-calorie malnutrition

## 2023-04-17 NOTE — PRE-ANESTHESIA EVALUATION ADULT - NSANTHPMHFT_GEN_ALL_CORE
67yo M here w/ c/o generalized weakness, body pain, and unable to swallow x 6 months with a gradual 50lb weight loss. All of his issues started 1 year ago per pt he started to have lower extremity swelling, was started on lasix and subsequently lost to f/u so stopped his lasix and then started to have facial swelling 4 months ago. He said he had a sonogram of his heart and was told something was abnormal but does not remember what.  After that he had difficulty swallowing solid foods which has progressed to sometimes thin foods as well. He saw his gastroenterologist Dr Meier who told him he was having aspiration but he never had imaging or work up. He took antibiotics for 'aspiration' but said that they did not help because he never had much of a cough or fever.  He was sent to Dr. Quiñones today but at appt recommended to come to the ED for inpatient workup. He has been having falls about 3 for the past few months. States had spine imaging that he did not bring of his thoracic spine that was totally normal, weakness attributed to dehydation/weight loss not spinal. Pt denies any fever, chills, n/v, diarrhea/ constipation.    Rheumatology history: Patient is 67 yo  Male with no significant PMH p/w generalized weakness, swelling in his hands, weight loss (~15lb over the past 2 months) and dysphagia for the past ~3 months. He was experiencing difficulty swallowing solid foods which had progressed to sometimes thin foods as well. He saw his gastroenterologist Dr Meier who told him he was having aspiration but he never had imaging or work up. He took antibiotics for 'aspiration' but said that they did not help because he never had much of a cough or fever.  He was sent to Dr. Quiñones today but at appt recommended to come to the ED for inpatient workup. Rest of history as mentioned in the HPI. Patient has not been evaluated outpatient by rheumatology previously, and denies family history of any rheutalogical processes. He was evaluated by speech & swallow and underwent esophagogram significant for prominent CP bar, pending EGD today. ROS over past few months positive for lymphadenopathy, weight loss, dysphagia, digital swelling and cramping not associated with temperature change, occaisional joint pain, dyspnea on exertion, weakness getting out of bed and lifting arms.

## 2023-04-17 NOTE — PRE-ANESTHESIA EVALUATION ADULT - NSANTHOSAYNRD_GEN_A_CORE
No. DAVIDSON screening performed.  STOP BANG Legend: 0-2 = LOW Risk; 3-4 = INTERMEDIATE Risk; 5-8 = HIGH Risk

## 2023-04-17 NOTE — DISCHARGE NOTE PROVIDER - ATTENDING DISCHARGE PHYSICAL EXAMINATION:
Patient was seen and examined at bedside on 4/19/2023 at 11 am. Patient feels well. Denies pain, SOB, CP. ROS is otherwise negative. Vitals, labwork and pertinent imaging reviewed. Physical exam - NAD, AAO x 4, PERRLA, EOMI, MMM, supple neck, chest - CTA b/l, CV - rrr, s1s2, no m/r/g, abd - soft, NTND, + BS, ext - wwp, RLE foot wrapped, psych - normal affect, skin - no rash; pt appears cachectic and malnourished    Plan:  -Unclear true etiology but suspect MCTD  -PT rec home PT  -Will need close outpatient f/u

## 2023-04-17 NOTE — PROGRESS NOTE ADULT - ATTENDING COMMENTS
Reports feeling somewhat improved, no report of diarrhea. Awaiting EGD, no other complaints or events reported.  Labs, imaging reviewed    Follow-up with rheumatology  Follow-up EGD results, will advance diet as tolerated  Urology and Hem/Onc follow-up as outpatient  Appreciate Endocrinology

## 2023-04-17 NOTE — DISCHARGE NOTE PROVIDER - NSDCCPCAREPLAN_GEN_ALL_CORE_FT
PRINCIPAL DISCHARGE DIAGNOSIS  Diagnosis: Dysphagia  Assessment and Plan of Treatment: Dysphagia is trouble swallowing. This condition occurs when solids and liquids stick in a person's throat on the way down to the stomach, or when food takes longer to get to the stomach than usual.  You may have problems swallowing food, liquids, or both. You may also have pain while trying to swallow. It may take you more time and effort to swallow something.  How is this treated?  Treatment for dysphagia depends on the cause of this condition:  If the dysphagia is caused by acid reflux or infection, medicines may be used. These may include antibiotics or heartburn medicines.  If the dysphagia is caused by problems with the muscles, swallowing therapy may be used to help you strengthen your swallowing muscles. You may have to do specific exercises to strengthen the muscles or stretch them.  If the dysphagia is caused by a blockage or mass, procedures to remove the blockage may be done. You may need surgery and a feeding tube.  You may need to make diet changes. Ask your health care provider for specific instructions.  You have received an Endoscopy with biopsy while in the hospital to determine the cause of the difficulty swallowing. Please ensure that you follow up with gastroenyterology and rheumatology for your weakness and difficulty swallowing      SECONDARY DISCHARGE DIAGNOSES  Diagnosis: Weakness  Assessment and Plan of Treatment: Weakness is a lack of strength. You may feel weak all over your body (generalized), or you may feel weak in one part of your body (focal). There are many potential causes of weakness. Sometimes, the cause of your weakness may not be known. Some causes of weakness can be serious, so it is important to see your doctor.  Follow these instructions at home:  Activity  Rest as needed.  Try to get enough sleep. Most adults need 7–8 hours of sleep each night. Talk to your doctor about how much sleep you need.  Do exercises, such as arm curls and leg raises, for 30 minutes at least 2 days a week or as told by your doctor.  Think about working with a physical therapist or  to help you get stronger.  General instructions  A plate with examples of foods in a healthy diet.  Take over-the-counter and prescription medicines only as told by your doctor.  Eat a healthy, well-balanced diet. This includes:  Proteins to build muscles, such as lean meats and fish.  Fresh fruits and vegetables.  Carbohydrates to boost energy, such as whole grains.  Drink enough fluid to keep your pee (urine) pale yellow  Please ensure that you follow up with Dr. Hodges and Dr. Rae from Rheumatology and Gastroenterology        PRINCIPAL DISCHARGE DIAGNOSIS  Diagnosis: Dysphagia  Assessment and Plan of Treatment: Dysphagia is trouble swallowing. This condition occurs when solids and liquids stick in a person's throat on the way down to the stomach, or when food takes longer to get to the stomach than usual.  You may have problems swallowing food, liquids, or both. You may also have pain while trying to swallow. It may take you more time and effort to swallow something.  How is this treated?  Treatment for dysphagia depends on the cause of this condition:  If the dysphagia is caused by acid reflux or infection, medicines may be used. These may include antibiotics or heartburn medicines.  If the dysphagia is caused by problems with the muscles, swallowing therapy may be used to help you strengthen your swallowing muscles. You may have to do specific exercises to strengthen the muscles or stretch them.  If the dysphagia is caused by a blockage or mass, procedures to remove the blockage may be done. You may need surgery and a feeding tube.  You may need to make diet changes. Ask your health care provider for specific instructions.  You have received an Endoscopy with biopsy while in the hospital to determine the cause of the difficulty swallowing and the results were all normal. Please ensure that you follow up with rheumatology, neurology, oncology, and primary care doctor for your weakness and difficulty swallowing. Continue advancing your diet as tolerated, as discussed.      SECONDARY DISCHARGE DIAGNOSES  Diagnosis: Weakness  Assessment and Plan of Treatment: Weakness is a lack of strength. You may feel weak all over your body (generalized), or you may feel weak in one part of your body (focal). There are many potential causes of weakness. Sometimes, the cause of your weakness may not be known. Some causes of weakness can be serious, so it is important to see your doctor.  Follow these instructions at home:  Activity  Rest as needed.  Try to get enough sleep. Most adults need 7–8 hours of sleep each night. Talk to your doctor about how much sleep you need.  Do exercises, such as arm curls and leg raises, for 30 minutes at least 2 days a week or as told by your doctor.  Think about working with a physical therapist or  to help you get stronger.  General instructions  A plate with examples of foods in a healthy diet.  Take over-the-counter and prescription medicines only as told by your doctor.  Eat a healthy, well-balanced diet. This includes:  Proteins to build muscles, such as lean meats and fish.  Fresh fruits and vegetables.  Carbohydrates to boost energy, such as whole grains.  Drink enough fluid to keep your pee (urine) pale yellow  Please ensure that you follow up with Dr. Hodges and Dr. Rae from Rheumatology and Gastroenterology. Also follow up with Dr. Reyes from Neurology and Dr. Miles from oncology.       Diagnosis: Testicular mass  Assessment and Plan of Treatment: You were found to have a testicular mass on CT scan. Urology saw you in the hospital and they recommended removal of your testicle outpatient. Please follow up with your Urologist Dr. Du.    Diagnosis: H/O sinus tachycardia  Assessment and Plan of Treatment: You were taking a beta blocker called Lopressor and Hydrochlorothiazide prescribed by your primary care doctor Dr. Meyer. We held those medications since your blood pressure and heart rate were normal in the hospital. We recommend discussing whether or not you need to continue these medications with your doctor during your appointment on Monday 4/24/23 at 12:00PM. In the mean time, continue holding medications.

## 2023-04-17 NOTE — DISCHARGE NOTE PROVIDER - NPI NUMBER (FOR [employer] USE ONLY) :
[[id number]] [[id number]],[[id number]],[[id number]],[[id number]] [[id number]],[[id number]],[[id number]],[[id number]],[[id number]] [[id number]],[[id number]],[[id number]]

## 2023-04-17 NOTE — DISCHARGE NOTE PROVIDER - NSDCFUADDAPPT_GEN_ALL_CORE_FT
- Please contact [phone] to set up an appointment with the Endocrinology doctors for your hypothyroidism   -   - Please contact [phone] to set up an appointment with the Endocrinology doctors for your hypothyroidism   - Dr. Hodges's (the Rheumatologist) office will reach out to you to schedule a follow up appointment in her clinic  - Dr. Rae's (Gastroenterologist) office will reach out to you to schedule a follow up appointment  - Please contact [phone] to set up an appointment with the Endocrinology doctors for your hypothyroidism.  - Please follow up with Dr. Loren Hodges (Rheumatology) on 4/26/23 at 12:00PM.  - Please follow up with Dr. Lynda Du (Urology) on 5/18/23 at 9:30AM.   - Please follow up with Dr. Kalyn Miles (Hematology/Oncology) on 5/10/23 at 3:00PM.   - Dr. Marbin Rae's (Gastroenterology) office will reach out to you to schedule a follow up appointment  - Please contact [facility] Endocrinology at [phone] to set up an appointment with the Endocrinology doctor for your hypothyroidism.  - Please follow up with Dr. Loren Hodges (Rheumatology) on 4/26/23 at 12:00PM.  - Please follow up with Dr. Lynda Du (Urology) on 5/18/23 at 9:30AM.   - Please follow up with Dr. Kalyn Miles (Hematology/Oncology) on 5/10/23 at 3:00PM.   - Dr. Marbin Rae's (Gastroenterology) office will reach out to you to schedule a follow up appointment  Please follow up with your primary care doctor Dr. Ruperto Meyer on Monday 4/24/23 at 12:00PM.     Please follow up with Dr. Loren Hodges (Rheumatology) on 4/26/23 at 12:00PM.    Please follow up with Dr. Kalyn Miles (Hematology/Oncology) on 5/10/23 at 3:00PM.     Please follow up with Dr. Lynda Du (Urology) on 5/18/23 at 9:30AM.     Please contact [facility] Endocrinology at [phone] to set up an appointment with the Endocrinology doctor for your hypothyroidism, as discussed.    Dr. Marbin Rae's (Gastroenterology) office will reach out to you to schedule a follow up appointment. Please follow up with Dr. Dixon Reyes (Neurology) on 4/21/23 at 3:00PM.     Please follow up with your primary care DrBrinda Meyer on 4/24/23 at 12:00PM.    Please follow up with Dr. Loren Hodges (Rheumatology) on 4/26/23 at 12:00PM.    Please follow up with Dr. Kalyn Miles (Hematology/Oncology) on 5/10/23 at 3:00PM.     Please follow up with Dr. Lynda Du (Urology) on 5/18/23 at 9:30AM.     Please contact [facility] Endocrinology at [phone] to set up an appointment with the Endocrinology doctor for your hypothyroidism, as discussed.    Dr. Marbin Rae's (Gastroenterology) office will reach out to you to schedule a follow up appointment.

## 2023-04-17 NOTE — PROGRESS NOTE ADULT - SUBJECTIVE AND OBJECTIVE BOX
OVERNIGHT EVENTS:    SUBJECTIVE / INTERVAL HPI: Patient seen and examined at bedside.     VITAL SIGNS:  Vital Signs Last 24 Hrs  T(C): 36.3 (17 Apr 2023 05:50), Max: 36.4 (16 Apr 2023 13:12)  T(F): 97.4 (17 Apr 2023 05:50), Max: 97.6 (16 Apr 2023 21:28)  HR: 85 (17 Apr 2023 05:50) (85 - 105)  BP: 118/71 (17 Apr 2023 05:50) (118/71 - 122/82)  BP(mean): --  RR: 17 (17 Apr 2023 05:50) (17 - 18)  SpO2: 95% (17 Apr 2023 05:50) (94% - 95%)    Parameters below as of 17 Apr 2023 05:50  Patient On (Oxygen Delivery Method): room air        PHYSICAL EXAM:    General: WDWN  HEENT: NCAT; PERRL, anicteric sclera; MMM  Neck: supple, trachea midline  Cardiovascular: S1, S2 normal; RRR, no M/G/R  Respiratory: CTABL; no W/R/R  Gastrointestinal: soft, nontender, nondistended. bowel sounds present.  Skin: no ulcerations or visible rashes appreciated  Extremities: WWP; no edema, clubbing or cyanosis  Vascular: 2+ radial, DP/PT pulses B/L  Neurological: AAOx3; CN II-XII grossly intact; no focal deficits    MEDICATIONS:  MEDICATIONS  (STANDING):  azithromycin   Tablet 500 milliGRAM(s) Oral daily  cefTRIAXone   IVPB 2000 milliGRAM(s) IV Intermittent every 24 hours  enoxaparin Injectable 40 milliGRAM(s) SubCutaneous every 24 hours  levothyroxine 50 MICROGram(s) Oral daily  Nephro-james 1 Tablet(s) Oral daily  nystatin    Suspension 869069 Unit(s) Oral every 24 hours  potassium chloride  10 mEq/100 mL IVPB 10 milliEquivalent(s) IV Intermittent every 1 hour  thiamine IVPB 500 milliGRAM(s) IV Intermittent every 8 hours    MEDICATIONS  (PRN):      ALLERGIES:  Allergies    No Known Allergies    Intolerances        LABS:                        10.4   9.26  )-----------( 436      ( 17 Apr 2023 07:39 )             30.2     04-17    135  |  99  |  16  ----------------------------<  82  3.6   |  26  |  1.39<H>    Ca    8.8      17 Apr 2023 07:39  Phos  3.4     04-17  Mg     2.0     04-17    TPro  7.5  /  Alb  2.3<L>  /  TBili  0.4  /  DBili  x   /  AST  180<H>  /  ALT  88<H>  /  AlkPhos  48  04-17    PT/INR - ( 17 Apr 2023 07:39 )   PT: 13.3 sec;   INR: 1.12          PTT - ( 17 Apr 2023 07:39 )  PTT:31.4 sec    CAPILLARY BLOOD GLUCOSE      POCT Blood Glucose.: 109 mg/dL (17 Apr 2023 08:57)      RADIOLOGY & ADDITIONAL TESTS: Reviewed. OVERNIGHT EVENTS: DAHIANA    SUBJECTIVE / INTERVAL HPI: Patient reports 10 episodes of diarrhea since yesterday. No nausea, vomiting, abdominal pain. Denies fevers, chills, is urinating without pain     VITAL SIGNS:  Vital Signs Last 24 Hrs  T(C): 36.3 (17 Apr 2023 05:50), Max: 36.4 (16 Apr 2023 13:12)  T(F): 97.4 (17 Apr 2023 05:50), Max: 97.6 (16 Apr 2023 21:28)  HR: 85 (17 Apr 2023 05:50) (85 - 105)  BP: 118/71 (17 Apr 2023 05:50) (118/71 - 122/82)  BP(mean): --  RR: 17 (17 Apr 2023 05:50) (17 - 18)  SpO2: 95% (17 Apr 2023 05:50) (94% - 95%)    Parameters below as of 17 Apr 2023 05:50  Patient On (Oxygen Delivery Method): room air        PHYSICAL EXAM:  Gen: Lying in bed and in no acute distress  HEENT: MMM, clear OP  Neck: trachea at midline  CV: RRR, +S1/S2  Pulm: Clear lung bilaterally, no wheezes, rales or rhonchi   Abd: soft, NT/ND  Skin: warm and dry,   Ext: no LE edema   Neuro: AOx3, speaking in full sentences    MEDICATIONS:  MEDICATIONS  (STANDING):  azithromycin   Tablet 500 milliGRAM(s) Oral daily  cefTRIAXone   IVPB 2000 milliGRAM(s) IV Intermittent every 24 hours  enoxaparin Injectable 40 milliGRAM(s) SubCutaneous every 24 hours  levothyroxine 50 MICROGram(s) Oral daily  Nephro-james 1 Tablet(s) Oral daily  nystatin    Suspension 766216 Unit(s) Oral every 24 hours  potassium chloride  10 mEq/100 mL IVPB 10 milliEquivalent(s) IV Intermittent every 1 hour  thiamine IVPB 500 milliGRAM(s) IV Intermittent every 8 hours    MEDICATIONS  (PRN):      ALLERGIES:  Allergies    No Known Allergies    Intolerances        LABS:                        10.4   9.26  )-----------( 436      ( 17 Apr 2023 07:39 )             30.2     04-17    135  |  99  |  16  ----------------------------<  82  3.6   |  26  |  1.39<H>    Ca    8.8      17 Apr 2023 07:39  Phos  3.4     04-17  Mg     2.0     04-17    TPro  7.5  /  Alb  2.3<L>  /  TBili  0.4  /  DBili  x   /  AST  180<H>  /  ALT  88<H>  /  AlkPhos  48  04-17    PT/INR - ( 17 Apr 2023 07:39 )   PT: 13.3 sec;   INR: 1.12          PTT - ( 17 Apr 2023 07:39 )  PTT:31.4 sec    CAPILLARY BLOOD GLUCOSE      POCT Blood Glucose.: 109 mg/dL (17 Apr 2023 08:57)      RADIOLOGY & ADDITIONAL TESTS: Reviewed.

## 2023-04-17 NOTE — PROGRESS NOTE ADULT - PROBLEM SELECTOR PLAN 4
Patient with CT showing 1.1 cm rim-enhancing hypodense lesion in the posterior left parietal lobe seen only on axial images on CT soft tissue of neck prelim read, but not on CTH prelim read. Ddx possible metastasis from malignancy, less likely infectious.  - consult neurosurgery for brain lesion and possible neck mass  - f/u final reads of imaging  - MRI brain and C spine w/ w/o contrast  - f/u HIV if pt consents Patient with CT showing 1.1 cm rim-enhancing hypodense lesion in the posterior left parietal lobe seen only on axial images on CT soft tissue of neck prelim read, but not on CTH prelim read. Ddx possible metastasis from malignancy, less likely infectious.  MRI brain:   No intracranial mass, abnormal enhancement, nor other focal pathology.  MRI C Spine:   No cervical spinal cord compression. No mass.  - NSG signed off. No interventions planned

## 2023-04-17 NOTE — PROGRESS NOTE ADULT - ASSESSMENT
67yo male with no sig PMH PSH history presents with worsening weight loss, dysphagia, weakness and fatigue. Afebrile, HDS, exam significant for cachexia, abdomen soft, right testicular mass. Labs with WBC 12k, elevated TSH. CT demonstrating fluid level in esophagus, right testicular mass and concern for brain lesion. Differential includes metastatic neoplasm, unlikely esophageal, potentially testicular primary. Patient currently poorly nutritionally optimized.     -no acute surgical intervention  -tentative scope w/GI 4/17 - appreciate recommendations and pending result  -S&S/MBSS results appreciated  -NSGY/MRI - no intracranial mass/pathology  -Urology - recommendations appreciated  -Care per primary team  -Surgery Team 2C will continue to follow. Please page Team 2 with questions/clinical changes. [phone]

## 2023-04-17 NOTE — DISCHARGE NOTE PROVIDER - NSDCMRMEDTOKEN_GEN_ALL_CORE_FT
levothyroxine 50 mcg (0.05 mg) oral tablet: 1 tab(s) orally once a day   levothyroxine 50 mcg (0.05 mg) oral tablet: 1 tab(s) orally once a day  pantoprazole 40 mg oral delayed release tablet: 1 tab(s) orally once a day (before a meal)

## 2023-04-17 NOTE — DISCHARGE NOTE PROVIDER - PROVIDER TOKENS
PROVIDER:[TOKEN:[[id number]],SCHEDULEDAPPT:[05/18/2023],SCHEDULEDAPPTTIME:[09:30 AM]],PROVIDER:[TOKEN:[[id number]]],PROVIDER:[TOKEN:[[id number]],FOLLOWUP:[1 month]],PROVIDER:[TOKEN:[[id number]]] PROVIDER:[TOKEN:[[id number]],SCHEDULEDAPPT:[05/18/2023],SCHEDULEDAPPTTIME:[09:30 AM]],PROVIDER:[TOKEN:[[id number]]],PROVIDER:[TOKEN:[[id number]],FOLLOWUP:[1 month]],PROVIDER:[TOKEN:[[id number]]],PROVIDER:[TOKEN:[[id number]],FOLLOWUP:[2 weeks],ESTABLISHEDPATIENT:[T]] PROVIDER:[TOKEN:[[id number]],SCHEDULEDAPPT:[05/18/2023],SCHEDULEDAPPTTIME:[09:30 AM]],PROVIDER:[TOKEN:[[id number]]],PROVIDER:[TOKEN:[[id number]],SCHEDULEDAPPT:[04/26/2023],SCHEDULEDAPPTTIME:[12:00 PM]],PROVIDER:[TOKEN:[[id number]:[id number]]],PROVIDER:[TOKEN:[[id number]],SCHEDULEDAPPT:[05/10/2023],SCHEDULEDAPPTTIME:[03:00 PM],ESTABLISHEDPATIENT:[T]] PROVIDER:[TOKEN:[[id number]],SCHEDULEDAPPT:[05/18/2023],SCHEDULEDAPPTTIME:[09:30 AM]],PROVIDER:[TOKEN:[[id number]]],PROVIDER:[TOKEN:[[id number]],SCHEDULEDAPPT:[04/26/2023],SCHEDULEDAPPTTIME:[12:00 PM]],PROVIDER:[TOKEN:[[id number]:[id number]]],PROVIDER:[TOKEN:[[id number]],SCHEDULEDAPPT:[05/10/2023],SCHEDULEDAPPTTIME:[03:00 PM]] PROVIDER:[TOKEN:[[id number]:[id number]],SCHEDULEDAPPT:[05/18/2023],SCHEDULEDAPPTTIME:[09:30 AM]],PROVIDER:[TOKEN:[[id number]]],PROVIDER:[TOKEN:[[id number]:[id number]],SCHEDULEDAPPT:[04/26/2023],SCHEDULEDAPPTTIME:[12:00 PM]],PROVIDER:[TOKEN:[[id number]:[id number]]],PROVIDER:[TOKEN:[[id number]:[id number]],SCHEDULEDAPPT:[05/10/2023],SCHEDULEDAPPTTIME:[03:00 PM]],PROVIDER:[TOKEN:[[id number]:[id number]],SCHEDULEDAPPT:[04/24/2023],SCHEDULEDAPPTTIME:[12:00 PM],ESTABLISHEDPATIENT:[T]] PROVIDER:[TOKEN:[[id number]:[id number]],SCHEDULEDAPPT:[05/18/2023],SCHEDULEDAPPTTIME:[09:30 AM]],PROVIDER:[TOKEN:[[id number]:[id number]]],PROVIDER:[TOKEN:[[id number]:[id number]],SCHEDULEDAPPT:[04/26/2023],SCHEDULEDAPPTTIME:[12:00 PM]],PROVIDER:[TOKEN:[[id number]:[id number]]],PROVIDER:[TOKEN:[[id number]:[id number]],SCHEDULEDAPPT:[05/10/2023],SCHEDULEDAPPTTIME:[03:00 PM]],PROVIDER:[TOKEN:[[id number]],SCHEDULEDAPPT:[04/24/2023],SCHEDULEDAPPTTIME:[12:00 PM],ESTABLISHEDPATIENT:[T]],PROVIDER:[TOKEN:[[id number]],SCHEDULEDAPPT:[04/21/2023],SCHEDULEDAPPTTIME:[03:00 PM]]

## 2023-04-17 NOTE — PROGRESS NOTE ADULT - ASSESSMENT
67yo M no known PMH (possible cardiac) here w/ c/o generalized weakness, body pain, and unable to swallow x 6 months with a gradual 50lb weight loss admitted for FTT weakness and dysphagia work up.

## 2023-04-17 NOTE — PROGRESS NOTE ADULT - PROBLEM SELECTOR PLAN 2
Patient with months of dysphagia - started with solid foods now progressively getting worse. Has seen GI who stated he was aspirating but has had no further work up since. Also received laryngoscopy with ENT outpatient in January, which showed green fluid and pt given 14days of augmentin, pt did not complete abx course.  - CT soft tissue showing Layering fluid within the esophagus with patchy consolidations in the included bilateral upper lobes, most likely representing aspiration.  Plan:  - Rheumatology officially consulted today 4/17/23   - Work up done: Jo1 Ab negative, scleroderma Ab negative, Anti RNPs positive, f/u TERE, f/u dsDNA, f/u RF   -s+s eval  [ ] per GI  planned for EGD on 4.17- NPO at [facility] Patient with months of dysphagia - started with solid foods now progressively getting worse. Has seen GI who stated he was aspirating but has had no further work up since. Also received laryngoscopy with ENT outpatient in January, which showed green fluid and pt given 14days of augmentin, pt did not complete abx course.  - CT soft tissue showing Layering fluid within the esophagus with patchy consolidations in the included bilateral upper lobes, most likely representing aspiration.  Plan:  - Rheumatology officially consulted today 4/17/23   - Work up done: Jo1 Ab negative, scleroderma Ab negative, Anti RNPs positive, f/u TERE, f/u dsDNA, f/u RF   - S&S  Moderate oropharyngeal dysphagia primarily marked by reduced bolus manipulation and mastication, severely reduced pharyngeal pressure generation leading to moderate pharyngeal residue which improved to mild-moderate with multiple dry swallows. Laryngeal penetration noted with thin and mildly thick liquids and volitional cough unsuccessful in clearing penetrated material from airway. No aspiration observed across study, however, pt remains at risk for aspiration d/t reduced ability to protect airway. Dysphagia likely chronic/progressive with unclear etiology  - GI planned for EGD today

## 2023-04-17 NOTE — PROGRESS NOTE ADULT - PROBLEM SELECTOR PLAN 5
Patient with transaminitis on admission - no known transaminitis outpatient on his labs. No RUQ tenderness at this time, no liver issues seen on CTAP.  - trend CMP  - f/u RUQ US  - f/u hepatitis panel Patient with transaminitis on admission - no known transaminitis outpatient on his labs. No RUQ tenderness at this time, no liver issues seen on CTAP.  - trend CMP  - f/u RUQ US  - Negative hepatitis panel

## 2023-04-17 NOTE — DISCHARGE NOTE PROVIDER - CARE PROVIDERS DIRECT ADDRESSES
,DirectAddress_Unknown,[email or web address],DirectAddress_Unknown ,[email or web address],[email or web address],DirectAddress_Unknown,DirectAddress_Unknown ,DirectAddress_Unknown,[email or web address],DirectAddress_Unknown,DirectAddress_Unknown,DirectAddress_Unknown ,DirectAddress_Unknown,[email or web address],DirectAddress_Unknown,DirectAddress_Unknown,DirectAddress_Unknown,DirectAddress_Unknown

## 2023-04-17 NOTE — PROGRESS NOTE ADULT - PROBLEM SELECTOR PLAN 7
Patient with testicular mass seen suspicious for malignancy on imaging, had not noticed it himself. Denies urinary sx on admission and at this time.     Discussed case with urology consult service. Primary testicular mass unlikely ; probabilistically , lymphoma more likely in man of his age ; recommended ruling out TB, syphilis Patient with testicular mass seen suspicious for malignancy on imaging, had not noticed it himself. Denies urinary sx on admission and at this time.  Discussed case with urology consult service. Primary testicular mass unlikely ; probabilistically , lymphoma more likely in man of his age ; recommended ruling out TB, syphilis (negative)

## 2023-04-17 NOTE — DISCHARGE NOTE PROVIDER - NSDCFUSCHEDAPPT_GEN_ALL_CORE_FT
[facility]  UROLOGY [address]  Scheduled Appointment: 05/18/2023     Loren Hodges  [facility] Physician [facility]  RHEUM [address]  Scheduled Appointment: 04/26/2023    Lynda Du  [facility]  UROLOGY [address]  Scheduled Appointment: 05/18/2023     Loren Hodges  [facility] Physician [facility]  RHEUM [address]  Scheduled Appointment: 04/26/2023    Kalyn Miles  [facility]  HEMONC [address]  Scheduled Appointment: 05/10/2023    Lynda Du  [facility]  UROLOGY [address]  Scheduled Appointment: 05/18/2023     Loren Hodges  [facility] Physician [facility]  RHEUM [address]  Scheduled Appointment: 04/26/2023    [facility]  UROLOGY [address]  Scheduled Appointment: 05/05/2023    Kalyn Miles  [facility]  HEMONC [address]  Scheduled Appointment: 05/10/2023

## 2023-04-17 NOTE — PROGRESS NOTE ADULT - SUBJECTIVE AND OBJECTIVE BOX
INTERVAL HPI/OVERNIGHT EVENTS: S&S/MBSS weak swallow w/no pharyngeal pressure and residue, continued w/thin liquid diet, Urology - plan for outpatient follow/orchiectomy, Hypothyorid TSH 18, T4 0.7, started 50mcg synthroid per endocrine, tentative plan for EGD w/GI 4/17    SUBJECTIVE: Patient this AM feeling well, tolerating thin liquid diet. Does not note any recent coughing/discomfort with swallowing. No recent N/V, CP, SOB, or abdominal discomfort.       azithromycin   Tablet 500 milliGRAM(s) Oral daily  cefTRIAXone   IVPB 2000 milliGRAM(s) IV Intermittent every 24 hours  enoxaparin Injectable 40 milliGRAM(s) SubCutaneous every 24 hours  nystatin    Suspension 259738 Unit(s) Oral every 24 hours      Vital Signs Last 24 Hrs  T(C): 36.3 (17 Apr 2023 05:50), Max: 36.4 (16 Apr 2023 13:12)  T(F): 97.4 (17 Apr 2023 05:50), Max: 97.6 (16 Apr 2023 21:28)  HR: 85 (17 Apr 2023 05:50) (85 - 105)  BP: 118/71 (17 Apr 2023 05:50) (118/71 - 122/82)  BP(mean): --  RR: 17 (17 Apr 2023 05:50) (17 - 18)  SpO2: 95% (17 Apr 2023 05:50) (94% - 95%)    Parameters below as of 17 Apr 2023 05:50  Patient On (Oxygen Delivery Method): room air      I&O's Detail      General: NAD, resting comfortably in bed  C/V: NSR  Pulm: Nonlabored breathing, no respiratory distress  Abd: soft, NT/ND.  Extrem: WWP, no edema,    LABS:                        9.8    10.47 )-----------( 455      ( 16 Apr 2023 06:18 )             29.2     04-16    134<L>  |  100  |  21  ----------------------------<  78  4.1   |  24  |  1.37<H>    Ca    8.6      16 Apr 2023 06:18  Phos  3.7     04-16  Mg     1.9     04-16            RADIOLOGY & ADDITIONAL STUDIES:

## 2023-04-18 LAB
ALBUMIN SERPL ELPH-MCNC: 2.2 G/DL — LOW (ref 3.3–5)
ALP SERPL-CCNC: 47 U/L — SIGNIFICANT CHANGE UP (ref 40–120)
ALT FLD-CCNC: 88 U/L — HIGH (ref 10–45)
ANION GAP SERPL CALC-SCNC: 10 MMOL/L — SIGNIFICANT CHANGE UP (ref 5–17)
AST SERPL-CCNC: 174 U/L — HIGH (ref 10–40)
BILIRUB SERPL-MCNC: 0.3 MG/DL — SIGNIFICANT CHANGE UP (ref 0.2–1.2)
BUN SERPL-MCNC: 15 MG/DL — SIGNIFICANT CHANGE UP (ref 7–23)
C DIFF GDH STL QL: NEGATIVE — SIGNIFICANT CHANGE UP
C DIFF GDH STL QL: SIGNIFICANT CHANGE UP
CALCIUM SERPL-MCNC: 8.7 MG/DL — SIGNIFICANT CHANGE UP (ref 8.4–10.5)
CHLORIDE SERPL-SCNC: 100 MMOL/L — SIGNIFICANT CHANGE UP (ref 96–108)
CO2 SERPL-SCNC: 25 MMOL/L — SIGNIFICANT CHANGE UP (ref 22–31)
CREAT SERPL-MCNC: 1.5 MG/DL — HIGH (ref 0.5–1.3)
DSDNA AB SER-ACNC: <12 IU/ML — SIGNIFICANT CHANGE UP
EGFR: 51 ML/MIN/1.73M2 — LOW
GAMMA INTERFERON BACKGROUND BLD IA-ACNC: 0.02 IU/ML — SIGNIFICANT CHANGE UP
GI PCR PANEL: SIGNIFICANT CHANGE UP
GLUCOSE BLDC GLUCOMTR-MCNC: 130 MG/DL — HIGH (ref 70–99)
GLUCOSE BLDC GLUCOMTR-MCNC: 80 MG/DL — SIGNIFICANT CHANGE UP (ref 70–99)
GLUCOSE BLDC GLUCOMTR-MCNC: 82 MG/DL — SIGNIFICANT CHANGE UP (ref 70–99)
GLUCOSE BLDC GLUCOMTR-MCNC: 94 MG/DL — SIGNIFICANT CHANGE UP (ref 70–99)
GLUCOSE SERPL-MCNC: 81 MG/DL — SIGNIFICANT CHANGE UP (ref 70–99)
HCT VFR BLD CALC: 29.8 % — LOW (ref 39–50)
HGB BLD-MCNC: 10.2 G/DL — LOW (ref 13–17)
HIV-1 VIRAL LOAD RESULT: SIGNIFICANT CHANGE UP
HIV1 RNA # SERPL NAA+PROBE: SIGNIFICANT CHANGE UP COPIES/ML
HIV1 RNA SER-IMP: SIGNIFICANT CHANGE UP
HIV1 RNA SERPL NAA+PROBE-ACNC: SIGNIFICANT CHANGE UP
HIV1 RNA SERPL NAA+PROBE-LOG#: SIGNIFICANT CHANGE UP LG COP/ML
IGE SERPL-ACNC: 662 KU/L — HIGH
M TB IFN-G BLD-IMP: ABNORMAL
M TB IFN-G CD4+ BCKGRND COR BLD-ACNC: 0 IU/ML — SIGNIFICANT CHANGE UP
M TB IFN-G CD4+CD8+ BCKGRND COR BLD-ACNC: 0 IU/ML — SIGNIFICANT CHANGE UP
MAGNESIUM SERPL-MCNC: 2 MG/DL — SIGNIFICANT CHANGE UP (ref 1.6–2.6)
MCHC RBC-ENTMCNC: 23.8 PG — LOW (ref 27–34)
MCHC RBC-ENTMCNC: 34.2 GM/DL — SIGNIFICANT CHANGE UP (ref 32–36)
MCV RBC AUTO: 69.6 FL — LOW (ref 80–100)
NRBC # BLD: 0 /100 WBCS — SIGNIFICANT CHANGE UP (ref 0–0)
PHOSPHATE SERPL-MCNC: 3.4 MG/DL — SIGNIFICANT CHANGE UP (ref 2.5–4.5)
PLATELET # BLD AUTO: 406 K/UL — HIGH (ref 150–400)
POTASSIUM SERPL-MCNC: 3.6 MMOL/L — SIGNIFICANT CHANGE UP (ref 3.5–5.3)
POTASSIUM SERPL-SCNC: 3.6 MMOL/L — SIGNIFICANT CHANGE UP (ref 3.5–5.3)
PROT SERPL-MCNC: 7.3 G/DL — SIGNIFICANT CHANGE UP (ref 6–8.3)
QUANT TB PLUS MITOGEN MINUS NIL: 0.1 IU/ML — SIGNIFICANT CHANGE UP
RBC # BLD: 4.28 M/UL — SIGNIFICANT CHANGE UP (ref 4.2–5.8)
RBC # FLD: 20 % — HIGH (ref 10.3–14.5)
SODIUM SERPL-SCNC: 135 MMOL/L — SIGNIFICANT CHANGE UP (ref 135–145)
SURGICAL PATHOLOGY STUDY: SIGNIFICANT CHANGE UP
WBC # BLD: 7.75 K/UL — SIGNIFICANT CHANGE UP (ref 3.8–10.5)
WBC # FLD AUTO: 7.75 K/UL — SIGNIFICANT CHANGE UP (ref 3.8–10.5)

## 2023-04-18 PROCEDURE — 99232 SBSQ HOSP IP/OBS MODERATE 35: CPT

## 2023-04-18 PROCEDURE — 76705 ECHO EXAM OF ABDOMEN: CPT | Mod: 26

## 2023-04-18 RX ORDER — NYSTATIN 500MM UNIT
500000 POWDER (EA) MISCELLANEOUS EVERY 6 HOURS
Refills: 0 | Status: DISCONTINUED | OUTPATIENT
Start: 2023-04-18 | End: 2023-04-19

## 2023-04-18 RX ORDER — POTASSIUM CHLORIDE 20 MEQ
40 PACKET (EA) ORAL ONCE
Refills: 0 | Status: COMPLETED | OUTPATIENT
Start: 2023-04-18 | End: 2023-04-18

## 2023-04-18 RX ADMIN — PANTOPRAZOLE SODIUM 40 MILLIGRAM(S): 20 TABLET, DELAYED RELEASE ORAL at 17:48

## 2023-04-18 RX ADMIN — Medication 1 TABLET(S): at 17:11

## 2023-04-18 RX ADMIN — CEFTRIAXONE 100 MILLIGRAM(S): 500 INJECTION, POWDER, FOR SOLUTION INTRAMUSCULAR; INTRAVENOUS at 04:00

## 2023-04-18 RX ADMIN — PANTOPRAZOLE SODIUM 40 MILLIGRAM(S): 20 TABLET, DELAYED RELEASE ORAL at 06:15

## 2023-04-18 RX ADMIN — Medication 105 MILLIGRAM(S): at 22:17

## 2023-04-18 RX ADMIN — Medication 500000 UNIT(S): at 17:48

## 2023-04-18 RX ADMIN — ENOXAPARIN SODIUM 40 MILLIGRAM(S): 100 INJECTION SUBCUTANEOUS at 19:33

## 2023-04-18 RX ADMIN — Medication 105 MILLIGRAM(S): at 17:09

## 2023-04-18 RX ADMIN — Medication 40 MILLIEQUIVALENT(S): at 17:10

## 2023-04-18 NOTE — PROGRESS NOTE ADULT - ASSESSMENT
67yo M no known PMH (possible cardiac) here w/ c/o generalized weakness, body pain, and unable to swallow x 6 months with a gradual 50lb weight loss admitted for FTT weakness and dysphagia work up. He had US scrotal obtained on 4/14/23 that showed a 1.7 cm hypervascular solid lesion in the right testicle suspicious for neoplasm given lack of symptoms. CT s/f 1.1cm rim enhancing hyopdense lesion in posterior L parietal lobe, possible metastasis.     Recs:  - Appreciate excellent care per primary team  -Please send serum tumor markers AFP, LDH, bHCG  -testicular mass in 68M is more likely to be lymphoma if all else is negative  - discussed with attending

## 2023-04-18 NOTE — PROGRESS NOTE ADULT - ASSESSMENT
67yo male with no known PMhx admitted with progressive weight loss, and oropharyngeal dysphagia with imaging thus far noting L parietal lobe lesion, mildly dilated prox/mid esophagus with ongoing multi-speciality oncologic eval    GI consulted for dysphagia    Esophagram noting prominent CP bar which can explain oropharyngeal dysphagia, subsequently dilated during EGD    EGD 4/17/23  - Esophagus  A small size hiatal hernia was seen. Retroflexion view in the stomach confirmed the size and morphology of the hernia.  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 The Z-line was at 40 cm from the incisors and the upper end of the gastric folds started at 42 cm from the incisors. Additional findings include tongue(s) of salmon epithelium that extended upward from above the top of the gastric folds. Cold forceps targeted only biopsies were performed for histology in the lower third of the esophagus.  A small narrowing was noted just distal to UES, easily passed with gastroscope. A 12 mm wire guided polyvinyl dilator was introduced for dilation and the diameter was progressively increased to 14 mm successfully.    Stomach  Abnormal mucosa was noted in the pre-pyloric region. Multiple cold forceps biopsies were performed for histology in the pre-pyloric region.    Duodenum  Normal mucosa was noted in the whole examined duodenum. Random mucosal biopsies were obtained to evaluate for histologic features of celiac disease. Multiple cold forceps biopsies were performed for histology in the second part of the duodenum    Recommendations:  Full Liquid Diet  Await pathology results.  PPI bid    Thank you for allowing us to participate in the care of this patient. GI will sign off the case.  Please call us if you have any further questions or concerns    Bora Cam M.D.  Gastroenterology Fellow  Weekday Pager: [phone]  Weeknights/Weekend Coverage: Please Call the  for contact info         67yo male with no known PMhx admitted with progressive weight loss, and oropharyngeal dysphagia with imaging thus far noting L parietal lobe lesion, mildly dilated prox/mid esophagus with ongoing multi-speciality oncologic eval    GI consulted for dysphagia    Esophagram noting prominent CP bar which can explain oropharyngeal dysphagia, subsequently dilated during EGD    EGD 4/17/23  - Esophagus  A small size hiatal hernia was seen. Retroflexion view in the stomach confirmed the size and morphology of the hernia.  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 The Z-line was at 40 cm from the incisors and the upper end of the gastric folds started at 42 cm from the incisors. Additional findings include tongue(s) of salmon epithelium that extended upward from above the top of the gastric folds. Cold forceps targeted only biopsies were performed for histology in the lower third of the esophagus.  A small narrowing was noted just distal to UES, easily passed with gastroscope. A 12 mm wire guided polyvinyl dilator was introduced for dilation and the diameter was progressively increased to 14 mm successfully.    Stomach  Abnormal mucosa was noted in the pre-pyloric region. Multiple cold forceps biopsies were performed for histology in the pre-pyloric region.    Duodenum  Normal mucosa was noted in the whole examined duodenum. Random mucosal biopsies were obtained to evaluate for histologic features of celiac disease. Multiple cold forceps biopsies were performed for histology in the second part of the duodenum    Recommendations:  Full Liquid Diet  Await pathology results.  PPI bid  when close to discharge, please call GI service to discuss role of repeat endoscopic dilation    Thank you for allowing us to participate in the care of this patient..  Please call us if you have any further questions or concerns    Bora Cam M.D.  Gastroenterology Fellow  Weekday Pager: [phone]  Weeknights/Weekend Coverage: Please Call the  for contact info

## 2023-04-18 NOTE — DIETITIAN INITIAL EVALUATION ADULT - PERTINENT LABORATORY DATA
04-18    135  |  100  |  15  ----------------------------<  81  3.6   |  25  |  1.50<H>    Ca    8.7      18 Apr 2023 05:30  Phos  3.4     04-18  Mg     2.0     04-18    TPro  7.3  /  Alb  2.2<L>  /  TBili  0.3  /  DBili  x   /  AST  174<H>  /  ALT  88<H>  /  AlkPhos  47  04-18  POCT Blood Glucose.: 82 mg/dL (04-18-23 @ 06:35)

## 2023-04-18 NOTE — PROGRESS NOTE ADULT - PROBLEM SELECTOR PLAN 7
Patient with testicular mass seen suspicious for malignancy on imaging, had not noticed it himself. Denies urinary sx on admission and at this time. Discussed case with urology consult service. Primary testicular mass unlikely; probably lymphoma more likely in man of his age; recommended ruling out TB, syphilis (negative).   - Will need outpatient orchiectomy with Urology  - Continue malignancy workup

## 2023-04-18 NOTE — PROGRESS NOTE ADULT - PROBLEM SELECTOR PLAN 10
Pt with hx of progressive edema in b/l upper lower extremities and facial area. Was on lasix which helped but stopped seeing PCP after COVID. Has started following with cardiologist, Dr. Nolen, who he also treats as his PCP. Has had an echo in the past which he believes was normal.  on admission. Home medications: HCTZ 12.5, Metoprolol Tartrate 25mg BID  - no significant LE edema on exam, BL upper extremity edema  - TTE showing: PASP 34 mmHg, moderate pericardial effusion without tamponade physiology. Right pleural effusion.  - LE dopplers without evidence of DVT   - collateral from cardiologist/PCP Dr. Ruperto Meyer [phone]

## 2023-04-18 NOTE — DIETITIAN INITIAL EVALUATION ADULT - OTHER INFO
69yo M here w/ c/o generalized weakness, body pain, and unable to swallow x 6 months with a gradual 50lb weight loss admitted for FTT and dysphagia work up. 4/14: Pt seen by Neurosurgery - concern for neoplasm. Per S/S, modified barium swallow was done -- weak swallow with no pharyngeal pressure and residue, easiest w/ thin liquids -- continued on full thin liquid diet at this time. 4/17: K repleted. Patient undergoing EGD around 4pm with GI.    Visited pt at bedside this AM on 7UR, awake and alert. Pt reports decreased appetite/intake 2/2 decreased swallowing ability x 5 months. Pt was mainly consuming liquids, such as soups and some pureed foods. Reports unintentional wt loss of 20lbs x 2-3 months (however per MD notes ? pt lost ~60lbs). According to pt report, pt experienced 11.2% wt change x 2-3 months which is clinically significant. No overt fat/muscle wasting noted however given wt loss and poor PO intake, pt meets criteria for severe malnutrition at this time. Pt reports improved appetite in-house given improved swallowing ability. Pt is currently on a full-liquid diet. Discussed trailing ONS regimen to better meet estimated nutrition needs; pt agrees (asks for vanilla flavor). View recs below. denies nvdc, last BM yesterday 4/17. Nutrition related labs are unremarkable at this time. pain scale 0. NKFA. View full recs below    Clinical nutrition services will continue to follow up pt per organizational policy. Please place new consult for any acute nutrition-related issues that may arise prior to follow up

## 2023-04-18 NOTE — PROGRESS NOTE ADULT - PROBLEM SELECTOR PLAN 4
Patient with CT showing 1.1 cm rim-enhancing hypodense lesion in the posterior left parietal lobe seen only on axial images on CT soft tissue of neck prelim read, but not on CTH prelim read. Ddx possible metastasis from malignancy, less likely infectious. MRI brain: No intracranial mass, abnormal enhancement, nor other focal pathology. MRI C Spine: No cervical spinal cord compression. No mass.  - NSG signed off. No interventions planned

## 2023-04-18 NOTE — PROGRESS NOTE ADULT - PROBLEM SELECTOR PLAN 5
Patient with transaminitis on admission - no known transaminitis outpatient on his labs. No RUQ tenderness at this time, no liver issues seen on CTAP. Abdominal US showed gallbladder sludge. No acute cholecystitis. Normal sonographic appearance of the liver. Negative hepatitis panel.   - Trend CMP

## 2023-04-18 NOTE — DIETITIAN INITIAL EVALUATION ADULT - PROBLEM SELECTOR PLAN 5
patient with transaminitis on admission - no known transaminitis outpatient on his labs  no RUQ tenderness at this time, no liver issues seen on CTAP   -f/u CMP in AM  -f/u RUQ u/s   -f/u hepatits panel

## 2023-04-18 NOTE — PROGRESS NOTE ADULT - ATTENDING COMMENTS
Patient s/p EGD, reports some throat discomfort tolerating full liquid diet. Seen by rheumatology. No other complaints or events reported.  Labs reviewed    Patient with dysphagia tolerating full liquid diet, appreciate Nutrition, Patient meets severe malnutrition. S/P EGD, follow-up pathology results.  Neurology consulted per Rheumatology, patient to follow-up with rheumatology as outpatient  Urology as outpatient for orchiectomy  Patient to follow-up with Oncology as outpatient Patient s/p EGD, reports some throat discomfort tolerating full liquid diet. Seen by rheumatology. No other complaints or events reported.  Labs reviewed    Patient with dysphagia tolerating full liquid diet, appreciate Nutrition, Patient meets severe malnutrition. S/P EGD, follow-up pathology results.  Neurology consulted per Rheumatology, patient to follow-up with rheumatology as outpatient. Mixed connective tissue panel sent  Urology as outpatient for orchiectomy, suspected lymphoma   Patient to follow-up with Oncology as outpatient

## 2023-04-18 NOTE — DIETITIAN INITIAL EVALUATION ADULT - PROBLEM SELECTOR PLAN 4
patient with CT showing 1.1 cm rim-enhancing hypodense lesion in the posterior left parietal lobe seen   only on axial images but not on cth   nsgy consulted  -f/u nsgy recs  -differential includes malignancy/metastasis --> infx not likely in patients progressive presentation & appearance  -consider heme on consult in AM   -consult neuro in AM

## 2023-04-18 NOTE — DIETITIAN INITIAL EVALUATION ADULT - PROBLEM SELECTOR PLAN 8
patient with b/l GGO seen on ctscan  consistent with likely aspiration pna  s/p abx outpatient  at this time will keep pt npo  -S+S eval  -ctm oxygen requirements  -c/w ctx 2g

## 2023-04-18 NOTE — DIETITIAN INITIAL EVALUATION ADULT - PROBLEM SELECTOR PLAN 9
pt with hx of progressive edema in b/l upper lower extremities and facial area  was on hctz which he said helped but he stopped seeing pcp  does not remember what tte shows but states its normal  -f/u tte  -f/u b/l le dopplers  -f/u BNP  - consider albumin

## 2023-04-18 NOTE — DIETITIAN INITIAL EVALUATION ADULT - SIGNS/SYMPTOMS
AEB wt hx, poor PO intake pta  AEB decreased swallowing ability x 6 months, able to tolerate fluids/pureed foods only

## 2023-04-18 NOTE — DIETITIAN INITIAL EVALUATION ADULT - PROBLEM SELECTOR PLAN 2
patient with months of dysphagia - started with solid foods now progressively getting worse  has seen GI who stated he was aspirating but has had no further work up since  CT soft tissue showing Layering fluid within the esophagus with patchy consolidations in the included bilateral upper lobes, most likely representing aspiration.  has thickening of skin & difficulty raising arms over shoulders as well as worsening of sx with cold foods and environment, possibly rheum etiology   -consult gi in am  -scleroderma rheum labs  -s+s eval  -npo for now  -c/w ctx 2g

## 2023-04-18 NOTE — PROGRESS NOTE ADULT - PROBLEM SELECTOR PLAN 2
Patient with months of dysphagia - started with solid foods now progressively getting worse. Has seen GI who stated he was aspirating but has had no further work up since. Also received laryngoscopy with ENT outpatient in January, which showed green fluid and pt given 14days of Augmentin, pt did not complete abx course.  - CT soft tissue showing layering fluid within the esophagus with patchy consolidations in the included bilateral upper lobes, most likely representing aspiration.  - S&S evaluation: Moderate oropharyngeal dysphagia primarily marked by reduced bolus manipulation and mastication, severely reduced pharyngeal pressure generation leading to moderate pharyngeal residue which improved to mild-moderate with multiple dry swallows. Laryngeal penetration noted with thin and mildly thick liquids and volitional cough unsuccessful in clearing penetrated material from airway. No aspiration observed across study, however, pt remains at risk for aspiration d/t reduced ability to protect airway. Dysphagia likely chronic/progressive with unclear etiology.  - Anti RNP positive. Negative so far: Jo1 Ab, scleroderma Ab  - F/u TERE, dsDNA, RF  - F/u EGD biopsies   - Rheumatology consulted for evaluation of possible mixed connective tissue (limited, ?CREST)  - Follows Dr. Miles outpatient for hematology/oncology

## 2023-04-18 NOTE — DIETITIAN INITIAL EVALUATION ADULT - PROBLEM SELECTOR PLAN 7
patient with testicular mass seen on imaging  had not noticed it himself  no urinary sx and this time  -consult uro in am  -testicular ultrasound  -consult heme onc

## 2023-04-18 NOTE — PROGRESS NOTE ADULT - SUBJECTIVE AND OBJECTIVE BOX
Physical Medicine and Rehabilitation Progress Note :       Patient is a 68y old  Male who presents with a chief complaint of ftt (18 Apr 2023 09:58)      HPI:  67yo M here w/ c/o generalized weakness, body pain, and unable to swallow x 6 months with a gradual 50lb weight loss. All of his issues started 1 year ago per pt he started to have lower extremity swelling, was started on lasix and subsequently lost to f/u so stoppe dhis lasix and then started to have facial swelling 4 months ago. He said he had a sonogram of his heart and was told something was abnormal but does not remember what.  After that he had difficulty swallowing solid foods which has progressed to sometimes thin foods as well. He saw his gastroenterologist Dr Meier who told him he was having aspiration but he never had imaging or work up. He took antibiotics for 'aspiration' but said that they did not help because he never had much of a cough or fever.  He was [facility] to [facility]. He has been having falls about 3 for the past few months. States had spine imaging that he did not bring of his thoracic spine that was totally normal, weakness attributed to dehydation/weight loss not spinal. Pt denies any fever, chills, n/v, diarrhea/ constipation.      In the ED:  Vitals 98.6  /77 RA 95%   Labs: WBC 11.99 Hg 10.4 Plt 453 PT 13.5 INR 1.13 Na 133 K 4.4 Cl 97 Bicarb 26 AG 22 Cr 1.36   TSH 12.64   Imaging: CTAP  1.  Mild distention of the esophagus with layering fluid, with multifocal   patchy consolidative and groundglass opacities bilaterally, most likely   representing aspiration pneumonia.  2.  Partially visualized indeterminate 1.7 cm hyperdense lesion in the   right hemiscrotum, suspicious for malignancy. Recommend further   evaluation with scrotal ultrasound.  3.  Circumferential wall thickening of the urinary bladder, possibly   reflecting under distention, although correlation with urinalysis is   recommended to exclude underlying acute infectious/inflammatory cystitis.  4.  Diffuse subcutaneous edema.  Ct soft tissue   1.  Limited intracranial images demonstrate questionable 1.1 cm   rim-enhancing hypodense lesion in the posterior left parietal lobe seen   only on axial images. Differential includes malignancy/metastasis versus   less likely infection in this assumed immunocompetent patient. Recommend   further evaluation with contrast-enhanced MRI of the brain.  2.  Layering fluid within the esophagus with patchy consolidations in the   included bilateral upper lobes, most likely representing aspiration.   (14 Apr 2023 02:32)                            10.2   7.75  )-----------( 406      ( 18 Apr 2023 05:30 )             29.8       04-18    135  |  100  |  15  ----------------------------<  81  3.6   |  25  |  1.50<H>    Ca    8.7      18 Apr 2023 05:30  Phos  3.4     04-18  Mg     2.0     04-18    TPro  7.3  /  Alb  2.2<L>  /  TBili  0.3  /  DBili  x   /  AST  174<H>  /  ALT  88<H>  /  AlkPhos  47  04-18    Vital Signs Last 24 Hrs  T(C): 36.4 (18 Apr 2023 06:00), Max: 36.4 (17 Apr 2023 15:00)  T(F): 97.6 (18 Apr 2023 06:00), Max: 97.6 (17 Apr 2023 15:00)  HR: 89 (18 Apr 2023 06:00) (85 - 89)  BP: 120/68 (18 Apr 2023 06:00) (110/74 - 129/77)  BP(mean): 92 (17 Apr 2023 16:15) (92 - 92)  RR: 18 (18 Apr 2023 06:00) (17 - 18)  SpO2: 94% (18 Apr 2023 06:00) (94% - 99%)    Parameters below as of 18 Apr 2023 06:00  Patient On (Oxygen Delivery Method): room air        MEDICATIONS  (STANDING):  cefTRIAXone   IVPB 2000 milliGRAM(s) IV Intermittent every 24 hours  enoxaparin Injectable 40 milliGRAM(s) SubCutaneous every 24 hours  levothyroxine 50 MICROGram(s) Oral daily  Nephro-james 1 Tablet(s) Oral daily  nystatin    Suspension 057650 Unit(s) Oral every 24 hours  pantoprazole    Tablet 40 milliGRAM(s) Oral two times a day  thiamine IVPB 500 milliGRAM(s) IV Intermittent every 8 hours    MEDICATIONS  (PRN):         Functional Status Assessment :   4/17/2023      Pain Assessment/Number Scale (0-10) Adult  Presence of Pain: denies pain/discomfort (Rating = 0)    Therapeutic Interventions      Bed Mobility  Bed Mobility Training Rolling/Turning: minimum assist (75% patient effort);  1 person assist;  bed rails  Bed Mobility Training Sit-to-Supine: minimum assist (75% patient effort);  1 person assist;  bed rails  Bed Mobility Training Supine-to-Sit: minimum assist (75% patient effort);  1 person assist;  bed rails  Bed Mobility Training Limitations: decreased strength    Sit-Stand Transfer Training  Transfer Training Sit-to-Stand Transfer: stand-by assist;  contact guard;  Verbal cues for safe hand placement and sequencing. ;  rolling walker  Transfer Training Stand-to-Sit Transfer: stand-by assist;  contact guard;  Verbal cues for safe hand placement and sequencing. ;  rolling walker  Sit-to-Stand Transfer Training Transfer Safety Analysis: decreased weight-shifting ability;  decreased sequencing ability;  decreased strength    Gait Training  Gait Training: contact guard;  stand-by assist;  rolling walker;  50 feet  Gait Analysis: swing-through gait   decreased alonzo;  increased time in double stance;  decreased hip/knee flexion;  decreased step length;  decreased stride length;  increased stride width;  decreased weight-shifting ability;  decreased strength;  impaired balance    Therapeutic Exercise  Therapeutic Exercise Detail: Ankle pumps, 2x10 each LE, to improve ankle DF ROM for midstance phase of gait and help prevent DVTs;Chin tucks in supine, 5sec holds x15 reps, to improve postural control;Supine bridges, 1x5, to improve glute strength for transfers and stance phase of gait; Straight leg raises, 1x10 each LE, to improve knee ext strength; Sitting scapular retractions with tactile cues for proper technique, 1x15 reps, to improve postural control and lung ventilation;           PM&R Impression : as above    Current Disposition Plan Recommendations :    d/c home, home physical therapy , family assist

## 2023-04-18 NOTE — PROGRESS NOTE ADULT - SUBJECTIVE AND OBJECTIVE BOX
GASTROENTEROLOGY PROGRESS NOTE  Patient seen and examined at bedside. mild sore throat post procedure    PERTINENT REVIEW OF SYSTEMS:  CONSTITUTIONAL: No weakness, fevers or chills  HEENT: No visual changes; No vertigo or throat pain   GASTROINTESTINAL: As above.  NEUROLOGICAL: No numbness or weakness  SKIN: No itching, burning, rashes, or lesions     Allergies    No Known Allergies    Intolerances      MEDICATIONS:  MEDICATIONS  (STANDING):  cefTRIAXone   IVPB 2000 milliGRAM(s) IV Intermittent every 24 hours  enoxaparin Injectable 40 milliGRAM(s) SubCutaneous every 24 hours  levothyroxine 50 MICROGram(s) Oral daily  Nephro-james 1 Tablet(s) Oral daily  nystatin    Suspension 859643 Unit(s) Oral every 24 hours  pantoprazole    Tablet 40 milliGRAM(s) Oral two times a day  thiamine IVPB 500 milliGRAM(s) IV Intermittent every 8 hours    MEDICATIONS  (PRN):    Vital Signs Last 24 Hrs  T(C): 36.4 (18 Apr 2023 06:00), Max: 36.4 (17 Apr 2023 15:00)  T(F): 97.6 (18 Apr 2023 06:00), Max: 97.6 (17 Apr 2023 15:00)  HR: 89 (18 Apr 2023 06:00) (85 - 89)  BP: 120/68 (18 Apr 2023 06:00) (110/74 - 129/77)  BP(mean): 92 (17 Apr 2023 16:15) (92 - 92)  RR: 18 (18 Apr 2023 06:00) (17 - 18)  SpO2: 94% (18 Apr 2023 06:00) (94% - 99%)    Parameters below as of 18 Apr 2023 06:00  Patient On (Oxygen Delivery Method): room air        PHYSICAL EXAM:    General: lying in bed, in no acute distress  HEENT: MMM, conjunctiva and sclera clear  Gastrointestinal: Soft non-tender non-distended; No rebound or guarding  Skin: Warm and dry. No obvious rash    LABS:                        10.2   7.75  )-----------( 406      ( 18 Apr 2023 05:30 )             29.8     04-18    135  |  100  |  15  ----------------------------<  81  3.6   |  25  |  1.50<H>    Ca    8.7      18 Apr 2023 05:30  Phos  3.4     04-18  Mg     2.0     04-18    TPro  7.3  /  Alb  2.2<L>  /  TBili  0.3  /  DBili  x   /  AST  174<H>  /  ALT  88<H>  /  AlkPhos  47  04-18    PT/INR - ( 17 Apr 2023 07:39 )   PT: 13.3 sec;   INR: 1.12          PTT - ( 17 Apr 2023 07:39 )  PTT:31.4 sec                  RADIOLOGY & ADDITIONAL STUDIES:  Reviewed

## 2023-04-18 NOTE — ANESTHESIA FOLLOW-UP NOTE - NSRECOMMENDFT_GEN_ALL_CORE
Patient with limited mouth opening which may make him a difficult airway.  Pt. was intubated with glidescope.

## 2023-04-18 NOTE — PROGRESS NOTE ADULT - ASSESSMENT
IM    68 y o M no known PMH (possible cardiac) here w/ c/o generalized weakness, body pain, and unable to swallow x 6 months with a gradual 50lb weight loss admitted for FTT weakness and dysphagia work up.       Problem/Plan - 1:  ·  Problem: Weakness.   ·  Plan: Patient presents with ongoing weakness for many months. No precipitating factor. Had 3 falls over the course of a few months, one mechanical in nature when walking up stairs, 2 while in bathroom. Does not use any devices at home to ambulate, wife assists with walking. Pt has neck weakness, has been using c collar at home to help support his head. Ddx broad at this point, suspicion for malignancy given scrotal mass seen on imaging, less likely infectious. Has thickening of skin & difficulty raising arms over shoulders as well as worsening of sx with cold foods and environment, possibly rheum etiology though less likely.  -B12 wnl, folate wnl  -HIV negative   - MRI C spine:  No cervical spinal cord compression. No mass.  Multilevel spondylosis with left asymmetric neural foraminal narrowings. No significant spinal stenosis.  - MRI brain:   No intracranial mass, abnormal enhancement, nor other focal pathology.  - TSH 12.64 elevated. Endocrine consulted, recs appreciated. Started levothyroxine 50mcg   - Nsgy recs. No intervention. NSGY signed off   - Gen surg following   - PT recs: Home PT.    Problem/Plan - 2:  ·  Problem: Dysphagia.   ·  Plan: Patient with months of dysphagia - started with solid foods now progressively getting worse. Has seen GI who stated he was aspirating but has had no further work up since. Also received laryngoscopy with ENT outpatient in January, which showed green fluid and pt given 14days of augmentin, pt did not complete abx course.  - CT soft tissue showing Layering fluid within the esophagus with patchy consolidations in the included bilateral upper lobes, most likely representing aspiration.  Plan:  - Rheumatology officially consulted today 4/17/23   - Work up done: Jo1 Ab negative, scleroderma Ab negative, Anti RNPs positive, f/u TERE, f/u dsDNA, f/u RF   - S&S  Moderate oropharyngeal dysphagia primarily marked by reduced bolus manipulation and mastication, severely reduced pharyngeal pressure generation leading to moderate pharyngeal residue which improved to mild-moderate with multiple dry swallows. Laryngeal penetration noted with thin and mildly thick liquids and volitional cough unsuccessful in clearing penetrated material from airway. No aspiration observed across study, however, pt remains at risk for aspiration d/t reduced ability to protect airway. Dysphagia likely chronic/progressive with unclear etiology  - GI planned for EGD today.    Problem/Plan - 3:  ·  Problem: Leukocytosis.   ·  Plan: Patient with mild leukocytosis. Possible etiology aspiration pna seen on imaging. Pt on room air - no cough at this time. Pt w/o fevers or other infectious sxs. UA neg. COVID neg.   -c/w tx for possible aspiration PNA as below.    Problem/Plan - 4:  ·  Problem: Brain lesion.   ·  Plan: Patient with CT showing 1.1 cm rim-enhancing hypodense lesion in the posterior left parietal lobe seen only on axial images on CT soft tissue of neck prelim read, but not on CTH prelim read. Ddx possible metastasis from malignancy, less likely infectious.  MRI brain:   No intracranial mass, abnormal enhancement, nor other focal pathology.  MRI C Spine:   No cervical spinal cord compression. No mass.  - NSG signed off. No interventions planned.    Problem/Plan - 5:  ·  Problem: Transaminitis.   ·  Plan: Patient with transaminitis on admission - no known transaminitis outpatient on his labs. No RUQ tenderness at this time, no liver issues seen on CTAP.  - trend CMP  - f/u RUQ US  - Negative hepatitis panel.    Problem/Plan - 6:  ·  Problem: OLGA (acute kidney injury).   ·  Plan: Patient presents with an olga considering no hx of ckd. Likely hypovolemic as pt with poor PO intake.  - c/w 500cc maintenance fluids  - f/u urine lytes  - trend Cr  - avoid nephrotoxic medications.    Problem/Plan - 7:  ·  Problem: Testicular mass.   ·  Plan: Patient with testicular mass seen suspicious for malignancy on imaging, had not noticed it himself. Denies urinary sx on admission and at this time.  Discussed case with urology consult service. Primary testicular mass unlikely ; probabilistically , lymphoma more likely in man of his age ; recommended ruling out TB, syphilis (negative).    Problem/Plan - 8:  ·  Problem: Pneumonia, aspiration.   ·  Plan: Patient with b/l GGO seen on ctscan, consistent with likely aspiration pna. Patient also with dysphagia. Received augmentin 14d in Jan but did not complete course. NPO    # likely gram negative pneumonia   continue treating gram negatives with ceftriaxone  complete 3 days of azithromycin  - continue dysphagia workup   - c/w CTX 2g IV qD (4/14- )  - ctm oxygen requirements.    Problem/Plan - 9:  ·  Problem: Lower extremity edema.   ·  Plan: Pt with hx of progressive edema in b/l upper lower extremities and facial area. Was on lasix which helped but stopped seeing PCP after COVID. Has started following with cardiologist, Dr. Nolen, who he also treats as his PCP. Has had an echo in the past which he believes was normal.  on admission. Home medications: HCTZ 12.5, Metoprolol Tartrate 25mg BID  - no significant LE edema on exam, BL upper extremity edema  - f/u tte  - LE dopplers without evidence of DVT   - collateral from cardiologist/PCP Dr. Rueprto Meyer [phone].    Problem/Plan - 10:  ·  Problem: Prophylactic measure.   ·  Plan; F: none  E: replete as needed  N: npo  DVT ppx: lovenox.    Problem/Plan - 11:  ·  Problem: Hypothyroid.   ·  Plan: Patient p/w weakness; TSH 18.86, free thyroxine 0.712  - start levothyroxine 50 mcg  - endo consulted.

## 2023-04-18 NOTE — DIETITIAN INITIAL EVALUATION ADULT - OTHER CALCULATIONS
Actual body weight used to calculate energy needs due to pt's current body weight within % ideal body weight. Adjust for malnutrition, age, hospital course

## 2023-04-18 NOTE — PROGRESS NOTE ADULT - SUBJECTIVE AND OBJECTIVE BOX
CC: Patient is a 68y old  Male who presents with a chief complaint of ftt (18 Apr 2023 12:48)      INTERVAL EVENTS: DAHIANA    SUBJECTIVE / INTERVAL HPI: Patient seen and examined at bedside.     ROS: negative unless otherwise stated above.    VITAL SIGNS:  Vital Signs Last 24 Hrs  T(C): 36.6 (18 Apr 2023 12:00), Max: 36.6 (18 Apr 2023 12:00)  T(F): 97.8 (18 Apr 2023 12:00), Max: 97.8 (18 Apr 2023 12:00)  HR: 87 (18 Apr 2023 12:00) (85 - 89)  BP: 122/66 (18 Apr 2023 12:00) (120/68 - 129/77)  BP(mean): --  RR: 19 (18 Apr 2023 12:00) (17 - 19)  SpO2: 96% (18 Apr 2023 12:00) (94% - 99%)    Parameters below as of 18 Apr 2023 12:00  Patient On (Oxygen Delivery Method): room air      PHYSICAL EXAM:  Constitutional: pleasant male resting comfortably; NAD  HEENT: NC/AT, PERRL, EOMI, anicteric sclera, dry MM  Neck: supple; no JVD or thyromegaly, no palpable lymph nodes  Respiratory: CTA B/L; no W/R/R, no retractions  Cardiac: +S1/S2; RRR; no M/R/G  Gastrointestinal: soft, NT/ND; no rebound or guarding; +BS  Extremities: WWP, no peripheral edema  Musculoskeletal: swollen fingers and joints in hands b/l possible sclerodactyly   Vascular: 2+ radial, DP/PT pulses B/L  Dermatologic: skin warm, dry, thickened  Neurologic: AAOx3, b/l  strength and UE extension 2/5, flexion 3/5 in b/l UE, unable to lift arms above head, 4/5 in b/l LE  Psychiatric: calm, cooperative, behaviors are appropriate    MEDICATIONS:  MEDICATIONS  (STANDING):  cefTRIAXone   IVPB 2000 milliGRAM(s) IV Intermittent every 24 hours  enoxaparin Injectable 40 milliGRAM(s) SubCutaneous every 24 hours  levothyroxine 50 MICROGram(s) Oral daily  Nephro-james 1 Tablet(s) Oral daily  nystatin    Suspension 323450 Unit(s) Swish and Swallow every 6 hours  pantoprazole    Tablet 40 milliGRAM(s) Oral two times a day  thiamine IVPB 500 milliGRAM(s) IV Intermittent every 8 hours    MEDICATIONS  (PRN):      ALLERGIES:  Allergies    No Known Allergies    Intolerances        LABS:                        10.2   7.75  )-----------( 406      ( 18 Apr 2023 05:30 )             29.8     04-18    135  |  100  |  15  ----------------------------<  81  3.6   |  25  |  1.50<H>    Ca    8.7      18 Apr 2023 05:30  Phos  3.4     04-18  Mg     2.0     04-18    TPro  7.3  /  Alb  2.2<L>  /  TBili  0.3  /  DBili  x   /  AST  174<H>  /  ALT  88<H>  /  AlkPhos  47  04-18    PT/INR - ( 17 Apr 2023 07:39 )   PT: 13.3 sec;   INR: 1.12          PTT - ( 17 Apr 2023 07:39 )  PTT:31.4 sec    CAPILLARY BLOOD GLUCOSE      POCT Blood Glucose.: 94 mg/dL (18 Apr 2023 13:16)      RADIOLOGY & ADDITIONAL TESTS: Reviewed.

## 2023-04-18 NOTE — DIETITIAN INITIAL EVALUATION ADULT - PROBLEM SELECTOR PLAN 6
patient presents with an areli considering no hx of ckd  likely hypovolemic as pt with poor PO intake  -f/u urine lytes  -trial 500 cc bolus last six months

## 2023-04-18 NOTE — PROGRESS NOTE ADULT - SUBJECTIVE AND OBJECTIVE BOX
UROLOGY PROGRESS NOTE    SUBJECTIVE: Patient seen and examined bedside. RAMAN ROMERO.     cefTRIAXone   IVPB 2000 milliGRAM(s) IV Intermittent every 24 hours  enoxaparin Injectable 40 milliGRAM(s) SubCutaneous every 24 hours  nystatin    Suspension 952519 Unit(s) Oral every 24 hours      Vital Signs Last 24 Hrs  T(C): 36.4 (18 Apr 2023 06:00), Max: 36.4 (17 Apr 2023 15:00)  T(F): 97.6 (18 Apr 2023 06:00), Max: 97.6 (17 Apr 2023 15:00)  HR: 89 (18 Apr 2023 06:00) (85 - 89)  BP: 120/68 (18 Apr 2023 06:00) (110/74 - 129/77)  BP(mean): 92 (17 Apr 2023 16:15) (92 - 92)  RR: 18 (18 Apr 2023 06:00) (17 - 18)  SpO2: 94% (18 Apr 2023 06:00) (94% - 99%)    Parameters below as of 18 Apr 2023 06:00  Patient On (Oxygen Delivery Method): room air      I&O's Detail      PHYSICAL EXAM    General: NAD, resting comfortably in bed  C/V: NSR  Pulm: Nonlabored breathing, no respiratory distress on room air  Abd: soft, ND/NT, no rebound tenderness, no guarding, no flank TTP  : b/l descended testicles firm to palpation bilaterally, right lower pole is softer, no tenderness bilaterally, cords palpable bilaterally. Normal uncircum penis.  Extrem: [facility]          LABS:                        10.2   7.75  )-----------( 406      ( 18 Apr 2023 05:30 )             29.8     04-17    135  |  99  |  16  ----------------------------<  82  3.6   |  26  |  1.39<H>    Ca    8.8      17 Apr 2023 07:39  Phos  3.4     04-17  Mg     2.0     04-17    TPro  7.5  /  Alb  2.3<L>  /  TBili  0.4  /  DBili  x   /  AST  180<H>  /  ALT  88<H>  /  AlkPhos  48  04-17    PT/INR - ( 17 Apr 2023 07:39 )   PT: 13.3 sec;   INR: 1.12          PTT - ( 17 Apr 2023 07:39 )  PTT:31.4 sec      RADIOLOGY & ADDITIONAL STUDIES:

## 2023-04-18 NOTE — DIETITIAN INITIAL EVALUATION ADULT - ADD RECOMMEND
1. Diet progression/text modification per SLP/MD  2. Add Ensure Enlive TID (350Kcal, 20g protein per serving) - vanilla flavor  3. Monitor PO intake; honor pt food preferences as able  4. monitor GI fnx, skin integrity, chemistry, daily wts   5. RD to remain available for recs adjustment prn or will follow up pt per organizational policy   **Signed malnutrition note**

## 2023-04-18 NOTE — DIETITIAN INITIAL EVALUATION ADULT - PROBLEM SELECTOR PLAN 1
patient presents with ongoing weakness for many months  no precipitating factor  had multiple falls over the course of a few months  does not use any devices at home to ambulate  TSH elevated on admission, however T4 normal  has neck stiffness & has been using a c collar at home for unclear reason  -f/u mr brain, mr c spine  -f/u nsgy recs   -f/u PT recs  -consult neuro in AM  -attain outside mri records  -f/u b12 folate  -possible malignancy seen on imaging with brain lesion & testicular lesion -> consider heme onc  -f/u gen surg recs  -f/u hiv testing

## 2023-04-18 NOTE — DIETITIAN INITIAL EVALUATION ADULT - PROBLEM SELECTOR PLAN 3
patient with mild leukocytosis  possible etiology aspiration pna seen on imaging  pt on room air - no cough at this time  s/p augmentin outpatient  -c/w ctx 2g

## 2023-04-19 ENCOUNTER — TRANSCRIPTION ENCOUNTER (OUTPATIENT)
Age: 68
End: 2023-04-19

## 2023-04-19 VITALS — TEMPERATURE: 100 F | HEART RATE: 84 BPM

## 2023-04-19 LAB
% ALBUMIN: 29.9 % — SIGNIFICANT CHANGE UP
% ALPHA 1: 5.1 % — SIGNIFICANT CHANGE UP
% ALPHA 2: 11.5 % — SIGNIFICANT CHANGE UP
% BETA: 19.6 % — SIGNIFICANT CHANGE UP
% GAMMA: 33.9 % — SIGNIFICANT CHANGE UP
% M SPIKE: 13.7 % — SIGNIFICANT CHANGE UP
ALBUMIN SERPL ELPH-MCNC: 2.2 G/DL — LOW (ref 3.3–5)
ALBUMIN SERPL ELPH-MCNC: 2.3 G/DL — LOW (ref 3.6–5.5)
ALBUMIN/GLOB SERPL ELPH: 0.4 RATIO — SIGNIFICANT CHANGE UP
ALP SERPL-CCNC: 49 U/L — SIGNIFICANT CHANGE UP (ref 40–120)
ALPHA1 GLOB SERPL ELPH-MCNC: 0.4 G/DL — SIGNIFICANT CHANGE UP (ref 0.1–0.4)
ALPHA2 GLOB SERPL ELPH-MCNC: 0.9 G/DL — SIGNIFICANT CHANGE UP (ref 0.5–1)
ALT FLD-CCNC: 91 U/L — HIGH (ref 10–45)
ANA PAT FLD IF-IMP: ABNORMAL
ANA TITR SER: ABNORMAL
ANION GAP SERPL CALC-SCNC: 9 MMOL/L — SIGNIFICANT CHANGE UP (ref 5–17)
AST SERPL-CCNC: 191 U/L — HIGH (ref 10–40)
B-GLOBULIN SERPL ELPH-MCNC: 1.5 G/DL — HIGH (ref 0.5–1)
BASOPHILS # BLD AUTO: 0.06 K/UL — SIGNIFICANT CHANGE UP (ref 0–0.2)
BASOPHILS NFR BLD AUTO: 0.6 % — SIGNIFICANT CHANGE UP (ref 0–2)
BILIRUB SERPL-MCNC: 0.2 MG/DL — SIGNIFICANT CHANGE UP (ref 0.2–1.2)
BUN SERPL-MCNC: 14 MG/DL — SIGNIFICANT CHANGE UP (ref 7–23)
CALCIUM SERPL-MCNC: 8.8 MG/DL — SIGNIFICANT CHANGE UP (ref 8.4–10.5)
CHLORIDE SERPL-SCNC: 101 MMOL/L — SIGNIFICANT CHANGE UP (ref 96–108)
CO2 SERPL-SCNC: 25 MMOL/L — SIGNIFICANT CHANGE UP (ref 22–31)
CREAT SERPL-MCNC: 1.59 MG/DL — HIGH (ref 0.5–1.3)
EGFR: 47 ML/MIN/1.73M2 — LOW
EOSINOPHIL # BLD AUTO: 0.08 K/UL — SIGNIFICANT CHANGE UP (ref 0–0.5)
EOSINOPHIL NFR BLD AUTO: 0.8 % — SIGNIFICANT CHANGE UP (ref 0–6)
GAMMA GLOBULIN: 2.6 G/DL — HIGH (ref 0.6–1.6)
GLUCOSE BLDC GLUCOMTR-MCNC: 128 MG/DL — HIGH (ref 70–99)
GLUCOSE BLDC GLUCOMTR-MCNC: 74 MG/DL — SIGNIFICANT CHANGE UP (ref 70–99)
GLUCOSE BLDC GLUCOMTR-MCNC: 84 MG/DL — SIGNIFICANT CHANGE UP (ref 70–99)
GLUCOSE BLDC GLUCOMTR-MCNC: 94 MG/DL — SIGNIFICANT CHANGE UP (ref 70–99)
GLUCOSE SERPL-MCNC: 119 MG/DL — HIGH (ref 70–99)
HCT VFR BLD CALC: 29.3 % — LOW (ref 39–50)
HGB BLD-MCNC: 10 G/DL — LOW (ref 13–17)
IMM GRANULOCYTES NFR BLD AUTO: 1.2 % — HIGH (ref 0–0.9)
INTERPRETATION SERPL IFE-IMP: SIGNIFICANT CHANGE UP
LYMPHOCYTES # BLD AUTO: 0.68 K/UL — LOW (ref 1–3.3)
LYMPHOCYTES # BLD AUTO: 6.5 % — LOW (ref 13–44)
M-SPIKE: 1.1 G/DL — HIGH (ref 0–0)
MCHC RBC-ENTMCNC: 23.6 PG — LOW (ref 27–34)
MCHC RBC-ENTMCNC: 34.1 GM/DL — SIGNIFICANT CHANGE UP (ref 32–36)
MCV RBC AUTO: 69.1 FL — LOW (ref 80–100)
MONOCYTES # BLD AUTO: 0.77 K/UL — SIGNIFICANT CHANGE UP (ref 0–0.9)
MONOCYTES NFR BLD AUTO: 7.4 % — SIGNIFICANT CHANGE UP (ref 2–14)
NEUTROPHILS # BLD AUTO: 8.73 K/UL — HIGH (ref 1.8–7.4)
NEUTROPHILS NFR BLD AUTO: 83.5 % — HIGH (ref 43–77)
NRBC # BLD: 0 /100 WBCS — SIGNIFICANT CHANGE UP (ref 0–0)
PLATELET # BLD AUTO: 378 K/UL — SIGNIFICANT CHANGE UP (ref 150–400)
POTASSIUM SERPL-MCNC: 4 MMOL/L — SIGNIFICANT CHANGE UP (ref 3.5–5.3)
POTASSIUM SERPL-SCNC: 4 MMOL/L — SIGNIFICANT CHANGE UP (ref 3.5–5.3)
PROT PATTERN SERPL ELPH-IMP: SIGNIFICANT CHANGE UP
PROT SERPL-MCNC: 7.3 G/DL — SIGNIFICANT CHANGE UP (ref 6–8.3)
PROT SERPL-MCNC: 7.7 G/DL — SIGNIFICANT CHANGE UP (ref 6–8.3)
PROT SERPL-MCNC: 7.7 G/DL — SIGNIFICANT CHANGE UP (ref 6–8.3)
RBC # BLD: 4.24 M/UL — SIGNIFICANT CHANGE UP (ref 4.2–5.8)
RBC # FLD: 20.9 % — HIGH (ref 10.3–14.5)
SODIUM SERPL-SCNC: 135 MMOL/L — SIGNIFICANT CHANGE UP (ref 135–145)
WBC # BLD: 10.45 K/UL — SIGNIFICANT CHANGE UP (ref 3.8–10.5)
WBC # FLD AUTO: 10.45 K/UL — SIGNIFICANT CHANGE UP (ref 3.8–10.5)

## 2023-04-19 PROCEDURE — 87899 AGENT NOS ASSAY W/OPTIC: CPT

## 2023-04-19 PROCEDURE — 83735 ASSAY OF MAGNESIUM: CPT

## 2023-04-19 PROCEDURE — 70450 CT HEAD/BRAIN W/O DYE: CPT | Mod: MA

## 2023-04-19 PROCEDURE — 92610 EVALUATE SWALLOWING FUNCTION: CPT

## 2023-04-19 PROCEDURE — 86803 HEPATITIS C AB TEST: CPT

## 2023-04-19 PROCEDURE — 82533 TOTAL CORTISOL: CPT

## 2023-04-19 PROCEDURE — 87507 IADNA-DNA/RNA PROBE TQ 12-25: CPT

## 2023-04-19 PROCEDURE — 82728 ASSAY OF FERRITIN: CPT

## 2023-04-19 PROCEDURE — 85652 RBC SED RATE AUTOMATED: CPT

## 2023-04-19 PROCEDURE — 70491 CT SOFT TISSUE NECK W/DYE: CPT | Mod: MA

## 2023-04-19 PROCEDURE — 86901 BLOOD TYPING SEROLOGIC RH(D): CPT

## 2023-04-19 PROCEDURE — 80048 BASIC METABOLIC PNL TOTAL CA: CPT

## 2023-04-19 PROCEDURE — 36415 COLL VENOUS BLD VENIPUNCTURE: CPT

## 2023-04-19 PROCEDURE — 80074 ACUTE HEPATITIS PANEL: CPT

## 2023-04-19 PROCEDURE — C8929: CPT

## 2023-04-19 PROCEDURE — 92611 MOTION FLUOROSCOPY/SWALLOW: CPT

## 2023-04-19 PROCEDURE — 82105 ALPHA-FETOPROTEIN SERUM: CPT

## 2023-04-19 PROCEDURE — 83516 IMMUNOASSAY NONANTIBODY: CPT

## 2023-04-19 PROCEDURE — 85384 FIBRINOGEN ACTIVITY: CPT

## 2023-04-19 PROCEDURE — 84132 ASSAY OF SERUM POTASSIUM: CPT

## 2023-04-19 PROCEDURE — 86334 IMMUNOFIX E-PHORESIS SERUM: CPT

## 2023-04-19 PROCEDURE — 86359 T CELLS TOTAL COUNT: CPT

## 2023-04-19 PROCEDURE — 99222 1ST HOSP IP/OBS MODERATE 55: CPT

## 2023-04-19 PROCEDURE — 82955 ASSAY OF G6PD ENZYME: CPT

## 2023-04-19 PROCEDURE — 74230 X-RAY XM SWLNG FUNCJ C+: CPT

## 2023-04-19 PROCEDURE — 82746 ASSAY OF FOLIC ACID SERUM: CPT

## 2023-04-19 PROCEDURE — 82607 VITAMIN B-12: CPT

## 2023-04-19 PROCEDURE — 85027 COMPLETE CBC AUTOMATED: CPT

## 2023-04-19 PROCEDURE — 80053 COMPREHEN METABOLIC PANEL: CPT

## 2023-04-19 PROCEDURE — 86038 ANTINUCLEAR ANTIBODIES: CPT

## 2023-04-19 PROCEDURE — 81001 URINALYSIS AUTO W/SCOPE: CPT

## 2023-04-19 PROCEDURE — 86480 TB TEST CELL IMMUN MEASURE: CPT

## 2023-04-19 PROCEDURE — 85730 THROMBOPLASTIN TIME PARTIAL: CPT

## 2023-04-19 PROCEDURE — 76870 US EXAM SCROTUM: CPT

## 2023-04-19 PROCEDURE — 85045 AUTOMATED RETICULOCYTE COUNT: CPT

## 2023-04-19 PROCEDURE — 96374 THER/PROPH/DIAG INJ IV PUSH: CPT

## 2023-04-19 PROCEDURE — 99285 EMERGENCY DEPT VISIT HI MDM: CPT | Mod: 25

## 2023-04-19 PROCEDURE — 86225 DNA ANTIBODY NATIVE: CPT

## 2023-04-19 PROCEDURE — 87449 NOS EACH ORGANISM AG IA: CPT

## 2023-04-19 PROCEDURE — 83615 LACTATE (LD) (LDH) ENZYME: CPT

## 2023-04-19 PROCEDURE — 74177 CT ABD & PELVIS W/CONTRAST: CPT | Mod: MA

## 2023-04-19 PROCEDURE — 84478 ASSAY OF TRIGLYCERIDES: CPT

## 2023-04-19 PROCEDURE — 84443 ASSAY THYROID STIM HORMONE: CPT

## 2023-04-19 PROCEDURE — 85610 PROTHROMBIN TIME: CPT

## 2023-04-19 PROCEDURE — 83521 IG LIGHT CHAINS FREE EACH: CPT

## 2023-04-19 PROCEDURE — 86431 RHEUMATOID FACTOR QUANT: CPT

## 2023-04-19 PROCEDURE — 85025 COMPLETE CBC W/AUTO DIFF WBC: CPT

## 2023-04-19 PROCEDURE — 80076 HEPATIC FUNCTION PANEL: CPT

## 2023-04-19 PROCEDURE — 84100 ASSAY OF PHOSPHORUS: CPT

## 2023-04-19 PROCEDURE — 86360 T CELL ABSOLUTE COUNT/RATIO: CPT

## 2023-04-19 PROCEDURE — 84704 HCG FREE BETACHAIN TEST: CPT

## 2023-04-19 PROCEDURE — 83550 IRON BINDING TEST: CPT

## 2023-04-19 PROCEDURE — 87389 HIV-1 AG W/HIV-1&-2 AB AG IA: CPT

## 2023-04-19 PROCEDURE — 82550 ASSAY OF CK (CPK): CPT

## 2023-04-19 PROCEDURE — A9585: CPT

## 2023-04-19 PROCEDURE — 82962 GLUCOSE BLOOD TEST: CPT

## 2023-04-19 PROCEDURE — 82784 ASSAY IGA/IGD/IGG/IGM EACH: CPT

## 2023-04-19 PROCEDURE — 83540 ASSAY OF IRON: CPT

## 2023-04-19 PROCEDURE — 97530 THERAPEUTIC ACTIVITIES: CPT

## 2023-04-19 PROCEDURE — 84145 PROCALCITONIN (PCT): CPT

## 2023-04-19 PROCEDURE — 86140 C-REACTIVE PROTEIN: CPT

## 2023-04-19 PROCEDURE — 99239 HOSP IP/OBS DSCHRG MGMT >30: CPT

## 2023-04-19 PROCEDURE — 71260 CT THORAX DX C+: CPT | Mod: MA

## 2023-04-19 PROCEDURE — 83020 HEMOGLOBIN ELECTROPHORESIS: CPT

## 2023-04-19 PROCEDURE — 72156 MRI NECK SPINE W/O & W/DYE: CPT

## 2023-04-19 PROCEDURE — 84436 ASSAY OF TOTAL THYROXINE: CPT

## 2023-04-19 PROCEDURE — 76705 ECHO EXAM OF ABDOMEN: CPT

## 2023-04-19 PROCEDURE — 97161 PT EVAL LOW COMPLEX 20 MIN: CPT

## 2023-04-19 PROCEDURE — 86900 BLOOD TYPING SEROLOGIC ABO: CPT

## 2023-04-19 PROCEDURE — 86850 RBC ANTIBODY SCREEN: CPT

## 2023-04-19 PROCEDURE — 84165 PROTEIN E-PHORESIS SERUM: CPT

## 2023-04-19 PROCEDURE — 88305 TISSUE EXAM BY PATHOLOGIST: CPT

## 2023-04-19 PROCEDURE — 93970 EXTREMITY STUDY: CPT

## 2023-04-19 PROCEDURE — 86235 NUCLEAR ANTIGEN ANTIBODY: CPT

## 2023-04-19 PROCEDURE — 84155 ASSAY OF PROTEIN SERUM: CPT

## 2023-04-19 PROCEDURE — 93975 VASCULAR STUDY: CPT

## 2023-04-19 PROCEDURE — 97110 THERAPEUTIC EXERCISES: CPT

## 2023-04-19 PROCEDURE — 83880 ASSAY OF NATRIURETIC PEPTIDE: CPT

## 2023-04-19 PROCEDURE — 82785 ASSAY OF IGE: CPT

## 2023-04-19 PROCEDURE — 86780 TREPONEMA PALLIDUM: CPT

## 2023-04-19 PROCEDURE — 84439 ASSAY OF FREE THYROXINE: CPT

## 2023-04-19 PROCEDURE — 87324 CLOSTRIDIUM AG IA: CPT

## 2023-04-19 PROCEDURE — 74220 X-RAY XM ESOPHAGUS 1CNTRST: CPT

## 2023-04-19 PROCEDURE — 70553 MRI BRAIN STEM W/O & W/DYE: CPT

## 2023-04-19 PROCEDURE — 87635 SARS-COV-2 COVID-19 AMP PRB: CPT

## 2023-04-19 PROCEDURE — 84550 ASSAY OF BLOOD/URIC ACID: CPT

## 2023-04-19 PROCEDURE — 87536 HIV-1 QUANT&REVRSE TRNSCRPJ: CPT

## 2023-04-19 PROCEDURE — 84166 PROTEIN E-PHORESIS/URINE/CSF: CPT

## 2023-04-19 RX ORDER — PANTOPRAZOLE SODIUM 20 MG/1
40 TABLET, DELAYED RELEASE ORAL
Refills: 0 | Status: DISCONTINUED | OUTPATIENT
Start: 2023-04-19 | End: 2023-04-19

## 2023-04-19 RX ORDER — LEVOTHYROXINE SODIUM 125 MCG
1 TABLET ORAL
Qty: 30 | Refills: 3
Start: 2023-04-19 | End: 2023-08-16

## 2023-04-19 RX ORDER — HYDROCHLOROTHIAZIDE 25 MG
1 TABLET ORAL
Refills: 0 | DISCHARGE

## 2023-04-19 RX ORDER — PANTOPRAZOLE SODIUM 20 MG/1
1 TABLET, DELAYED RELEASE ORAL
Qty: 30 | Refills: 3
Start: 2023-04-19 | End: 2023-08-16

## 2023-04-19 RX ORDER — METOPROLOL TARTRATE 50 MG
1 TABLET ORAL
Refills: 0 | DISCHARGE

## 2023-04-19 RX ADMIN — CEFTRIAXONE 100 MILLIGRAM(S): 500 INJECTION, POWDER, FOR SOLUTION INTRAMUSCULAR; INTRAVENOUS at 04:05

## 2023-04-19 RX ADMIN — Medication 500000 UNIT(S): at 11:46

## 2023-04-19 RX ADMIN — Medication 1 TABLET(S): at 11:45

## 2023-04-19 RX ADMIN — Medication 50 MICROGRAM(S): at 06:19

## 2023-04-19 RX ADMIN — Medication 105 MILLIGRAM(S): at 06:20

## 2023-04-19 RX ADMIN — Medication 500000 UNIT(S): at 00:02

## 2023-04-19 RX ADMIN — Medication 500000 UNIT(S): at 06:20

## 2023-04-19 RX ADMIN — PANTOPRAZOLE SODIUM 40 MILLIGRAM(S): 20 TABLET, DELAYED RELEASE ORAL at 06:19

## 2023-04-19 NOTE — PROGRESS NOTE ADULT - PROBLEM SELECTOR PLAN 9
Patient p/w weakness; TSH 18.86, free thyroxine 0.712  - start levothyroxine 50 mcg  - endo consulted
Pt with hx of progressive edema in b/l upper lower extremities and facial area. Was on lasix which helped but stopped seeing PCP after COVID. Has started following with cardiologist, Dr. Nolen, who he also treats as his PCP. Has had an echo in the past which he believes was normal.  on admission. Home medications: HCTZ 12.5, Metoprolol Tartrate 25mg BID  - no significant LE edema on exam, BL upper extremity edema  - f/u tte  - LE dopplers without evidence of DVT   - collateral from cardiologist/PCP Dr. Ruperto Meyer [phone]
Pt with hx of progressive edema in b/l upper lower extremities and facial area. Was on lasix which helped but stopped seeing PCP after COVID. Has started following with cardiologist, Dr. Nolen, who he also treats as his PCP. Has had an echo in the past which he believes was normal.  on admission. Home medications: HCTZ 12.5, Metoprolol Tartrate 25mg BID  - no significant LE edema on exam, BL upper extremity edema  - f/u tte  - LE dopplers without evidence of DVT   - collateral from cardiologist/PCP Dr. Ruperto Meyer [phone]
Patient p/w weakness; TSH 18.86, free thyroxine 0.712  - start levothyroxine 50 mcg  - endo consulted
Pt with hx of progressive edema in b/l upper lower extremities and facial area. Was on lasix which helped but stopped seeing PCP after COVID. Has started following with cardiologist, Dr. Nolen, who he also treats as his PCP. Has had an echo in the past which he believes was normal.  on admission. Home medications: HCTZ 12.5, Metoprolol Tartrate 25mg BID  - no significant LE edema on exam, BL upper extremity edema  - f/u tte  - LE dopplers without evidence of DVT   - collateral from cardiologist/PCP Dr. Ruperto Meyer [phone]
Pt with hx of progressive edema in b/l upper lower extremities and facial area. Was on lasix which helped but stopped seeing PCP after COVID. Has started following with cardiologist, Dr. Nolen, who he also treats as his PCP. Has had an echo in the past which he believes was normal.  on admission. Home medications: HCTZ 12.5, Metoprolol Tartrate 25mg BID  - no significant LE edema on exam, BL upper extremity edema  - f/u tte  - f/u b/l le dopplers  - collateral from cardiologist/PCP Dr. Ruperto Meyer [phone]

## 2023-04-19 NOTE — DISCHARGE NOTE NURSING/CASE MANAGEMENT/SOCIAL WORK - PATIENT PORTAL LINK FT
You can access the FollowMyHealth Patient Portal offered by [facility] by registering at the following website: [email or web address]. By joining [facility]’s FollowMyHealth portal, you will also be able to view your health information using other applications (apps) compatible with our system.

## 2023-04-19 NOTE — CONSULT NOTE ADULT - ATTENDING COMMENTS
patient with known scleroderma which could explain his condition completely. However, reasonable to exclude other neurological problems.  Problems have been chronic, so outpatient workup is reasonable    check labs for myasthenic syndromes and myopathy and check gangiosdies  will expedite outpatient EMG/NCS for this friday

## 2023-04-19 NOTE — PROGRESS NOTE ADULT - PROVIDER SPECIALTY LIST ADULT
Gastroenterology
Gastroenterology
Internal Medicine
Internal Medicine
Urology
Gastroenterology
Internal Medicine
Internal Medicine
Urology
Surgery
Internal Medicine
Rehab Medicine
Surgery
Surgery
Internal Medicine

## 2023-04-19 NOTE — PROGRESS NOTE ADULT - PROBLEM SELECTOR PROBLEM 8
Pneumonia, aspiration

## 2023-04-19 NOTE — PROGRESS NOTE ADULT - PROBLEM SELECTOR PLAN 8
Patient with b/l GGO seen on ctscan, consistent with likely aspiration pna. Patient also with dysphagia. Received augmentin 14d in Jan but did not complete course. NPO    # likely gram negative pneumonia   continue treating gram negatives with ceftriaxone  complete 3 days of azithromycin  - continue dysphagia workup   - c/w CTX 2g IV qD (4/14- )  - ctm oxygen requirements
Patient with b/l GGO seen on ctscan, consistent with likely aspiration pna. Patient also with dysphagia. Received augmentin 14d in Jan but did not complete course. Likely gram negative pneumonia. Continue treating gram negatives with ceftriaxone.   - S/p 3 days of azithromycin  - C/w CTX 2g IV qD (4/14- )  - continue dysphagia workup
Patient with b/l GGO seen on ctscan, consistent with likely aspiration pna. Patient also with dysphagia. Received augmentin 14d in Jan but did not complete course. NPO    # likely gram negative pneumonia   continue treating gram negatives with ceftriaxone  complete 3 days of azithromycin  - continue dysphagia workup   - c/w CTX 2g IV qD (4/14- )  - ctm oxygen requirements
Patient with b/l GGO seen on ctscan, consistent with likely aspiration pna. Patient also with dysphagia. Received augmentin 14d in Jan but did not complete course. NPO    # likely gram negative pneumonia   continue treating gram negatives with ceftriaxone  complete 3 days of azithromycin  - continue dysphagia workup   - c/w CTX 2g IV qD (4/14- )  - ctm oxygen requirements
Patient with b/l GGO seen on ctscan, consistent with likely aspiration pna. Patient also with dysphagia. Received augmentin 14d in Jan but did not complete course. Likely gram negative pneumonia. Continue treating gram negatives with ceftriaxone.   - S/p 3 days of azithromycin  - C/w CTX 2g IV qD (4/14- )  - continue dysphagia workup
Patient with b/l GGO seen on ctscan, consistent with likely aspiration pna. Patient also with dysphagia. Received augmentin 14d in Jan but did not complete course. NPO  - S+S eval  - c/w CTX 2g IV qD (4/14- )  - ctm oxygen requirements

## 2023-04-19 NOTE — CONSULT NOTE ADULT - ASSESSMENT
67yo M no significant PMH presented w/ generalized weakness, body pain and dysphagia x6M and gradual 50lb weight loss, admitted for FTT and dysphagia work up, found to have mixed connective tissue disease vs scleroderma. neurology consulted for generalized weakness    Patient found to have significant cervical weakness on exam making neuromuscular disorders vs demyelinating disease such as ALS higher on the differential.  -please send: serum paraneoplastic antibody, aldolase, creatinine kinase, acetylcholine binding blocking antibody , acetylcholine modulating antibody and gangliocyte antibody  -will plan for tentative EMG 4/20    recommendations are not final until attending attestation

## 2023-04-19 NOTE — PROGRESS NOTE ADULT - PROBLEM SELECTOR PROBLEM 6
OLGA (acute kidney injury)

## 2023-04-19 NOTE — PROGRESS NOTE ADULT - PROBLEM SELECTOR PLAN 10
Pt with hx of progressive edema in b/l upper lower extremities and facial area. Was on lasix which helped but stopped seeing PCP after COVID. Has started following with cardiologist, Dr. Nolen, who he also treats as his PCP. Has had an echo in the past which he believes was normal.  on admission. Home medications: HCTZ 12.5, Metoprolol Tartrate 25mg BID  - no significant LE edema on exam, BL upper extremity edema  - TTE showing: PASP 34 mmHg, moderate pericardial effusion without tamponade physiology. Right pleural effusion.  - LE dopplers without evidence of DVT   - collateral from cardiologist/PCP Dr. Ruperto Meyer [phone]

## 2023-04-19 NOTE — CONSULT NOTE ADULT - SUBJECTIVE AND OBJECTIVE BOX
Neurology consult - INCOMPLETE     TRE BRENNAN68yMale    HPI:  69yo M here w/ c/o generalized weakness, body pain, and unable to swallow x 6 months with a gradual 50lb weight loss. All of his issues started 1 year ago per pt he started to have lower extremity swelling, was started on lasix and subsequently lost to f/u so stoppe dhis lasix and then started to have facial swelling 4 months ago. He said he had a sonogram of his heart and was told something was abnormal but does not remember what.  After that he had difficulty swallowing solid foods which has progressed to sometimes thin foods as well. He saw his gastroenterologist Dr Meier who told him he was having aspiration but he never had imaging or work up. He took antibiotics for 'aspiration' but said that they did not help because he never had much of a cough or fever.  He was sent to Dr. Quiñones today but at [facility] recommended to come to the ED for inpatient workup. He has been having falls about 3 for the past few months. States had spine imaging that he did not bring of his thoracic spine that was totally normal, weakness attributed to dehydation/weight loss not spinal. Pt denies any fever, chills, n/v, diarrhea/ constipation.      In the ED:  Vitals 98.6  /77 RA 95%   Labs: WBC 11.99 Hg 10.4 Plt 453 PT 13.5 INR 1.13 Na 133 K 4.4 Cl 97 Bicarb 26 AG 22 Cr 1.36   TSH 12.64   Imaging: CTAP  1.  Mild distention of the esophagus with layering fluid, with multifocal   patchy consolidative and groundglass opacities bilaterally, most likely   representing aspiration pneumonia.  2.  Partially visualized indeterminate 1.7 cm hyperdense lesion in the   right hemiscrotum, suspicious for malignancy. Recommend further   evaluation with scrotal ultrasound.  3.  Circumferential wall thickening of the urinary bladder, possibly   reflecting under distention, although correlation with urinalysis is   recommended to exclude underlying acute infectious/inflammatory cystitis.  4.  Diffuse subcutaneous edema.  Ct soft tissue   1.  Limited intracranial images demonstrate questionable 1.1 cm   rim-enhancing hypodense lesion in the posterior left parietal lobe seen   only on axial images. Differential includes malignancy/metastasis versus   less likely infection in this assumed immunocompetent patient. Recommend   further evaluation with contrast-enhanced MRI of the brain.  2.  Layering fluid within the esophagus with patchy consolidations in the   included bilateral upper lobes, most likely representing aspiration.   (14 Apr 2023 02:32)          MEDICATIONS    cefTRIAXone   IVPB 2000 milliGRAM(s) IV Intermittent every 24 hours  enoxaparin Injectable 40 milliGRAM(s) SubCutaneous every 24 hours  levothyroxine 50 MICROGram(s) Oral daily  Nephro-james 1 Tablet(s) Oral daily  nystatin    Suspension 655459 Unit(s) Swish and Swallow every 6 hours  pantoprazole    Tablet 40 milliGRAM(s) Oral two times a day  thiamine IVPB 500 milliGRAM(s) IV Intermittent every 8 hours         Family history: No history of dementia, strokes, or seizures   FAMILY HISTORY:    SOCIAL HISTORY -- No history of tobacco or alcohol use     Allergies    No Known Allergies    Intolerances            Vital Signs Last 24 Hrs  T(C): 37.1 (19 Apr 2023 11:50), Max: 37.3 (19 Apr 2023 06:38)  T(F): 98.8 (19 Apr 2023 11:50), Max: 99.2 (19 Apr 2023 06:38)  HR: 96 (19 Apr 2023 11:50) (88 - 110)  BP: 115/68 (19 Apr 2023 11:50) (110/61 - 117/79)  BP(mean): --  RR: 17 (19 Apr 2023 11:50) (17 - 18)  SpO2: 95% (19 Apr 2023 11:50) (92% - 98%)    Parameters below as of 19 Apr 2023 11:50  Patient On (Oxygen Delivery Method): room air          REVIEW OF SYSTEMS:    Constitutional: No fever, chills, fatigue, weakness  Eyes: no eye pain, visual disturbances, or discharge  ENT:  No difficulty hearing, tinnitus, vertigo; No sinus or throat pain  Neck: No pain or stiffness  Respiratory: No cough, dyspnea, wheezing   Cardiovascular: No chest pain, palpitations,   Gastrointestinal: No abdominal or epigastric pain. No nausea, vomiting  No diarrhea or constipation.   Genitourinary: No dysuria, frequency, hematuria or incontinence  Neurological: No headaches, lightheadedness, vertigo, numbness or tremors  Psychiatric: No depression, anxiety, mood swings or difficulty sleeping  Musculoskeletal: No joint pain or swelling; No muscle, back or extremity pain  Skin: No itching, burning, rashes or lesions   Lymph Nodes: No enlarged glands  Endocrine: No heat or cold intolerance; No hair loss, No h/o diabetes or thyroid dysfunction  Allergy and Immunologic: No hives or eczema    On Neurological Examination:    Mental Status - Patient is alert, awake, oriented X3. Confused Dementia Lethargic .     Follows commands well and able to answer questions appropriately. Mood and affect  normal  Follow simple commands  Follow complex commands  Does not follow commands    Speech -   Fluent    Dysarthria  Aphasia                              Cranial Nerves - Pupils 3 mm equal and reactive to light,   extraocular eye movements intact.     Motor Exam -   Right upper  Left upper  Right lower  Left lower     With stimuli positive movement of all 4 extremities    Muscle tone - is normal all over. No asymmetry is seen.      Sensory    Bilateral intact to light touch    Gait -  normal  ataxia     GENERAL Exam:     Nontoxic , No Acute Distress   	  HEENT:  normocephalic, atraumatic  		  LUNGS:	Clear bilaterally  No Wheeze  Decreased bilaterally  	  HEART:	 Normal S1S2   No murmur RRR        	  GI/ ABDOMEN:  Soft  Non tender    EXTREMITIES:   No Edema  No Clubbing  No Cyanosis No Edema    MUSCULOSKELETAL: Normal Range of Motion  	   SKIN:      Normal   No Ecchymosis               LABS:  CBC Full  -  ( 18 Apr 2023 05:30 )  WBC Count : 7.75 K/uL  RBC Count : 4.28 M/uL  Hemoglobin : 10.2 g/dL  Hematocrit : 29.8 %  Platelet Count - Automated : 406 K/uL  Mean Cell Volume : 69.6 fl  Mean Cell Hemoglobin : 23.8 pg  Mean Cell Hemoglobin Concentration : 34.2 gm/dL  Auto Neutrophil # : x  Auto Lymphocyte # : x  Auto Monocyte # : x  Auto Eosinophil # : x  Auto Basophil # : x  Auto Neutrophil % : x  Auto Lymphocyte % : x  Auto Monocyte % : x  Auto Eosinophil % : x  Auto Basophil % : x      04-18    135  |  100  |  15  ----------------------------<  81  3.6   |  25  |  1.50<H>    Ca    8.7      18 Apr 2023 05:30  Phos  3.4     04-18  Mg     2.0     04-18    TPro  7.3  /  Alb  2.2<L>  /  TBili  0.3  /  DBili  x   /  AST  174<H>  /  ALT  88<H>  /  AlkPhos  47  04-18    Hemoglobin A1C:     LIVER FUNCTIONS - ( 18 Apr 2023 05:30 )  Alb: 2.2 g/dL / Pro: 7.3 g/dL / ALK PHOS: 47 U/L / ALT: 88 U/L / AST: 174 U/L / GGT: x           Vitamin B12         RADIOLOGY    EKG                     Neurology consult     TRE BRENNAN68yMale    HPI:  67yo M here w/ c/o generalized weakness, body pain, and unable to swallow x 6 months with a gradual 50lb weight loss. All of his issues started 1 year ago per pt he started to have lower extremity swelling, was started on lasix and subsequently lost to f/u so stoppe dhis lasix and then started to have facial swelling 4 months ago. He said he had a sonogram of his heart and was told something was abnormal but does not remember what.  After that he had difficulty swallowing solid foods which has progressed to sometimes thin foods as well. He saw his gastroenterologist Dr Meier who told him he was having aspiration but he never had imaging or work up. He took antibiotics for 'aspiration' but said that they did not help because he never had much of a cough or fever.  He was [facility] to Dr. [facility]ones [facility]. He has been having falls about 3 for the past few months. States had spine imaging that he did not bring of his thoracic spine that was totally normal, weakness attributed to dehydation/weight loss not spinal. Pt denies any fever, chills, n/v, diarrhea/ constipation.      In the ED:  Vitals 98.6  /77 RA 95%   Labs: WBC 11.99 Hg 10.4 Plt 453 PT 13.5 INR 1.13 Na 133 K 4.4 Cl 97 Bicarb 26 AG 22 Cr 1.36   TSH 12.64   Imaging: CTAP  1.  Mild distention of the esophagus with layering fluid, with multifocal   patchy consolidative and groundglass opacities bilaterally, most likely   representing aspiration pneumonia.  2.  Partially visualized indeterminate 1.7 cm hyperdense lesion in the   right hemiscrotum, suspicious for malignancy. Recommend further   evaluation with scrotal ultrasound.  3.  Circumferential wall thickening of the urinary bladder, possibly   reflecting under distention, although correlation with urinalysis is   recommended to exclude underlying acute infectious/inflammatory cystitis.  4.  Diffuse subcutaneous edema.  Ct soft tissue   1.  Limited intracranial images demonstrate questionable 1.1 cm   rim-enhancing hypodense lesion in the posterior left parietal lobe seen   only on axial images. Differential includes malignancy/metastasis versus   less likely infection in this assumed immunocompetent patient. Recommend   further evaluation with contrast-enhanced MRI of the brain.  2.  Layering fluid within the esophagus with patchy consolidations in the   included bilateral upper lobes, most likely representing aspiration.   (14 Apr 2023 02:32)          MEDICATIONS    cefTRIAXone   IVPB 2000 milliGRAM(s) IV Intermittent every 24 hours  enoxaparin Injectable 40 milliGRAM(s) SubCutaneous every 24 hours  levothyroxine 50 MICROGram(s) Oral daily  Nephro-james 1 Tablet(s) Oral daily  nystatin    Suspension [id number] Unit(s) Swish and Swallow every 6 hours  pantoprazole    Tablet 40 milliGRAM(s) Oral two times a day  thiamine IVPB 500 milliGRAM(s) IV Intermittent every 8 hours         Family history: No history of dementia, strokes, or seizures   FAMILY HISTORY:    SOCIAL HISTORY -- No history of tobacco or alcohol use     Allergies    No Known Allergies    Intolerances            Vital Signs Last 24 Hrs  T(C): 37.1 (19 Apr 2023 11:50), Max: 37.3 (19 Apr 2023 06:38)  T(F): 98.8 (19 Apr 2023 11:50), Max: 99.2 (19 Apr 2023 06:38)  HR: 96 (19 Apr 2023 11:50) (88 - 110)  BP: 115/68 (19 Apr 2023 11:50) (110/61 - 117/79)  BP(mean): --  RR: 17 (19 Apr 2023 11:50) (17 - 18)  SpO2: 95% (19 Apr 2023 11:50) (92% - 98%)    Parameters below as of 19 Apr 2023 11:50  Patient On (Oxygen Delivery Method): room air          REVIEW OF SYSTEMS:    Constitutional: No fever, chills, fatigue, weakness  Eyes: no eye pain, visual disturbances, or discharge  ENT:  No difficulty hearing, tinnitus, vertigo; No sinus or throat pain  Neck: No pain or stiffness  Respiratory: No cough, dyspnea, wheezing   Cardiovascular: No chest pain, palpitations,   Gastrointestinal: No abdominal or epigastric pain. No nausea, vomiting  No diarrhea or constipation.   Genitourinary: No dysuria, frequency, hematuria or incontinence  Neurological: No headaches, lightheadedness, vertigo, numbness or tremors  Psychiatric: No depression, anxiety, mood swings or difficulty sleeping  Musculoskeletal: No joint pain or swelling; No muscle, back or extremity pain  Skin: No itching, burning, rashes or lesions   Lymph Nodes: No enlarged glands  Endocrine: No heat or cold intolerance; No hair loss, No h/o diabetes or thyroid dysfunction  Allergy and Immunologic: No hives or eczema    On Neurological Examination:    Mental Status - Patient is alert, awake, oriented X3. Confused Dementia Lethargic .     Follows commands well and able to answer questions appropriately. Mood and affect  normal  Follow simple commands  Follow complex commands  Does not follow commands    Speech -   Fluent    Dysarthria  Aphasia                              Cranial Nerves - Pupils 3 mm equal and reactive to light,   extraocular eye movements intact.     Motor Exam -   Right upper  Left upper  Right lower  Left lower     With stimuli positive movement of all 4 extremities    Muscle tone - is normal all over. No asymmetry is seen.      Sensory    Bilateral intact to light touch    Gait -  normal  ataxia     GENERAL Exam:     Nontoxic , No Acute Distress   	  HEENT:  normocephalic, atraumatic  		  LUNGS:	Clear bilaterally  No Wheeze  Decreased bilaterally  	  HEART:	 Normal S1S2   No murmur RRR        	  GI/ ABDOMEN:  Soft  Non tender    EXTREMITIES:   No Edema  No Clubbing  No Cyanosis No Edema    MUSCULOSKELETAL: Normal Range of Motion  	   SKIN:      Normal   No Ecchymosis               LABS:  CBC Full  -  ( 18 Apr 2023 05:30 )  WBC Count : 7.75 K/uL  RBC Count : 4.28 M/uL  Hemoglobin : 10.2 g/dL  Hematocrit : 29.8 %  Platelet Count - Automated : 406 K/uL  Mean Cell Volume : 69.6 fl  Mean Cell Hemoglobin : 23.8 pg  Mean Cell Hemoglobin Concentration : 34.2 gm/dL  Auto Neutrophil # : x  Auto Lymphocyte # : x  Auto Monocyte # : x  Auto Eosinophil # : x  Auto Basophil # : x  Auto Neutrophil % : x  Auto Lymphocyte % : x  Auto Monocyte % : x  Auto Eosinophil % : x  Auto Basophil % : x      04-18    135  |  100  |  15  ----------------------------<  81  3.6   |  25  |  1.50<H>    Ca    8.7      18 Apr 2023 05:30  Phos  3.4     04-18  Mg     2.0     04-18    TPro  7.3  /  Alb  2.2<L>  /  TBili  0.3  /  DBili  x   /  AST  174<H>  /  ALT  88<H>  /  AlkPhos  47  04-18    Hemoglobin A1C:     LIVER FUNCTIONS - ( 18 Apr 2023 05:30 )  Alb: 2.2 g/dL / Pro: 7.3 g/dL / ALK PHOS: 47 U/L / ALT: 88 U/L / AST: 174 U/L / GGT: x           Vitamin B12         RADIOLOGY    EKG

## 2023-04-19 NOTE — DISCHARGE NOTE NURSING/CASE MANAGEMENT/SOCIAL WORK - NSDCFUADDAPPT_GEN_ALL_CORE_FT
Please follow up with Dr. Dixon Reyes (Neurology) on 4/21/23 at 3:00PM.     Please follow up with your primary care DrBrinda Meyer on 4/24/23 at 12:00PM.    Please follow up with Dr. Loren Hodges (Rheumatology) on 4/26/23 at 12:00PM.    Please follow up with Dr. Kalyn Miles (Hematology/Oncology) on 5/10/23 at 3:00PM.     Please follow up with Dr. Lynda Du (Urology) on 5/18/23 at 9:30AM.     Please contact [facility] at [phone] to set up an appointment with the Endocrinology doctor for your hypothyroidism, as discussed.    Dr. Marbin Rae's (Gastroenterology) office will reach out to you to schedule a follow up appointment.

## 2023-04-19 NOTE — PROGRESS NOTE ADULT - NUTRITIONAL ASSESSMENT
This patient has been assessed with a concern for Malnutrition and has been determined to have a diagnosis/diagnoses of Severe protein-calorie malnutrition.    This patient is being managed with:   Diet Soft and Bite Sized-  Supplement Feeding Modality:  Oral  Ensure Enlive Cans or Servings Per Day:  1       Frequency:  Three Times a day  Entered: Apr 19 2023 11:26AM  
This patient has been assessed with a concern for Malnutrition and has been determined to have a diagnosis/diagnoses of Severe protein-calorie malnutrition.    This patient is being managed with:   Diet Soft and Bite Sized-  Supplement Feeding Modality:  Oral  Ensure Enlive Cans or Servings Per Day:  1       Frequency:  Three Times a day  Entered: Apr 19 2023 11:26AM

## 2023-04-19 NOTE — PROGRESS NOTE ADULT - PROBLEM SELECTOR PLAN 11
Patient p/w weakness; TSH 18.86, free thyroxine 0.712  - start levothyroxine 50 mcg  - endo consulted
F: none  E: replete as needed  N: Full liquid diet  DVT ppx: Lovenox 40 qd  GI ppx: Protonix BID    Dispo: Home with home PT  Code: FULL
Patient p/w weakness; TSH 18.86, free thyroxine 0.712  - start levothyroxine 50 mcg  - endo consulted
F: none  E: replete as needed  N: Full liquid diet  DVT ppx: Lovenox 40 qd  GI ppx: Protonix BID    Dispo: Home with home PT  Code: FULL

## 2023-04-19 NOTE — PROGRESS NOTE ADULT - PROBLEM SELECTOR PLAN 3
Patient with mild leukocytosis. Possible etiology aspiration pna seen on imaging. Pt on room air - no cough at this time. Pt w/o fevers or other infectious sxs. UA neg. COVID neg.   -c/w tx for possible aspiration PNA as below
Patient with mild leukocytosis. Possible etiology aspiration pna seen on imaging. Pt on room air - no cough at this time. Pt w/o fevers or other infectious sxs. UA neg. COVID neg.   - c/w tx for possible aspiration PNA as below
Patient with mild leukocytosis. Possible etiology aspiration pna seen on imaging. Pt on room air - no cough at this time. Pt w/o fevers or other infectious sxs. UA neg. COVID neg.   - c/w tx for possible aspiration PNA as below
Patient with mild leukocytosis. Possible etiology aspiration pna seen on imaging. Pt on room air - no cough at this time. Pt w/o fevers or other infectious sxs. UA neg. COVID neg.   -c/w tx for possible aspiration PNA as below

## 2023-04-19 NOTE — CONSULT NOTE ADULT - CONSULT REQUESTED DATE/TIME
15-Apr-2023 18:09
16-Apr-2023 18:21
14-Apr-2023 06:10
14-Apr-2023 17:38
19-Apr-2023
13-Apr-2023 22:18
14-Apr-2023 10:59
17-Apr-2023 11:42
14-Apr-2023 11:58

## 2023-04-19 NOTE — CONSULT NOTE ADULT - PROVIDER SPECIALTY LIST ADULT
Neurosurgery
Rheumatology
Neurology
Urology
Endocrinology
Gastroenterology
Heme/Onc
Surgery
Rehab Medicine

## 2023-04-19 NOTE — PROGRESS NOTE ADULT - SUBJECTIVE AND OBJECTIVE BOX
CC: Patient is a 68y old  Male who presents with a chief complaint of ftt (19 Apr 2023 13:39)    INTERVAL EVENTS: Rectal temp showing hypothermia. Given bear hugger with increase in temp.    SUBJECTIVE / INTERVAL HPI: Patient seen and examined at bedside. Feeling well this AM. Has a better appetite and would like to eat regular food instead of liquid.     ROS: negative unless otherwise stated above.    VITAL SIGNS:  Vital Signs Last 24 Hrs  T(C): 37.6 (19 Apr 2023 12:49), Max: 37.6 (19 Apr 2023 12:49)  T(F): 99.6 (19 Apr 2023 12:49), Max: 99.6 (19 Apr 2023 12:49)  HR: 84 (19 Apr 2023 12:49) (84 - 110)  BP: 115/68 (19 Apr 2023 11:50) (110/61 - 117/79)  RR: 17 (19 Apr 2023 11:50) (17 - 18)  SpO2: 95% (19 Apr 2023 11:50) (92% - 98%)    Parameters below as of 19 Apr 2023 11:50  Patient On (Oxygen Delivery Method): room air      PHYSICAL EXAM:  Constitutional: pleasant male resting comfortably; NAD  HEENT: NC/AT, PERRL, EOMI, anicteric sclera, dry MM  Neck: supple; no JVD or thyromegaly, no palpable lymph nodes  Respiratory: CTA B/L; no W/R/R, no retractions  Cardiac: +S1/S2; RRR; no M/R/G  Gastrointestinal: soft, NT/ND; no rebound or guarding; +BS  Extremities: WWP, no peripheral edema  Musculoskeletal: swollen fingers and joints in hands b/l possible sclerodactyly   Vascular: 2+ radial, DP/PT pulses B/L  Dermatologic: skin warm, dry, thickened  Neurologic: AAOx3, b/l  strength and UE extension 2/5, flexion 3/5 in b/l UE, unable to lift arms above head, 4/5 in b/l LE  Psychiatric: calm, cooperative, behaviors are appropriate    MEDICATIONS:  MEDICATIONS  (STANDING):  cefTRIAXone   IVPB 2000 milliGRAM(s) IV Intermittent every 24 hours  enoxaparin Injectable 40 milliGRAM(s) SubCutaneous every 24 hours  levothyroxine 50 MICROGram(s) Oral daily  Nephro-james 1 Tablet(s) Oral daily  nystatin    Suspension [id number] Unit(s) Swish and Swallow every 6 hours  pantoprazole    Tablet 40 milliGRAM(s) Oral two times a day  thiamine IVPB 500 milliGRAM(s) IV Intermittent every 8 hours    MEDICATIONS  (PRN):      ALLERGIES:  Allergies    No Known Allergies    Intolerances        LABS:                        10.2   7.75  )-----------( 406      ( 18 Apr 2023 05:30 )             29.8     04-18    135  |  100  |  15  ----------------------------<  81  3.6   |  25  |  1.50<H>    Ca    8.7      18 Apr 2023 05:30  Phos  3.4     04-18  Mg     2.0     04-18    TPro  7.3  /  Alb  2.2<L>  /  TBili  0.3  /  DBili  x   /  AST  174<H>  /  ALT  88<H>  /  AlkPhos  47  04-18        CAPILLARY BLOOD GLUCOSE      POCT Blood Glucose.: 128 mg/dL (19 Apr 2023 12:38)      RADIOLOGY & ADDITIONAL TESTS: Reviewed.

## 2023-04-19 NOTE — PROGRESS NOTE ADULT - ASSESSMENT
69yo male with no sig PMH PSH history presents with worsening weight loss, dysphagia, weakness and fatigue. Afebrile, HDS, exam significant for cachexia, abdomen soft, right testicular mass. Labs with WBC 12k, elevated TSH. CT demonstrating fluid level in esophagus, right testicular mass and concern for brain lesion. Differential includes metastatic neoplasm, unlikely esophageal, potentially testicular primary. Patient currently poorly nutritionally optimized.     -no acute surgical intervention  -Neurology consulted 4/19: cervical weakness plan for tentative EMG 4/20  -s/p EGD 4/17 w/dilation and biopsy: no Kulkarni's esophagus, plan for repeat dilation per GI  -GI recommendations appreciated   -S&S/MBSS results appreciated  -Urology - plan for outpatient orchiectomy/followup  -Care per primary team  -Surgery Team 2C will continue to follow. Please page Team 2 with questions/clinical changes. [phone]

## 2023-04-19 NOTE — PROGRESS NOTE ADULT - PROBLEM SELECTOR PROBLEM 9
Lower extremity edema
Hypothyroid
Lower extremity edema
Hypothyroid

## 2023-04-19 NOTE — PROGRESS NOTE ADULT - SUBJECTIVE AND OBJECTIVE BOX
Interval Events: EGD w/dilation and biopsies 4/17       SUBJECTIVE: Patient seen and examined bedside. Patient doing well advanced to soft diet. Tolerating without episodes of emesis. No acute complaints.    cefTRIAXone   IVPB 2000 milliGRAM(s) IV Intermittent every 24 hours  enoxaparin Injectable 40 milliGRAM(s) SubCutaneous every 24 hours  nystatin    Suspension [id number] Unit(s) Swish and Swallow every 6 hours      Vital Signs Last 24 Hrs  T(C): 37.6 (19 Apr 2023 12:49), Max: 37.6 (19 Apr 2023 12:49)  T(F): 99.6 (19 Apr 2023 12:49), Max: 99.6 (19 Apr 2023 12:49)  HR: 84 (19 Apr 2023 12:49) (84 - 110)  BP: 115/68 (19 Apr 2023 11:50) (110/61 - 117/79)  BP(mean): --  RR: 17 (19 Apr 2023 11:50) (17 - 18)  SpO2: 95% (19 Apr 2023 11:50) (92% - 98%)    Parameters below as of 19 Apr 2023 11:50  Patient On (Oxygen Delivery Method): room air      I&O's Detail      General: NAD, resting comfortably in bed  C/V: NSR  Pulm: Nonlabored breathing, no respiratory distress  Abd: soft, NT/ND.  Extrem: WWP, no edema, SCDs in place        LABS:                        10.2   7.75  )-----------( 406      ( 18 Apr 2023 05:30 )             29.8     04-18    135  |  100  |  15  ----------------------------<  81  3.6   |  25  |  1.50<H>    Ca    8.7      18 Apr 2023 05:30  Phos  3.4     04-18  Mg     2.0     04-18    TPro  7.3  /  Alb  2.2<L>  /  TBili  0.3  /  DBili  x   /  AST  174<H>  /  ALT  88<H>  /  AlkPhos  47  04-18          RADIOLOGY & ADDITIONAL STUDIES:

## 2023-04-19 NOTE — PROGRESS NOTE ADULT - PROBLEM SELECTOR PROBLEM 10
Prophylactic measure
Lower extremity edema
Prophylactic measure
Lower extremity edema
Prophylactic measure
Prophylactic measure

## 2023-04-19 NOTE — CONSULT NOTE ADULT - CONSULT REQUESTED BY NAME
Dr. Wiggins
ED
Nelida Terrazas
7 uris
Dr. Terrazas
Medicine
Dr. Terrazas
Internal Medicine
house staff

## 2023-04-19 NOTE — PROGRESS NOTE ADULT - PROBLEM SELECTOR PLAN 6
Patient presents with an areli considering no hx of ckd. Likely hypovolemic as pt with poor PO intake.  - c/w 500cc maintenance fluids  - f/u urine lytes  - trend Cr  - avoid nephrotoxic medications
Patient presents with an areli considering no hx of ckd. Likely hypovolemic as pt with poor PO intake.  - c/w 500cc maintenance fluids  - f/u urine lytes  - trend Cr  - avoid nephrotoxic medications
Patient presents with an OLGA considering given no hx of CKD. Likely hypovolemic as pt with poor PO intake. Given protein gap (low albumin and normal total protein) and elevated Kappa/Lambda ratio, ddx includes gammopathy especially with isolated elevation in IgG (normal IgM and IgA). Rule out multiple myeloma given these findings and renal dysfunction.   - Obtain urine/serum protein electrophoresis  - Trend Cr  - Avoid nephrotoxic medications
Patient presents with an areli considering no hx of ckd. Likely hypovolemic as pt with poor PO intake.  - c/w 500cc maintenance fluids  - f/u urine lytes  - trend Cr  - avoid nephrotoxic medications
Patient presents with an areli considering no hx of ckd. Likely hypovolemic as pt with poor PO intake.  - c/w 500cc maintenance fluids  - f/u urine lytes  - trend Cr  - avoid nephrotoxic medications
Patient presents with an OLGA considering given no hx of CKD. Likely hypovolemic as pt with poor PO intake. Given protein gap (low albumin and normal total protein) and elevated Kappa/Lambda ratio, ddx includes gammopathy especially with isolated elevation in IgG (normal IgM and IgA). Rule out multiple myeloma given these findings and renal dysfunction.   - Obtain urine/serum protein electrophoresis  - Trend Cr  - Avoid nephrotoxic medications

## 2023-04-20 LAB
HEMOGLOBIN INTERPRETATION: SIGNIFICANT CHANGE UP
HGB A MFR BLD: 65.4 % — LOW (ref 95.8–98)
HGB A2 MFR BLD: 3.3 % — HIGH (ref 2–3.2)
HGB C MFR BLD: 31.3 % — HIGH
PROT ?TM UR-MCNC: 101 MG/DL — HIGH (ref 0–12)
PROT ?TM UR-MCNC: 101 MG/DL — HIGH (ref 0–12)

## 2023-04-21 ENCOUNTER — NON-APPOINTMENT (OUTPATIENT)
Age: 68
End: 2023-04-21

## 2023-04-21 ENCOUNTER — APPOINTMENT (OUTPATIENT)
Age: 68
End: 2023-04-21
Payer: MEDICARE

## 2023-04-21 VITALS
TEMPERATURE: 97.8 F | HEART RATE: 104 BPM | SYSTOLIC BLOOD PRESSURE: 115 MMHG | WEIGHT: 150 LBS | DIASTOLIC BLOOD PRESSURE: 79 MMHG | BODY MASS INDEX: 19.25 KG/M2 | HEIGHT: 74 IN

## 2023-04-21 DIAGNOSIS — G62.9 POLYNEUROPATHY, UNSPECIFIED: ICD-10-CM

## 2023-04-21 LAB
G6PD RBC-CCNC: 22.8 U/G HGB — HIGH (ref 7–20.5)
G6PD RBC-CCNC: 26.1 U/G HGB — HIGH (ref 7–20.5)

## 2023-04-21 PROCEDURE — 99215 OFFICE O/P EST HI 40 MIN: CPT | Mod: 25

## 2023-04-21 PROCEDURE — 95886 MUSC TEST DONE W/N TEST COMP: CPT

## 2023-04-21 PROCEDURE — 95911 NRV CNDJ TEST 9-10 STUDIES: CPT

## 2023-04-21 PROCEDURE — 95937 NEUROMUSCULAR JUNCTION TEST: CPT

## 2023-04-21 RX ORDER — PREDNISONE 20 MG/1
20 TABLET ORAL
Qty: 105 | Refills: 0 | Status: ACTIVE | COMMUNITY
Start: 2023-04-21 | End: 1900-01-01

## 2023-04-21 NOTE — DATA REVIEWED
[de-identified] : TERE 1:1280 speckled, hep C neg, anti-RNP 3.6, , ALT 91, Cr 1.59, immunofixatoin positive M spike, CK 2281

## 2023-04-21 NOTE — CONSULT LETTER
[Dear  ___] : Dear  [unfilled], [Consult Letter:] : I had the pleasure of evaluating your patient, [unfilled]. [Please see my note below.] : Please see my note below. [Consult Closing:] : Thank you very much for allowing me to participate in the care of this patient.  If you have any questions, please do not hesitate to contact me. [Sincerely,] : Sincerely, [FreeTextEntry3] : Dixon Reyes MD

## 2023-04-21 NOTE — PHYSICAL EXAM
[FreeTextEntry1] : General: this is a pleasant patient in no acute distress\par \par HEENT conjunctiva are normal, no tenderness in head\par \par CV: normal pulses, regular rate and rhythm, no peripheral edema noted\par \par Lungs: breathing is non-labored\par \par abd: soft and non-distended\par \par \par \par Mental status:\par Alert and oriented to person, place and time, normal speech and comprehension\par \par Cranial Nerves:\par extra-occular movements in tact without nystagmus, normal saccades and smooth pursuit, Face symmetric and facial strength symmetric, facial sensation symmetric, nasal voice. neck flexion 3/5 extension 3/5\par \par Motor: normal tone throughout. no abnormal movements.  severe generalized weakness worse proximal than distal 4/5 distal, 2/5 proximal.  atrophy in b/l deltoid\par \par Sensory: in tact and symmetric to vibration, light tough, temperature except decreased vib in feet\par \par Cerebellar: normal finger-nose-finger bilaterally\par \par Reflexes: 1+ in the upper and lower extremities and symmetric.  toes are bilaterally downgoing.\par \par Gait: needs assistance walking and getting out of chair\par \par \par \par \par

## 2023-04-21 NOTE — ASSESSMENT
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

## 2023-04-21 NOTE — HISTORY OF PRESENT ILLNESS
[FreeTextEntry1] : TRE BRENNAN is a 68 year who is referred for clinical neurophysiological consultation and EMG/NCS for weakness\par \par discharged from hospital wed\par \par He has generalized weakness, body pain, and unable to swallow x 6 months with a gradual 50lb weight loss. All of his issues started 1 year ago per pt he started to have lower extremity swelling, was started on lasix and subsequently lost to f/u so stoppe dhis lasix and then started to have facial swelling 4 months ago. He said he had a sonogram of his heart and was told something was abnormal but does not remember what.  After that he had difficulty swallowing solid foods which has progressed to sometimes thin foods as well. He saw his gastroenterologist Dr Meier who told him he was having aspiration but he never had imaging or work up. He took antibiotics for 'aspiration' but said that they did not help because he never had much of a cough or fever.  He was sent to Dr. Quiñones today but at appt recommended to come to the ED for inpatient workup. He has been having falls about 3 for the past few months. States had spine imaging that he did not bring of his thoracic spine that was totally normal, weakness attributed to dehydation/weight loss not spinal. Pt denies any fever, chills, n/v, diarrhea/ constipation.  \par \par Since discharge he has been eating pureed foods without aspirating\par \par \par \par

## 2023-04-24 DIAGNOSIS — R62.7 ADULT FAILURE TO THRIVE: ICD-10-CM

## 2023-04-24 DIAGNOSIS — M34.9 SYSTEMIC SCLEROSIS, UNSPECIFIED: ICD-10-CM

## 2023-04-24 DIAGNOSIS — J69.0 PNEUMONITIS DUE TO INHALATION OF FOOD AND VOMIT: ICD-10-CM

## 2023-04-24 DIAGNOSIS — E03.9 HYPOTHYROIDISM, UNSPECIFIED: ICD-10-CM

## 2023-04-24 DIAGNOSIS — M43.00 SPONDYLOLYSIS, SITE UNSPECIFIED: ICD-10-CM

## 2023-04-24 DIAGNOSIS — G12.21 AMYOTROPHIC LATERAL SCLEROSIS: ICD-10-CM

## 2023-04-24 DIAGNOSIS — R13.12 DYSPHAGIA, OROPHARYNGEAL PHASE: ICD-10-CM

## 2023-04-24 DIAGNOSIS — R52 PAIN, UNSPECIFIED: ICD-10-CM

## 2023-04-24 DIAGNOSIS — N50.89 OTHER SPECIFIED DISORDERS OF THE MALE GENITAL ORGANS: ICD-10-CM

## 2023-04-24 DIAGNOSIS — R74.01 ELEVATION OF LEVELS OF LIVER TRANSAMINASE LEVELS: ICD-10-CM

## 2023-04-24 DIAGNOSIS — K22.70 BARRETT'S ESOPHAGUS WITHOUT DYSPLASIA: ICD-10-CM

## 2023-04-24 DIAGNOSIS — G93.9 DISORDER OF BRAIN, UNSPECIFIED: ICD-10-CM

## 2023-04-24 DIAGNOSIS — R64 CACHEXIA: ICD-10-CM

## 2023-04-24 DIAGNOSIS — K44.9 DIAPHRAGMATIC HERNIA WITHOUT OBSTRUCTION OR GANGRENE: ICD-10-CM

## 2023-04-24 DIAGNOSIS — N50.9 DISORDER OF MALE GENITAL ORGANS, UNSPECIFIED: ICD-10-CM

## 2023-04-24 DIAGNOSIS — N17.9 ACUTE KIDNEY FAILURE, UNSPECIFIED: ICD-10-CM

## 2023-04-24 DIAGNOSIS — B37.9 CANDIDIASIS, UNSPECIFIED: ICD-10-CM

## 2023-04-24 DIAGNOSIS — E43 UNSPECIFIED SEVERE PROTEIN-CALORIE MALNUTRITION: ICD-10-CM

## 2023-04-24 DIAGNOSIS — D50.9 IRON DEFICIENCY ANEMIA, UNSPECIFIED: ICD-10-CM

## 2023-04-24 DIAGNOSIS — C85.90 NON-HODGKIN LYMPHOMA, UNSPECIFIED, UNSPECIFIED SITE: ICD-10-CM

## 2023-04-25 LAB
% GAMMA, URINE: 25.4 % — SIGNIFICANT CHANGE UP
ALBUMIN 24H MFR UR ELPH: 13.7 % — SIGNIFICANT CHANGE UP
ALPHA1 GLOB 24H MFR UR ELPH: 24.9 % — SIGNIFICANT CHANGE UP
ALPHA2 GLOB 24H MFR UR ELPH: 13.4 % — SIGNIFICANT CHANGE UP
B-GLOBULIN 24H MFR UR ELPH: 22.6 % — SIGNIFICANT CHANGE UP
INTERPRETATION 24H UR IFE-IMP: SIGNIFICANT CHANGE UP
INTERPRETATION 24H UR IFE-IMP: SIGNIFICANT CHANGE UP
M PROTEIN 24H UR ELPH-MRATE: SIGNIFICANT CHANGE UP
PROT PATTERN 24H UR ELPH-IMP: SIGNIFICANT CHANGE UP

## 2023-04-26 ENCOUNTER — APPOINTMENT (OUTPATIENT)
Dept: RHEUMATOLOGY | Facility: CLINIC | Age: 68
End: 2023-04-26
Payer: MEDICARE

## 2023-04-26 ENCOUNTER — LABORATORY RESULT (OUTPATIENT)
Age: 68
End: 2023-04-26

## 2023-04-26 VITALS
SYSTOLIC BLOOD PRESSURE: 135 MMHG | DIASTOLIC BLOOD PRESSURE: 84 MMHG | HEIGHT: 74 IN | BODY MASS INDEX: 19.76 KG/M2 | HEART RATE: 72 BPM | TEMPERATURE: 98.4 F | OXYGEN SATURATION: 100 % | WEIGHT: 154 LBS

## 2023-04-26 DIAGNOSIS — R74.8 ABNORMAL LEVELS OF OTHER SERUM ENZYMES: ICD-10-CM

## 2023-04-26 DIAGNOSIS — R13.10 DYSPHAGIA, UNSPECIFIED: ICD-10-CM

## 2023-04-26 DIAGNOSIS — R76.8 OTHER SPECIFIED ABNORMAL IMMUNOLOGICAL FINDINGS IN SERUM: ICD-10-CM

## 2023-04-26 DIAGNOSIS — Z78.9 OTHER SPECIFIED HEALTH STATUS: ICD-10-CM

## 2023-04-26 DIAGNOSIS — Z87.891 PERSONAL HISTORY OF NICOTINE DEPENDENCE: ICD-10-CM

## 2023-04-26 DIAGNOSIS — G72.49 OTHER INFLAMMATORY AND IMMUNE MYOPATHIES, NOT ELSEWHERE CLASSIFIED: ICD-10-CM

## 2023-04-26 DIAGNOSIS — G72.9 MYOPATHY, UNSPECIFIED: ICD-10-CM

## 2023-04-26 DIAGNOSIS — M62.81 MUSCLE WEAKNESS (GENERALIZED): ICD-10-CM

## 2023-04-26 DIAGNOSIS — Z00.00 ENCOUNTER FOR GENERAL ADULT MEDICAL EXAMINATION W/OUT ABNORMAL FINDINGS: ICD-10-CM

## 2023-04-26 PROCEDURE — 99205 OFFICE O/P NEW HI 60 MIN: CPT | Mod: 25

## 2023-04-26 PROCEDURE — 36415 COLL VENOUS BLD VENIPUNCTURE: CPT

## 2023-04-27 ENCOUNTER — APPOINTMENT (OUTPATIENT)
Dept: BARIATRICS | Facility: CLINIC | Age: 68
End: 2023-04-27
Payer: MEDICARE

## 2023-04-27 VITALS
WEIGHT: 150.25 LBS | SYSTOLIC BLOOD PRESSURE: 143 MMHG | DIASTOLIC BLOOD PRESSURE: 78 MMHG | BODY MASS INDEX: 19.28 KG/M2 | OXYGEN SATURATION: 89 % | HEIGHT: 74 IN | HEART RATE: 96 BPM | TEMPERATURE: 97.9 F

## 2023-04-27 LAB
ALBUMIN SERPL ELPH-MCNC: 2.8 G/DL
ALP BLD-CCNC: 64 U/L
ALT SERPL-CCNC: 119 U/L
ANION GAP SERPL CALC-SCNC: 11 MMOL/L
AST SERPL-CCNC: 215 U/L
BASOPHILS # BLD AUTO: 0 K/UL
BASOPHILS NFR BLD AUTO: 0 %
BILIRUB SERPL-MCNC: 0.5 MG/DL
BUN SERPL-MCNC: 37 MG/DL
CALCIUM SERPL-MCNC: 9.9 MG/DL
CHLORIDE SERPL-SCNC: 95 MMOL/L
CK SERPL-CCNC: 2331 U/L
CO2 SERPL-SCNC: 27 MMOL/L
CREAT SERPL-MCNC: 1.25 MG/DL
EGFR: 63 ML/MIN/1.73M2
EOSINOPHIL # BLD AUTO: 0 K/UL
EOSINOPHIL NFR BLD AUTO: 0 %
GLUCOSE SERPL-MCNC: 98 MG/DL
HCT VFR BLD CALC: 36.3 %
HGB BLD-MCNC: 11.9 G/DL
IGA SER QL IEP: 410 MG/DL
LYMPHOCYTES # BLD AUTO: 0.68 K/UL
LYMPHOCYTES NFR BLD AUTO: 5.3 %
MAN DIFF?: NORMAL
MCHC RBC-ENTMCNC: 23.4 PG
MCHC RBC-ENTMCNC: 32.8 GM/DL
MCV RBC AUTO: 71.5 FL
MONOCYTES # BLD AUTO: 0.68 K/UL
MONOCYTES NFR BLD AUTO: 5.3 %
NEUTROPHILS # BLD AUTO: 11.29 K/UL
NEUTROPHILS NFR BLD AUTO: 87.7 %
PLATELET # BLD AUTO: 444 K/UL
POTASSIUM SERPL-SCNC: 5.3 MMOL/L
PROT SERPL-MCNC: 8.7 G/DL
RBC # BLD: 5.08 M/UL
RBC # FLD: 23.2 %
SODIUM SERPL-SCNC: 133 MMOL/L
WBC # FLD AUTO: 12.87 K/UL

## 2023-04-27 PROCEDURE — 99213 OFFICE O/P EST LOW 20 MIN: CPT

## 2023-04-27 NOTE — DATA REVIEWED
[FreeTextEntry1] : ACC: [id number] EXAM: MR SPINE CERVICAL WAW IC ORDERED BY: MIHIR FLYNN\par \par PROCEDURE DATE: 04/15/2023\par \par \par \par INTERPRETATION: PROCEDURE: MRI cervical spine with and without contrast\par \par INDICATION: Generalized weakness, weight loss. Concern for mass.\par \par TECHNIQUE: Multiplanar multisequence MR imaging of the cervical spine obtained per standard protocol. Images acquired both before and after IV administration of 7 cc Gadavist.\par \par COMPARISON: Neck CT 04/13/2023\par \par FINDINGS:\par \par There is preserved cervical lordosis without listhesis or other malalignment. Craniovertebral junction is intact.\par \par No prevertebral collection or following swelling. Diffuse anasarca is present.\par \par Marrow signal of the cervical spine unremarkable without abnormal enhancement or edema signal spanning minimal endplate changes at C6-7. There is a ovoid and well-circumscribed enhancing focus in the left T1 pedicle which is T2 hyperintense on series 9, image 32, isointense on T1 and with preserved cortex on CT. This is most consistent with incidental venous malformation. No extraosseous extent.\par \par Cervical spinal cord is unremarkable in size, contour and signal intensity. No pathologic enhancement in the spinal cord or intradural canal allowing for motion limited view. No fluid collection.\par \par There is no navicula spinal stenosis. Ventral thecal sac is effaced at a few levels from posterior osteophyte ridging. There is multilevel facet arthrosis and uncinate spurring with neural foraminal narrowing is visible on the left at C3-6 levels.\par \par \par IMPRESSION:\par \par No cervical spinal cord compression. No mass.\par \par Multilevel spondylosis with left asymmetric neural foraminal narrowings. No significant spinal stenosis.\par \par Anasarca.\par \par --- End of Report ---\par \par ACC: [id number] EXAM: MR SPINE CERVICAL WAW IC ORDERED BY: MIHIR FLYNN\par \par PROCEDURE DATE: 04/15/2023\par \par \par \par INTERPRETATION: PROCEDURE: MRI cervical spine with and without contrast\par \par INDICATION: Generalized weakness, weight loss. Concern for mass.\par \par TECHNIQUE: Multiplanar multisequence MR imaging of the cervical spine obtained per standard protocol. Images acquired both before and after IV administration of 7 cc Gadavist.\par \par COMPARISON: Neck CT 04/13/2023\par \par FINDINGS:\par \par There is preserved cervical lordosis without listhesis or other malalignment. Craniovertebral junction is intact.\par \par No prevertebral collection or following swelling. Diffuse anasarca is present.\par \par Marrow signal of the cervical spine unremarkable without abnormal enhancement or edema signal spanning minimal endplate changes at C6-7. There is a ovoid and well-circumscribed enhancing focus in the left T1 pedicle which is T2 hyperintense on series 9, image 32, isointense on T1 and with preserved cortex on CT. This is most consistent with incidental venous malformation. No extraosseous extent.\par \par Cervical spinal cord is unremarkable in size, contour and signal intensity. No pathologic enhancement in the spinal cord or intradural canal allowing for motion limited view. No fluid collection.\par \par There is no navicula spinal stenosis. Ventral thecal sac is effaced at a few levels from posterior osteophyte ridging. There is multilevel facet arthrosis and uncinate spurring with neural foraminal narrowing is visible on the left at C3-6 levels.\par \par \par IMPRESSION:\par \par No cervical spinal cord compression. No mass.\par \par Multilevel spondylosis with left asymmetric neural foraminal narrowings. No significant spinal stenosis.\par \par Anasarca.\par \par --- End of Report ---\par \par ACC: [id number] EXAM: XR SWAL FUNC ROSALBA VID CON STDY ORDERED BY: SHREE MAGALLON\par \par PROCEDURE DATE: 04/14/2023\par \par \par \par INTERPRETATION: FLUOROSCOPIC SWALLOW STUDY\par \par INDICATION: Dysphagia; failure to thrive\par \par COMPARISON: CT neck soft tissue from 4/13/2023.\par \par FLUORO TIME: 3.7 minutes.\par \par FINDINGS:\par \par Thin Liquid:\par Penetration: Positive.\par Aspiration: Positive.\par Eliminated with posture/technique: Not applicable.\par \par Nectar Thick Liquid:\par Penetration: Positive.\par Aspiration: Positive.\par Eliminated with posture/technique: Not applicable.\par \par Puree:\par Penetration: Negative.\par Aspiration: Negative.\par Eliminated with posture/technique: Not applicable.\par \par Regular consistency:\par Penetration: Negative.\par Aspiration: Negative.\par Eliminated with posture/technique: Not applicable.\par \par Anatomical findings: Moderate degenerative changes of the cervical spine. Prominent cricopharyngeal bar.\par \par \par IMPRESSION: As above. Please see speech pathology report in the patient's chart for complete details regarding swallow function.\par \par CONCLUSIONS:\par \par  1. Normal left ventricular size and systolic function.\par  2. Normal right ventricular size and systolic function.\par  3. Normal atria.\par  4. No significant valvular disease.\par  5. Pulmonary artery systolic pressure is 34 mmHg.\par  6. Moderate pericardial effusion without echocardiographic evidence of cardiac tamponade physiology.\par  7. Right pleural effusion.\par  8. No prior echo is available for comparison.\par \par ACC: [id number] EXAM: CT ABDOMEN AND PELVIS IC ORDERED BY: NELIA GRAY\par \par ACC: [id number] EXAM: CT CHEST IC ORDERED BY: NELIA GRAY\par \par PROCEDURE DATE: 04/13/2023\par \par \par \par INTERPRETATION: CLINICAL INFORMATION: 50 pound weight loss. Possible aspiration. Chest/neck pain.\par \par COMPARISON: None.\par \par CONTRAST/COMPLICATIONS:\par IV Contrast: 60 cc Isovue-370 .\par Oral Contrast: NONE\par Complications: None reported at time of study completion\par \par PROCEDURE:\par CT of the Chest, Abdomen and Pelvis was performed.\par Sagittal and coronal reformats were performed.\par \par FINDINGS:\par CHEST:\par LUNGS AND LARGE AIRWAYS: Bilateral multifocal patchy consolidative and groundglass opacities, and dependent distribution and predominantly bilateral lower lobes. Patent central airways.\par PLEURA: No pleural effusion.\par VESSELS: Mild atherosclerotic changes..\par HEART: Cardiomegaly. No pericardial effusion.\par MEDIASTINUM AND KEON: No lymphadenopathy.\par CHEST WALL AND LOWER NECK: Bilateral symmetric gynecomastia.\par \par ABDOMEN AND PELVIS:\par LIVER: Within normal limits.\par BILE DUCTS: Normal caliber.\par GALLBLADDER: Within normal limits.\par SPLEEN: Within normal limits.\par PANCREAS: Within normal limits.\par ADRENALS: Within normal limits.\par KIDNEYS/URETERS: No renal stones or hydronephrosis.\par \par BLADDER: There is circumferential wall thickening of the urinary bladder.\par REPRODUCTIVE ORGANS: Partially imaged scrotum/testicles, hyperattenuating right hemiscrotal nodule 1.7 x 1.4 cm, may represent normal testicle enhancement or testicular mass, cannot be accurately assessed since contralateral left hemiscrotum is not included in field-of-view. Given concern for malignancy recommend scrotal ultrasound to exclude testicular mass.\par \par \par BOWEL: Mild distention of the proximal and mid esophagus with layering fluid. Limited evaluation secondary to lack of enteric contrast. No bowel obstruction.\par PERITONEUM: No ascites.\par VESSELS: Mild atherosclerotic changes.\par RETROPERITONEUM/LYMPH NODES: No lymphadenopathy.\par ABDOMINAL WALL: Diffuse subcutaneous edema.\par BONES: Degenerative changes.\par \par IMPRESSION:\par 1. Mild fluid distended lower esophagus with multifocal patchy consolidative and groundglass opacities bilaterally with dependent and basilar distribution, most likely aspiration pneumonia.\par 2. Partially imaged scrotum/testicles, hyperenhancing right hemiscrotal nodule may represent normal testicle or testicular mass, cannot be accurately assessed since contralateral left hemiscrotum is not included in field-of-view. Given history of unintentional weight loss recommend scrotal ultrasound for further evaluation to exclude mass.\par 3. Anasarca.\par \par --- End of Report ---\par \par

## 2023-04-27 NOTE — REVIEW OF SYSTEMS
[Feeling Tired] : feeling tired [Recent Weight Gain (___ Lbs)] : recent [unfilled] ~Ulb weight gain [Cough] : cough [Diarrhea] : diarrhea [Arthralgias] : arthralgias [Skin Lesions] : skin lesion [Dry Skin] : dry skin [Limb Weakness] : limb weakness [Difficulty Walking] : difficulty walking [Negative] : Heme/Lymph [FreeTextEntry5] : has heart monitor place Monday [FreeTextEntry9] : muscle weakness of the upper and lower extremities

## 2023-04-27 NOTE — ASSESSMENT
[FreeTextEntry1] : 68-year-old man with history of muscle weakness, noted to have TERE positive, RNP positive, elevated CPK, EMG with aggressive myopathy, skin changes suggestive of overlap syndrome, possible mixed connective tissue disease versus dermatomyositis.  Patient was started on prednisone 70 mg daily with PPI, no side effects noted to date. Will repeat labs today in office, confirmed with lab, myomarker panel pending from 4/17/2023\par Discussed IVIG with patient and family, will start, orders completed, will check AVISE today\par Repeat CPK, IgA level.  Patient has follow-up with surgeon this week, continuing evaluation for possible underlying malignancy as possible paraneoplastic etiology as well. Physical therapy for range of motion exercises, particularly bilateral upper extremities. Follow-up in 2 to 3 weeks to reevaluate.\par

## 2023-04-27 NOTE — HISTORY OF PRESENT ILLNESS
[FreeTextEntry1] : April 26, 2023\par 68 year old man referred for rheumatology evaluation\par \par Patient reports overall symptoms about one year ago associated with peripheral edema, following by muscle weakness, but has become progressively worse over the past couple of months\par Patient with recent admission to [facility], noted to have elevated CPK\par Evaluated by neurologist with EMG completed, consistent with myositis\par Started on prednisone 70 mg qday last Friday, no side effects noted, no real improvement in symptoms\par \par Started feeling weak about four months ago,\par Started in 9/2022\par In February 2023, was having difficulty to feel swallowing\par Feels swallowing improving\par Currently wearing a neck brace to help with muscle weakness of the cervical\par Patient with very limited range of motion of the upper extremities, patient reports has not been able to reach over head for more than 6 months\par Ambulates with walker with assistance\par Had endoscopy in the hospital, as well as swallowing study to evaluate for aspiration\par \par Noted to have strongly positive TERE and low positive RNP 3.4\par Myomarker panel sent in hospital, pending from April 17\par No family history of RA or SLE\par +weight loss of 10 pounds in past two months\par No statin use\par \par Colonoscopy 11/2022 normal\par No history of tuberculosis or previous positive test for Tb\par Skin changes of the upper chest and facial rashes noted\par no photosensitivity\par +Raynauds symptoms\par \par EMG last week: EMG/NCS of right arm and leg 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

## 2023-04-27 NOTE — PHYSICAL EXAM
[General Appearance - Alert] : alert [Sclera] : the sclera and conjunctiva were normal [Examination Of The Oral Cavity] : the lips and gums were normal [] : no respiratory distress [Respiration, Rhythm And Depth] : normal respiratory rhythm and effort [Exaggerated Use Of Accessory Muscles For Inspiration] : no accessory muscle use [No Spinal Tenderness] : no spinal tenderness [Oriented To Time, Place, And Person] : oriented to person, place, and time [Impaired Insight] : insight and judgment were intact [Affect] : the affect was normal [FreeTextEntry1] : Hyperpigmentation and tightening of the upper chest wall.  Hyperpigmentation of the face across the forehead as well as cheeks.  Periungual hyperemia of the fingers bilateral hands.  Cracking of the skin of the distal fingers, no Gottron's papules noted

## 2023-04-28 ENCOUNTER — TRANSCRIPTION ENCOUNTER (OUTPATIENT)
Age: 68
End: 2023-04-28

## 2023-04-29 LAB
M TB IFN-G BLD-IMP: ABNORMAL
QUANTIFERON TB PLUS MITOGEN MINUS NIL: 0.13 IU/ML
QUANTIFERON TB PLUS NIL: 0.02 IU/ML
QUANTIFERON TB PLUS TB1 MINUS NIL: 0 IU/ML
QUANTIFERON TB PLUS TB2 MINUS NIL: 0 IU/ML

## 2023-05-05 ENCOUNTER — APPOINTMENT (OUTPATIENT)
Dept: UROLOGY | Facility: CLINIC | Age: 68
End: 2023-05-05
Payer: MEDICARE

## 2023-05-05 VITALS
OXYGEN SATURATION: 94 % | DIASTOLIC BLOOD PRESSURE: 76 MMHG | TEMPERATURE: 97.1 F | SYSTOLIC BLOOD PRESSURE: 131 MMHG | HEART RATE: 85 BPM

## 2023-05-05 PROCEDURE — 99213 OFFICE O/P EST LOW 20 MIN: CPT

## 2023-05-05 NOTE — ADDENDUM
[FreeTextEntry1] : Documented by Zane Henley acting as a scribe for SHAINA Flores on 04/27/2023\par

## 2023-05-05 NOTE — PHYSICAL EXAM
[No Rash or Lesion] : No rash or lesion [Alert] : alert [Oriented to Person] : oriented to person [Oriented to Place] : oriented to place [Oriented to Time] : oriented to time [Calm] : calm [de-identified] : not in any acute distress [de-identified] : uses cervical collar  [de-identified] : ambulates with a walker

## 2023-05-05 NOTE — END OF VISIT
[Time Spent: ___ minutes] : I have spent [unfilled] minutes of time on the encounter. [FreeTextEntry3] : All medical record entries made by the Scribe were at my, SHAINA Flores , direction and personally dictated by me on 04/27/2023 . I have reviewed the chart and agree that the record accurately reflects my personal performance of the history, physical exam, assessment and plan. I have also personally directed, reviewed, and agreed with the chart.\par

## 2023-05-07 NOTE — REVIEW OF SYSTEMS
[see HPI] : see HPI [Negative] : Gastrointestinal [Fever] : no fever [Chills] : no chills [Eye Pain] : no eye pain [Red Eyes] : eyes not red [Nosebleeds] : no nosebleeds [Nasal Discharge] : no nasal discharge [Shortness Of Breath] : no shortness of breath [Wheezing] : no wheezing [Cough] : no cough [SOB on Exertion] : no shortness of breath during exertion [Confused] : no confusion [Convulsions] : no convulsions [Suicidal] : not suicidal [Sleep Disturbances] : no sleep disturbances [Anxiety] : no anxiety [Depression] : no depression

## 2023-05-07 NOTE — ASSESSMENT
[FreeTextEntry1] : 68M who was seen at 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 LDH was elevated at the time (657), HCG and AFP WNL (2 and 1.8 respectively). CT chest and CT head show no discrete masses or evidence of metastatic disease. He presents for outpatient follow up and discussion on possible next steps.\par \par Given likelihood of malignancy, discussed need to perform scrotal exploration with right radical orchiectomy\par Patient states he prefers to discuss with family prior to surgery\par RTC clinic 1 week for decision and surgical planning\par

## 2023-05-07 NOTE — HISTORY OF PRESENT ILLNESS
[None] : no symptoms [FreeTextEntry1] : Nick Mann is a 68M who was seen at [facility] ED for evaluation for the concern of generalized weakness, with anemia and hyperalbuminemia. Patient reports feeling better after the hospitalization. Several CT scans and imaging were performed during that time, with incidental finding of 1.4 cm right testicular mass concerning for malignancy on CTAP. LDH was elevated at the time (657), HCG and AFP WNL (2 and 1.8 respectively). CT chest and CT head show no discrete masses or evidence of metastatic disease. He presents for outpatient follow up and discussion on possible next steps.\par \par He denies fevers, chills, nausea, vomiting, or other urologic issues. He denies prior urologic follow up. He denies prior history, family or otherwise, of  malignancies.

## 2023-05-07 NOTE — PHYSICAL EXAM
[General Appearance - Well Developed] : well developed [General Appearance - Well Nourished] : well nourished [Normal Appearance] : normal appearance [Abdomen Tenderness] : non-tender [Abdomen Soft] : soft [Penis Abnormality] : normal uncircumcised penis [Skin Color & Pigmentation] : normal skin color and pigmentation [Skin Turgor] : supple [Heart Rate And Rhythm] : Heart rate and rhythm were normal [] : no respiratory distress [Respiration, Rhythm And Depth] : normal respiratory rhythm and effort [Exaggerated Use Of Accessory Muscles For Inspiration] : no accessory muscle use [Oriented To Time, Place, And Person] : oriented to person, place, and time [Not Anxious] : not anxious [FreeTextEntry1] : Requires walker for ambulation, generalized weakness

## 2023-05-09 LAB
ANTI PM-SCL-100 PLUS: <20 UNITS — SIGNIFICANT CHANGE UP
ANTI-SAE 1 IGG: <20 UNITS — SIGNIFICANT CHANGE UP
ANTI-SS-A 52 KD AB, IGG PLUS: 27 UNITS — HIGH
ANTI-U1-RNP AB PLUS: 34 UNITS — HIGH
EJ MYOMARKER3 PLUS: NEGATIVE — SIGNIFICANT CHANGE UP
ENA JO1 AB SER IA-ACNC: <20 UNITS — SIGNIFICANT CHANGE UP
FIBRILLARIN (U3 RNP) PLUS: NEGATIVE — SIGNIFICANT CHANGE UP
KU MYOMARKER3 PLUS: NEGATIVE — SIGNIFICANT CHANGE UP
MDA5 (P140)(CADM-140) PLUS: <20 UNITS — SIGNIFICANT CHANGE UP
MI-2 PLUS: NEGATIVE — SIGNIFICANT CHANGE UP
NXP-2 (P140) MYOPLUS: <20 UNITS — SIGNIFICANT CHANGE UP
OJ MYOMARKER3 PLUS: NEGATIVE — SIGNIFICANT CHANGE UP
PL-12 PLUS: NEGATIVE — SIGNIFICANT CHANGE UP
PL-7 PLUS: NEGATIVE — SIGNIFICANT CHANGE UP
SRP MYOMARKER3 PLUS: NEGATIVE — SIGNIFICANT CHANGE UP
TIF GAMMA (P155/140) PLUS: <20 UNITS — SIGNIFICANT CHANGE UP
U2 SNRNP PLUS: NEGATIVE — SIGNIFICANT CHANGE UP

## 2023-05-10 ENCOUNTER — NON-APPOINTMENT (OUTPATIENT)
Age: 68
End: 2023-05-10

## 2023-05-10 ENCOUNTER — APPOINTMENT (OUTPATIENT)
Dept: HEMATOLOGY ONCOLOGY | Facility: CLINIC | Age: 68
End: 2023-05-10

## 2023-05-11 LAB
ANTI-BETA2 GLYCOPROTEIN 1 IGG CONCENTRATION: 4 U/ML
ANTI-BETA2 GLYCOPROTEIN 1 IGM CONCENTRATION: <2.4 U/ML
ANTI-CARDIOLIPIN IGG CONCENTRATION: 3 GPL
ANTI-CARDIOLIPIN IGM CONCENTRATION: 4 MPL
ANTI-CENP IGG CONCENTRATION: 1 U/ML
ANTI-CYCLIC CITRULLINATED PEPTIDE IGG CONCENTRATION: 3 U/ML
ANTI-DOUBLE-STRANDED DNA IGG CONCENTRATION: 55 IU/ML
ANTI-JO-1 IGG CONCENTRATION: 1 U/ML
ANTI-NUCLEAR ANTIBODIES - CYTOPLASMIC PATTERN: NORMAL
ANTI-NUCLEAR ANTIBODIES - PRIMARY NUCLEAR PATTERN: NORMAL
ANTI-NUCLEAR ANTIBODIES - PRIMARY PATTERN TITER: ABNORMAL
ANTI-NUCLEAR ANTIBODIES IGG CONCENTRATION: 48 UNITS
ANTI-RNA POL III IGG CONCENTRATION: <0.7 U/ML
ANTI-RNP70 IGG CONCENTRATION: 3 U/ML
ANTI-RO52 IGG CONCENTRATION: 4 U/ML
ANTI-RO60 IGG CONCENTRATION: 1 U/ML
ANTI-SCL-70 IGG CONCENTRATION: 1 U/ML
ANTI-SMITH IGG CONCENTRATION: 1 U/ML
ANTI-SS-B (LA) IGG CONCENTRATION: 1 U/ML
ANTI-THYROGLOBULIN IGG CONCENTRATION: 36 IU/ML
ANTI-THYROID PEROXIDASE IGG CONCENTRATION: 8 IU/ML
ANTI-U1RNP IGG CONCENTRATION: 5 U/ML
AVISE LUPUS INDEX: 0
AVISE LUPUS RESULT: NORMAL
B-LYMPHOCYTE-BOUND C4D (BC4D) LEVEL: 20
ERYTHROCYTE-BOUND C4D (EC4D) LEVEL: 15
RHEUMATOID FACTOR (IGA) CONCENTRATION: 8 IU/ML
RHEUMATOID FACTOR (IGM) CONCENTRATION: 2 IU/ML

## 2023-05-12 ENCOUNTER — APPOINTMENT (OUTPATIENT)
Dept: UROLOGY | Facility: CLINIC | Age: 68
End: 2023-05-12

## 2023-05-16 ENCOUNTER — APPOINTMENT (OUTPATIENT)
Dept: INFUSION THERAPY | Facility: CLINIC | Age: 68
End: 2023-05-16

## 2023-05-17 ENCOUNTER — APPOINTMENT (OUTPATIENT)
Dept: INFUSION THERAPY | Facility: CLINIC | Age: 68
End: 2023-05-17

## 2023-05-18 ENCOUNTER — APPOINTMENT (OUTPATIENT)
Dept: UROLOGY | Facility: CLINIC | Age: 68
End: 2023-05-18

## 2023-06-12 NOTE — ED PROVIDER NOTE - CARE PLAN
Device transmission reviewed. Device demonstrated appropriate function.       Electronically Signed by: Ke Chung M.D.    6/16/2023  2:11 PM     1 Principal Discharge DX:	Adult failure to thrive   Principal Discharge DX:	Adult failure to thrive  Secondary Diagnosis:	OLGA (acute kidney injury)

## 2024-02-19 NOTE — DISCHARGE NOTE PROVIDER - CARE PROVIDER_API CALL
DOS: 2024  NAME: Flako Coreas   : 1966  PCP: Akash Rodriguez Jr.,     Chief Complaint   Patient presents with    Dizziness     F/u      Referring MD: Ibis Luther APRN    Neurological Problem:  57 y.o. right-handed male with a Hx of GERD, diverticulitis, ADHD who presents today for a hospital follow-up for stroke and dizziness.  Problem is new to me.  He is not accompanied by any family.  History is provided by the patient and review of records as summarized below.    Interval History:  Mr. Coreas initially presented to [facility] in 2023 with complaints of acute onset dizziness, gait imbalance, and falling towards the right.  He was found to be significantly hypertensive at the time with /141.  He had an MRI brain without that revealed several new left cerebellar ischemic infarcts within the left PICA territory and chronic right thalamic lacunar disease.  His CTA head and neck at that time revealed a dominant right vertebral and hypoplastic left vertebral with a possible occlusion versus severe stenosis.  His 2D echo revealed a mildly dilated LA, negative saline test, EF of 66 to 70%, no evidence of LV thrombus, all LV wall segments contracted normally, no significant AV stenosis or MVR. He was treated with DAPT with ASA and Plavix with plans to continue for 21 days and then transition to a single antiplatelet therapy along with a high-dose statin.  He then represented back a few days after discharge again complaining of dizziness.  Repeat MRI brain without revealed new left cerebellar infarcts.  A MEGHANN was performed which revealed a mildly dilated LA cavity, no evidence of PFO, saline test were negative, no LA appendage thrombus, no LV thrombus, EF was >70%, very mild plaque in the distal descending thoracic aorta but no plaques in the aortic arch or ascending aorta.  Zio patch was recommended at discharge and this did not reveal any evidence of atrial  fibrillation or atrial flutter.  Antiphospholipid syndrome antibodies were sent and were negative.  He had a P2Y12 level checked that was therapeutic at 171.  He was recommended to continue DAPT for 21 days and then transition to aspirin monotherapy.  He was referred to sleep medicine for sleep study evaluation.He was recommended to decreasing alcohol intake as he had reported drinking 3-4 beers daily.  He remains severely ataxic.  Rehab was recommended.    He then presented back to the hospital on 9/2 with complaints of dizziness and ataxia with associated photophobia and headache.  He had a repeat MRI brain that did not show any new infarcts.  Repeat vessel imaging again showed possible left vert occlusion versus severe stenosis with a dominant right vert and good flow to the basilar. Symptoms were felt to be exacerbation of his recent cerebellar stroke versus symptomatic left vertebral occlusion/critical stenosis versus vestibular migraine. He was recommended to continue DAPT and statin therapy as well as regularly monitoring his BP.  He was initiated on supplements with magnesium and riboflavin for suspected component of vestibular migraine as well.  Of note, he was noted to have severe anxiety at the time and was on Viibryd.  Previously and in August we recommended adding Lexapro due to concern for severe anxiety at that time as well.    He presented back to the hospital On 10/23 with complaints of sudden onset of left facial and perioral numbness and tingling and left hand tingling.  He reported baseline diplopia since initial stroke in August although there was no mention of this in our notes at the time.  He was on aspirin monotherapy.  His CT perfusion showed a larger deficit then prior perfusion in August but unchanged CTA with a critical stenosis versus occlusion at the vertebral.  He was offered TNK however he declined.  He was loaded with Plavix.  MRI brain without revealed no evidence of a new infarct,  left cerebellar infarcts again noted as well as chronic right thalamic infarct.  He had a P2Y12 drawn which revealed a level of 212 therefore he was initiated on Brilinta and aspirin was restarted.  He was instructed to continue DAPT for 90 days and then Brilinta monotherapy.  He then represented back to the hospital 4 hours after discharge on 10/27 with complaints of suffering a fall.  He was diagnosed with a head injury/concussion secondary to vertigo from his left cerebellar infarct.  He was recommended to undergo rehab.    Most recently he presented to the hospital on 2/13 with complaints of suffering a mechanical fall and developing a headache and back discomfort afterwards.  He had a MRI brain without that did not reveal any new evidence of restricted diffusion.  His CTA head and neck revealed known left vertebral critical stenosis versus occlusion.  He was on ASA 81 mg at the time.  He was seen by Dr. Murphy with our service who noted that he still believe vascular risk factor control medical management was the best option considering his MRI did not reveal any new strokes.  Given that the patient was complaining of some rectal bleeding he recommended holding off on adding any additional antiplatelet therapy and continuing aspirin and Lipitor.  He documented 325 mg however the patient states he was taking 81 mg.  He recommended considering a Repatha trial due to no significant reduction in his LDL level.  Lipoprotein A level was drawn and was elevated at 209.9.  He recommended if the patient developed new posterior circulation strokes in the future that he should be evaluated by endovascular surgery for possible candidate for stenting.  He recommended SBP goal of 120-150 and avoiding dehydration.  Sleep study evaluation was again recommended.  Neuropsychiatry evaluation during inpatient stay was also recommended, patient opted to see outpatient neuropsychiatry.    He presents today and reports he continues  "with dizziness and off-balance feelings especially when he is very tired.  Also has diplopia.  He continues on ASA 81 mg.  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  Biopsies were sent and pathology results are pending.  GI recommended repeat colonoscopy in 5 years, repeat EGD in 3 years, and use of Protonix was recommended.  BPs have been normalized.  Today was 120/76.  Feels his memory is poor.  Still having trouble with sleep.    Review of Systems:        Review of Systems   Constitutional:  Positive for fatigue. Negative for unexpected weight change.   HENT:  Positive for tinnitus. Negative for ear pain, hearing loss and nosebleeds.    Eyes:  Positive for visual disturbance (double vision). Negative for photophobia and pain.   Respiratory:  Negative for chest tightness, shortness of breath and wheezing.    Cardiovascular:  Negative for chest pain, palpitations and leg swelling.   Musculoskeletal:  Positive for gait problem (balance problem > 2 falls).   Allergic/Immunologic: Negative for food allergies.   Neurological:  Positive for dizziness, syncope and weakness (general). Negative for numbness and headaches.   Psychiatric/Behavioral:  Positive for confusion, decreased concentration and sleep disturbance. The patient is not nervous/anxious.        \"The following portions of the patient's history were reviewed and updated as appropriate: allergies, current medications, past family history, past medical history, past social history, past surgical history and problem list.\"    Review and Interpretation of Imaging as described in interval history.    Laboratory Results:             Lab Results   Component Value Date    HGBA1C 5.20 " "02/12/2024     Lab Results   Component Value Date    CHOL 167 02/12/2024    CHOL 127 10/24/2023    CHOL 117 08/20/2023     Lab Results   Component Value Date    HDL 34 (L) 02/12/2024    HDL 39 (L) 10/24/2023    HDL 34 (L) 08/20/2023     Lab Results   Component Value Date    LDL 96 02/12/2024    LDL 71 10/24/2023    LDL 62 08/20/2023     Lab Results   Component Value Date    TRIG 217 (H) 02/12/2024    TRIG 88 10/24/2023    TRIG 115 08/20/2023     No components found for: \"CHOLHDL\"  Lab Results   Component Value Date    RPR Non Reactive 02/19/2021     Lab Results   Component Value Date    TSH 0.364 08/20/2023     Lab Results   Component Value Date    IWQOWXMA18 370 02/13/2024     Lab Results   Component Value Date    GLUCOSE 105 (H) 02/14/2024    BUN 14 02/14/2024    CREATININE 0.92 02/14/2024    EGFRIFNONA 98 11/05/2018    EGFRIFAFRI >60 08/27/2022    BCR 15.2 02/14/2024    K 3.7 02/14/2024    CO2 24.0 02/14/2024    CALCIUM 9.0 02/14/2024    PROTENTOTREF 7.0 05/11/2022    ALBUMIN 4.4 02/14/2024    LABIL2 1.6 08/27/2022    AST 15 02/14/2024    ALT 21 02/14/2024     Lab Results   Component Value Date    WBC 7.37 02/14/2024    HGB 12.5 (L) 02/14/2024    HCT 38.8 02/14/2024    MCV 82.2 02/14/2024     02/14/2024     Lab Results   Component Value Date    INR 0.99 02/14/2024    INR 1.00 02/12/2024    INR 0.97 12/10/2023    PROTIME 13.2 02/14/2024    PROTIME 13.3 02/12/2024    PROTIME 13.0 12/10/2023       Physical Examination:   mRS:   General Appearance:   Anxious appearing elderly male, well developed, well nourished, well groomed, alert, and cooperative.  HEENT: Normocephalic. Atraumatic.   Extremities:    No obvious edema.  Respiratory:   Even and unlabored.    Neurological examination:  Higher Integrative  Function: Oriented to year, place, president, and person. Normal registration, recalled 3/3 presidents. Normal language including comprehension, spontaneous speech, reading, writing, naming, and vocabulary. " Normal fund of knowledge and higher integrative function.  CN II: Normal visual fields.    CN III IV VI: Extraocular movements are full without nystagmus.   CN V: Normal facial sensation and strength of muscles of mastication.  CN VII: Facial movements are symmetric. No weakness.  CN XI: Sternocleidomastoid and trapezius are normal.  No weakness.  CN XII:   The tongue is midline.  No atrophy or fasciculations.  Motor: Normal muscle strength, bulk and tone in upper and lower extremities.  No fasciculations, rigidity, spasticity, or abnormal movements.  Sensation: Normal to light touch, vibration, temperature, in arms, normal vibratory sensation in legs. Normal graphesthesia   Station and Gait: Moderate to severe ataxia especially with initial standing.  Coordination: Finger to nose test shows no dysmetria.  Rapid alternating movements are normal.        Diagnoses and all orders for this visit:    1. History of CVA with residual deficit (Primary)    2. Vertebral artery stenosis, left    3. 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 Dizziness    5. Hospital discharge follow-up    6. Hypertension, unspecified type    7. Dyslipidemia, goal LDL below 70    8. Anxiety about health    9. Memory loss    10. History of alcohol dependence    11. Obstructive sleep apnea    12. At risk for obstructive sleep apnea  -     Ambulatory Referral to Sleep Medicine    Other orders  -     Discontinue: aspirin (Simon Aspirin) 325 MG tablet; Take 1 tablet by mouth Daily.  Dispense: 100 tablet; Refill: 0  -     aspirin 81 MG EC tablet; Take 1 tablet by mouth Daily.  Dispense: 90 tablet; Refill: 1  -     rosuvastatin (Crestor) 20 MG tablet; Take 2 tablets by mouth Every Night.  Dispense: 30 tablet; Refill: 11  -     vitamin B-12 (CYANOCOBALAMIN) 500 MCG tablet; Take 1 tablet by mouth Daily.  Dispense: 90 tablet; Refill: 0      Plan:     -Continue ASA 81mg, given no new areas of restricted diffusion on recent MRI.  Path results for  Lepe's disease are pending.  -Need to monitor PPI use with antiplatelets, okay to continue Protonix but recommend avoiding Nexium  -Lipoprotein A level was significantly elevated, recent LDL was 96. Needs to discuss with PCP about Repatha trial. Could try and switch to Crestor 40mg in the meantime.  -He needs sleep study evaluation, will place sleep medicine referral  -Recommend neuropsychiatry referral, discussed with the patient.  We will hold off until his follow-up with Dr. Leon. Remains on Viibryd.  -Will start him on B12 replacement with 500mcg daily given low normal B12. Needs level rechecked in 3 months  -Monitor BP regularly, discussed SBP goal 120-140.  -Keep well-hydrated and avoid hypotension  -Agree with vestibular therapy placed by PCP, discussed with patient changing positions slowly.  -Encourage regular physical activity as tolerated  -Falls precautions discussed extensively  Maintain well-balanced diet, recommend the Mediterranean diet if not on diabetic diet   Goal LDL <70    Serum glucose < 140, A1C 7.0 or <   Call 911 for stroke any stroke symptoms   Follow-up with Dr. Leon in 3 months    MDM  Reviewed: previous chart and vitals  Reviewed previous: labs, MRI and CT scan  Interpretation: labs, MRI and CT scan      I spent 50 minutes caring for patient on todays date of service. This time includes time spent by me in the following activities: obtaining and reviewing a separately obtained history, performing a medically appropriate examination and evaluation, counseling and educating the patient on diagnosis and treatment, ordering medications, referring and communicating with other health care professionals and documenting information in the medical record.        JANEEN Brooks   Lynda Du)  Urology  [address] [address]  Phone: [phone]  Fax: [phone]  Scheduled Appointment: 05/18/2023 09:30 AM    Mark Quiñones)  Surgery  [address] [address]  Phone: [phone]  Fax: [phone]  Follow Up Time:     Loren Hodges)  Rheumatology  [address] [place] [address], [address] [place] [address]  Phone: [phone]  Fax: [phone]  Follow Up Time: 1 month    Marbin Rae)  Internal Medicine  [address], [address] [place] [address]  Phone: [phone]  Fax: [phone]  Follow Up Time:    Lynda Du)  Urology  [address] [place] [address]  Phone: [phone]  Fax: [phone]  Scheduled Appointment: 05/18/2023 09:30 AM    Mark Quiñones)  Surgery  [address] [address]  Phone: [phone]  Fax: [phone]  Follow Up Time:     Loren Hodges)  Rheumatology  [address] [place] [address], [address] [place] [address]  Phone: [phone]  Fax: [phone]  Follow Up Time: 1 month    Marbin Rae)  Internal Medicine  [address], [address] [place] [address]  Phone: [phone]  Fax: [phone]  Follow Up Time:     Kalyn Miles)  Hematology; Internal Medicine; Medical Oncology  [address] [place] [address]  Phone: [phone]  Established Patient  Follow Up Time: 2 weeks   Lynda Du)  Urology  [address] [place] [address]  Phone: [phone]  Fax: [phone]  Scheduled Appointment: 05/18/2023 09:30 AM    Mark Quiñones)  Surgery  [address] [address]  Phone: [phone]  Fax: [phone]  Follow Up Time:     Loren Hodges)  Rheumatology  [address] [place] [address], [address] [place] [address]  Phone: [phone]  Fax: [phone]  Scheduled Appointment: 04/26/2023 12:00 PM    Marbin Rae)  Internal Medicine  [address], [address] [place] [address]  Phone: [phone]  Fax: [phone]  Follow Up Time:     Kalyn Miles)  Hematology; Internal Medicine; Medical Oncology  [address] [place] [address]  Phone: [phone]  Established Patient  Scheduled Appointment: 05/10/2023 03:00 PM   Lynda Du)  Urology  [address] [place] [address]  Phone: [phone]  Fax: [phone]  Scheduled Appointment: 05/18/2023 09:30 AM    Mark Quiñones)  Surgery  [address] [address]  Phone: [phone]  Fax: [phone]  Follow Up Time:     Loren Hodges)  Rheumatology  [address] [place] [address], [address] [place] [address]  Phone: [phone]  Fax: [phone]  Scheduled Appointment: 04/26/2023 12:00 PM    Marbin Rae)  Internal Medicine  [address], [address] [place] [address]  Phone: [phone]  Fax: [phone]  Follow Up Time:     Kalyn Miles)  Hematology; Internal Medicine; Medical Oncology  [address] [place] [address]  Phone: [phone]  Scheduled Appointment: 05/10/2023 03:00 PM   Lynda Du)  Urology  [address] [place] [address]  Phone: [phone]  Fax: [phone]  Scheduled Appointment: 05/18/2023 09:30 AM    Mark Quiñones)  Surgery  [address] [address]  Phone: [phone]  Fax: [phone]  Follow Up Time:     Loren Hodges)  Rheumatology  [address] [place] [address], [address] [place] [address]  Phone: [phone]  Fax: [phone]  Scheduled Appointment: 04/26/2023 12:00 PM    Marbin Rae)  Internal Medicine  [address], [address] [place] [address]  Phone: [phone]  Fax: [phone]  Follow Up Time:     Kalyn Miles)  Hematology; Internal Medicine; Medical Oncology  [address] [place] [address]  Phone: [phone]  Scheduled Appointment: 05/10/2023 03:00 PM    FOX NORIEGA  Internal Medicine  Phone: [phone]  Fax: [phone]  Established Patient  Scheduled Appointment: 04/24/2023 12:00 PM   Lynda Du)  Urology  [address] [place] [address]  Phone: [phone]  Fax: [phone]  Scheduled Appointment: 05/18/2023 09:30 AM    Mark Quiñones)  Surgery  [address] [place] [address]  Phone: [phone]  Fax: [phone]  Follow Up Time:     Loren Hodges (MD)  Rheumatology  [address] [place] [address], [address] [place] [address]  Phone: [phone]  Fax: [phone]  Scheduled Appointment: 04/26/2023 12:00 PM    Marbin Rae)  Internal Medicine  [address], [address] [place] [address]  Phone: [phone]  Fax: [phone]  Follow Up Time:     Kayln Miles)  Hematology; Internal Medicine; Medical Oncology  [address] [place] [address]  Phone: [phone]  Scheduled Appointment: 05/10/2023 03:00 PM    FOX NORIEGA  Internal Medicine  Phone: [phone]  Fax: [phone]  Established Patient  Scheduled Appointment: 04/24/2023 12:00 PM    Dixon Reyes)  Clinical Neurophysiology; Neurology  [address] [place] [address]  Phone: [phone]  Fax: [phone]  Scheduled Appointment: 04/21/2023 03:00 PM

## 2024-05-15 NOTE — H&P ADULT - NSHPSOCIALHISTORY_GEN_ALL_CORE
Dear Marilu Orlando,  Thank you for visiting the [facility] today for your visit, I appreciate your time. I hope that we have addressed any concerns you may have had.  Sincerely,  Phong Lynn M.D., F.A.C.C.       Below are the tests/labs ordered along with any new medications / refills:  Orders Placed This Encounter    Comprehensive Metabolic Panel    CBC with Automated Differential    Lipid Panel With Reflex    2D Echo With Doppler & Color Flow       We always advise our patients to follow a well balanced diet, specifically a mediterranean style diet focusing on fruits, vegetables, extra virgin olive oil and fish. We suggest avoiding foods high in trans saturated fats, red meats, processed foods or those high in sugar content.  We also recommend over 30 minutes of aerobic exercise 5 days a week (total of 150 minutes) if you can tolerate this.  Healthy eating habits and exercise are critical to us as we work towards achieving your best health.  Follow up as scheduled and please call with any questions or concerns that may arise.      No no alcohol or tobacco use  works as   lives at home with wife

## 2024-08-30 NOTE — DIETITIAN INITIAL EVALUATION ADULT - PERTINENT MEDS FT
stated MEDICATIONS  (STANDING):  cefTRIAXone   IVPB 2000 milliGRAM(s) IV Intermittent every 24 hours  enoxaparin Injectable 40 milliGRAM(s) SubCutaneous every 24 hours  levothyroxine 50 MICROGram(s) Oral daily  Nephro-james 1 Tablet(s) Oral daily  nystatin    Suspension 008931 Unit(s) Oral every 24 hours  pantoprazole    Tablet 40 milliGRAM(s) Oral two times a day  thiamine IVPB 500 milliGRAM(s) IV Intermittent every 8 hours    MEDICATIONS  (PRN):

## 2024-10-17 NOTE — ED PROVIDER NOTE - NS ED ATTENDING STATEMENT MOD
Patient's most recent potassium results are listed below.   Recent Labs     10/15/24  0616 10/16/24  0454 10/17/24  0515   K 3.0* 3.4* 3.5       Plan  - Replete potassium per protocol  - Monitor potassium Daily     Attending Only

## 2025-01-12 NOTE — PACU DISCHARGE NOTE - COMMENTS
English
Endorses bilateral shoulder pain, which he affirms was present preoperatively and is unchanged in nature and severity.     Denies chest pain, shortness of breath, palpitations, nausea/vomiting, lightheadedness, loss of consciousness.

## 2025-02-28 NOTE — H&P ADULT - ATTENDING COMMENTS
What Type Of Note Output Would You Prefer (Optional)?: Bullet Format What Is The Reason For Today's Visit?: Full Body Skin Examination What Is The Reason For Today's Visit? (Being Monitored For X): concerning skin lesions on a periodic basis #Weakness: pw generalized weakness UE>LE, chronic dysphagia, chronic neck pain, decreased PO intake and 50lb weight loss. Pan CT scan w/ c/f testicular malignancy, ?mets to brain on CT neck however not seen on CTH non con. +chronic neck pain/stiffness, but denies headache/vision changes. Denies bowel/bladder incontince- wears adult diapers due to weakness and inability to get to bathroom. Given chronic neck pain and primarily UE weakness- f/up stat mri head/spine and NSG Eval. f/up b12/folate, rpr, tsh, HIV, dysphagia/malignancy management below. Fall/aspiration precautions     #Brain mass: ?ring enhancing brain mass on CT neck imaging, not seen on dedicated CTH non con. Fup MRI brain. Pt is afebrile, hx of chronic neck stiffness but Denies headache/vission changes. Neg kernig/brudzinski. +mild leukocytosis likely 2/2 aspiration. In the setting of testicular mass, ?brain mets. Less likely infectious etiology. F/up hiv, blood cx, MRI brain, NSG recs    #r/o malignancy: CT imaging c/f testicular mass, CT neck ?parietal lobe mass. F/up heme/onc, NSG.  consult.      #Dysphagia: unclear etiology. Denies pain w/ swallowing, OP exam +thrush. No FND on exam. ?brain mass on CT imaging, unclear if contributing. F/up MRI brain, SS eval, GI consult. NPO, aspiration precautions.

## (undated) DEVICE — FORCEP RADIAL JAW 4 W NDL 2.2MM 2.8MM 240CM ORANGE DISP